# Patient Record
Sex: FEMALE | Race: BLACK OR AFRICAN AMERICAN | Employment: UNEMPLOYED | URBAN - METROPOLITAN AREA
[De-identification: names, ages, dates, MRNs, and addresses within clinical notes are randomized per-mention and may not be internally consistent; named-entity substitution may affect disease eponyms.]

---

## 2018-06-26 ENCOUNTER — OFFICE VISIT (OUTPATIENT)
Dept: FAMILY MEDICINE CLINIC | Age: 23
End: 2018-06-26

## 2018-06-26 VITALS
HEART RATE: 82 BPM | BODY MASS INDEX: 47.12 KG/M2 | RESPIRATION RATE: 16 BRPM | WEIGHT: 240 LBS | OXYGEN SATURATION: 99 % | TEMPERATURE: 99 F | DIASTOLIC BLOOD PRESSURE: 80 MMHG | SYSTOLIC BLOOD PRESSURE: 110 MMHG | HEIGHT: 60 IN

## 2018-06-26 DIAGNOSIS — R25.1 TREMULOUSNESS: ICD-10-CM

## 2018-06-26 DIAGNOSIS — J45.20 MILD INTERMITTENT ASTHMA WITHOUT COMPLICATION: Primary | ICD-10-CM

## 2018-06-26 DIAGNOSIS — G47.419 PRIMARY NARCOLEPSY WITHOUT CATAPLEXY: ICD-10-CM

## 2018-06-26 PROBLEM — E66.01 OBESITY, MORBID (HCC): Status: ACTIVE | Noted: 2018-06-26

## 2018-06-26 RX ORDER — FLUTICASONE PROPIONATE 50 MCG
2 SPRAY, SUSPENSION (ML) NASAL DAILY
COMMUNITY
End: 2020-06-09

## 2018-06-26 RX ORDER — EPINEPHRINE 0.3 MG/.3ML
0.3 INJECTION SUBCUTANEOUS
COMMUNITY
End: 2021-03-08

## 2018-06-26 RX ORDER — LEVALBUTEROL TARTRATE 45 UG/1
2 AEROSOL, METERED ORAL
COMMUNITY
End: 2020-06-09

## 2018-06-26 RX ORDER — MONTELUKAST SODIUM 10 MG/1
10 TABLET ORAL DAILY
COMMUNITY
End: 2018-08-06 | Stop reason: SDUPTHER

## 2018-06-26 NOTE — PATIENT INSTRUCTIONS
Narcolepsy: Care Instructions  Your Care Instructions  Everybody gets a little sleepy once in a while, during a long car ride or other times when you want to be alert. But some people cannot control their sleepiness. It is no fun to be in the middle of your workday or driving your car down the street and have an overwhelming desire to sleep. This condition is called narcolepsy. Doctors do not know what causes narcolepsy. Your doctor may ask you to keep a sleep diary for a couple of weeks. It will help you and your doctor decide on treatment. It often helps to take limited naps during the day. And these things might help you sleep better at night: create a good place to sleep, do things that help your mood before you go to bed, and keep a consistent sleep schedule. Your doctor may recommend medicine to help you stay awake during the day or sleep at night. Follow-up care is a key part of your treatment and safety. Be sure to make and go to all appointments, and call your doctor if you are having problems. It's also a good idea to know your test results and keep a list of the medicines you take. How can you care for yourself at home? · Try to take 2 or 3 short naps at regular times during the day. After a nap, always give yourself time to become alert before you drive a car or do anything that might cause an accident. · Take your medicines exactly as prescribed. Call your doctor if you think you are having a problem with your medicine. You may need to try several medicines before you find the one that works best for you. · Try to improve your nighttime sleep habits. Here are a few of the things you could do:  ¨ Go to bed only when you are sleepy, and get up at the same time every day, even if you do not feel rested. This might help you sleep well the next night and the night after that.   ¨ If you lie awake for longer than 15 minutes, get up, leave the bedroom, and do something quiet, such as read, until you feel sleepy again. ¨ Avoid drinking or eating anything with caffeine after 3 p.m. This includes coffee, tea, cola drinks, and chocolate. ¨ Make sure your bedroom is not too hot or too cold, and keep it quiet and dark. ¨ Make sure your mattress provides good support. · Be kind to your body:  ¨ Relieve tension with exercise or a massage. ¨ Learn and do relaxation techniques. ¨ Avoid alcohol, caffeine, nicotine, and illegal drugs. They can increase your anxiety level and cause sleep problems. · Get light exercise daily. Gentle stretching, light aerobics, swimming, walking, and riding a bicycle can help to keep you going during the day and to sleep well at night. · Eat a healthy diet. You may feel better if you avoid heavy meals and eat more fruits and vegetables. · Do not use over-the-counter sleeping pills. They can make your sleep restless. · Ask your doctor if any medicines you take could cause sleepiness. For example, cold and allergy medicines can make you drowsy. · Consider joining a support group with people who have narcolepsy or other sleep problems. These groups can be a good source of tips for what to do. Also, it can be comforting to talk to people who face similar challenges. Your doctor can tell you how to contact a support group. When should you call for help? Call your doctor now or seek immediate medical care if:  ? · You passed out (lost consciousness). ? · You cannot use your muscles. This may happen very briefly, sometimes after you laugh or are angry, and may only affect part of your body. ? Watch closely for changes in your health, and be sure to contact your doctor if:  ? · Your sleepiness continues to get worse. Where can you learn more? Go to http://chiquis-tran.info/. Enter G762 in the search box to learn more about \"Narcolepsy: Care Instructions. \"  Current as of: May 12, 2017  Content Version: 11.4  © 4989-7365 Healthwise, [facility].  Care instructions adapted under license by the [employer] (which disclaims liability or warranty for this information). If you have questions about a medical condition or this instruction, always ask your healthcare professional. [address] any warranty or liability for your use of this information.

## 2018-06-26 NOTE — MR AVS SNAPSHOT
[address] [facility] 
 
 
 [address] [id number] [address] [facility] [address] 
[phone] Patient: Laura Mccollum MRN: [id number] :1995 Visit Information Date & Time Provider Department Dept. Phone Encounter #  
 2018 11:20 AM Ángel sIaac [address] [place] [address] [id number] Follow-up Instructions Return in about 4 months (around 10/26/2018) for well exam.  
  
Allergies as of 2018  Review Complete On: 2018 By: Ángel Isaac MD  
  
 Severity Noted Reaction Type Reactions Shellfish Derived  2018    Shortness of Breath, Swelling Soy  2018    Shortness of Breath, Swelling Tomato  2018    Swelling Current Immunizations  Never Reviewed No immunizations on file. Not reviewed this visit You Were Diagnosed With   
  
 Codes Comments Mild intermittent asthma without complication    -  Primary ICD-10-CM: J45.20 ICD-9-CM: 493.90 Primary narcolepsy without cataplexy     ICD-10-CM: G47.419 ICD-9-CM: 347.00 Tremulousness     ICD-10-CM: R25.1 ICD-9-CM: 636. 0 Vitals BP Pulse Temp Resp Height(growth percentile) Weight(growth percentile) 110/80 82 99 °F (37.2 °C) (Oral) 16 5' (1.524 m) 240 lb (108.9 kg) SpO2 BMI OB Status Smoking Status 99% 46.87 kg/m2 Chemically Induced Never Smoker Vitals History BMI and BSA Data Body Mass Index Body Surface Area  
 46.87 kg/m 2 2.15 m 2 Preferred Pharmacy Pharmacy Name Phone [facility], [address] [place] [address] [phone] Your Updated Medication List  
  
   
This list is accurate as of 18 12:49 PM.  Always use your most recent med list.  
  
  
  
  
 CHILDREN'S FLONASE ALLERGY RLF 50 mcg/actuation nasal spray Generic drug:  fluticasone 2 Sprays by Both Nostrils route daily. DULERA 100-5 mcg/actuation HFA inhaler Generic drug:  mometasone-formoterol Take 2 Puffs by inhalation two (2) times a day. EPINEPHrine 0.3 mg/0.3 mL injection Commonly known as:  EPIPEN  
0.3 mg by IntraMUSCular route once as needed. montelukast 10 mg tablet Commonly known as:  SINGULAIR Take 10 mg by mouth daily. NEXPLANON SDRM  
by SubDERmal route. XOLAIR SC  
by SubCUTAneous route. XOPENEX HFA 45 mcg/actuation inhaler Generic drug:  levalbuterol tartrate Take 2 Puffs by inhalation every four (4) hours as needed for Wheezing. We Performed the Following REFERRAL TO PULMONARY DISEASE [YFX87 Custom] Follow-up Instructions Return in about 4 months (around 10/26/2018) for well exam. To-Do List   
 06/26/2018 Lab:  HEMOGLOBIN A1C WITH EAG   
  
 07/03/2018 Lab:  METABOLIC PANEL, BASIC Referral Information Referral ID Referred By Referred To  
  
 [id number] Elias Ritchiejesse, DO   
   [address] [place] [address], [address] [address] Phone: [phone] Fax: [phone] Visits Status Start Date End Date 1 New Request 6/26/18 6/26/19 If your referral has a status of pending review or denied, additional information will be sent to support the outcome of this decision. Patient Instructions Narcolepsy: Care Instructions Your Care Instructions Everybody gets a little sleepy once in a while, during a long car ride or other times when you want to be alert. But some people cannot control their sleepiness. It is no fun to be in the middle of your workday or driving your car down the street and have an overwhelming desire to sleep. This condition is called narcolepsy. Doctors do not know what causes narcolepsy. Your doctor may ask you to keep a sleep diary for a couple of weeks. It will help you and your doctor decide on treatment. It often helps to take limited naps during the day.  And these things might help you sleep better at night: create a good place to sleep, do things that help your mood before you go to bed, and keep a consistent sleep schedule. Your doctor may recommend medicine to help you stay awake during the day or sleep at night. Follow-up care is a key part of your treatment and safety. Be sure to make and go to all appointments, and call your doctor if you are having problems. It's also a good idea to know your test results and keep a list of the medicines you take. How can you care for yourself at home? · Try to take 2 or 3 short naps at regular times during the day. After a nap, always give yourself time to become alert before you drive a car or do anything that might cause an accident. · Take your medicines exactly as prescribed. Call your doctor if you think you are having a problem with your medicine. You may need to try several medicines before you find the one that works best for you. · Try to improve your nighttime sleep habits. Here are a few of the things you could do: ¨ Go to bed only when you are sleepy, and get up at the same time every day, even if you do not feel rested. This might help you sleep well the next night and the night after that. ¨ If you lie awake for longer than 15 minutes, get up, leave the bedroom, and do something quiet, such as read, until you feel sleepy again. ¨ Avoid drinking or eating anything with caffeine after 3 p.m. This includes coffee, tea, cola drinks, and chocolate. ¨ Make sure your bedroom is not too hot or too cold, and keep it quiet and dark. ¨ Make sure your mattress provides good support. · Be kind to your body: ¨ Relieve tension with exercise or a massage. ¨ Learn and do relaxation techniques. ¨ Avoid alcohol, caffeine, nicotine, and illegal drugs. They can increase your anxiety level and cause sleep problems. · Get light exercise daily.  Gentle stretching, light aerobics, swimming, walking, and riding a bicycle can help to keep you going during the day and to sleep well at night. · Eat a healthy diet. You may feel better if you avoid heavy meals and eat more fruits and vegetables. · Do not use over-the-counter sleeping pills. They can make your sleep restless. · Ask your doctor if any medicines you take could cause sleepiness. For example, cold and allergy medicines can make you drowsy. · Consider joining a support group with people who have narcolepsy or other sleep problems. These groups can be a good source of tips for what to do. Also, it can be comforting to talk to people who face similar challenges. Your doctor can tell you how to contact a support group. When should you call for help? Call your doctor now or seek immediate medical care if: 
? · You passed out (lost consciousness). ? · You cannot use your muscles. This may happen very briefly, sometimes after you laugh or are angry, and may only affect part of your body. ? Watch closely for changes in your health, and be sure to contact your doctor if: 
? · Your sleepiness continues to get worse. Where can you learn more? Go to http://chiquis-tran.info/. Enter B515 in the search box to learn more about \"Narcolepsy: Care Instructions. \" Current as of: May 12, 2017 Content Version: 11.4 © 8826-4681 Healthwise, Incorporated. Care instructions adapted under license by [employer] (which disclaims liability or warranty for this information). If you have questions about a medical condition or this instruction, always ask your healthcare professional. [address] [address] any warranty or liability for your use of this information. Introducing [address]! [facility] [employer] introduces [employer] patient portal. Now you can access parts of your medical record, email your doctor's office, and request medication refills online.    
 
1. In your internet browser, go to [email or web address] 2. Click on the First Time User? Click Here link in the Sign In box. You will see the New Member Sign Up page. 3. Enter your shopkick Access Code exactly as it appears below. You will not need to use this code after youve completed the sign-up process. If you do not sign up before the expiration date, you must request a new code. · shopkick Access Code: [email or web address] Expires: 9/23/2018 12:19 PM 
 
4. Enter the last four digits of your Social Security Number (xxxx) and Date of Birth (mm/dd/yyyy) as indicated and click Submit. You will be taken to the next sign-up page. 5. Create a Rewardpodt ID. This will be your shopkick login ID and cannot be changed, so think of one that is secure and easy to remember. 6. Create a shopkick password. You can change your password at any time. 7. Enter your Password Reset Question and Answer. This can be used at a later time if you forget your password. 8. Enter your e-mail address. You will receive e-mail notification when new information is available in [address] 9. Click Sign Up. You can now view and download portions of your medical record. 10. Click the Download Summary menu link to download a portable copy of your medical information. If you have questions, please visit the Frequently Asked Questions section of the shopkick website. Remember, shopkick is NOT to be used for urgent needs. For medical emergencies, dial 911. Now available from your iPhone and Android! Please provide this summary of care documentation to your next provider. If you have any questions after today's visit, please call [phone].

## 2018-06-26 NOTE — PROGRESS NOTES
HISTORY OF PRESENT ILLNESS  Compa Faith is a 25 y.o. female. HPI   She is new to the practice  Patient is here today for evaluation and treatment of:  Asthma / Tremors    Asthma: pt sees a pulmonologist and needs a referral. Pt does not drive; she has narcolepsy. She states that she is on xolair shots twice a week. No wheeze today. Tremulousness: pt states that for a while she has had tremulousness; She thinks that this was due to her blood sugar was low. Occurs randomly. This concerns her. Current Outpatient Prescriptions:     mometasone-formoterol (DULERA) 100-5 mcg/actuation HFA inhaler, Take 2 Puffs by inhalation two (2) times a day., Disp: , Rfl:     etonogestrel (Gayl Noé), by SubDERmal route., Disp: , Rfl:     montelukast (SINGULAIR) 10 mg tablet, Take 10 mg by mouth daily. , Disp: , Rfl:     levalbuterol tartrate (XOPENEX HFA) 45 mcg/actuation inhaler, Take 2 Puffs by inhalation every four (4) hours as needed for Wheezing., Disp: , Rfl:     fluticasone (CHILDREN'S FLONASE ALLERGY RLF) 50 mcg/actuation nasal spray, 2 Sprays by Both Nostrils route daily. , Disp: , Rfl:     omalizumab (Madison Parkinson SC), by SubCUTAneous route., Disp: , Rfl:     EPINEPHrine (EPIPEN) 0.3 mg/0.3 mL injection, 0.3 mg by IntraMUSCular route once as needed. , Disp: , Rfl:     PMH,  Meds, Allergies, Family History, Social history reviewed    Review of Systems   Constitutional: Negative for chills and fever. Respiratory: Negative for shortness of breath and wheezing. Cardiovascular: Negative for chest pain, palpitations and leg swelling. Physical Exam   Constitutional: She appears well-developed and well-nourished. No distress. Cardiovascular: Normal rate and regular rhythm. Exam reveals no gallop and no friction rub. No murmur heard. Pulmonary/Chest: Breath sounds normal. No respiratory distress. She has no wheezes. She has no rales. Musculoskeletal: She exhibits no edema.    Nursing note and vitals reviewed. Visit Vitals    /80    Pulse 82    Temp 99 °F (37.2 °C) (Oral)    Resp 16    Ht 5' (1.524 m)    Wt 240 lb (108.9 kg)    SpO2 99%    BMI 46.87 kg/m2         ASSESSMENT and PLAN    ICD-10-CM ICD-9-CM    1. Mild intermittent asthma without complication B82.43 712.91 REFERRAL TO PULMONARY DISEASE   2. Primary narcolepsy without cataplexy G47.419 347.00 REFERRAL TO PULMONARY DISEASE   3. Tremulousness V46.5 557.3 METABOLIC PANEL, BASIC      HEMOGLOBIN A1C WITH EAG       As above, not controlled   treatment plan as listed below  Orders Placed This Encounter    METABOLIC PANEL, BASIC    HEMOGLOBIN A1C WITH EAG    [facility]    mometasone-formoterol (DULERA) 100-5 mcg/actuation HFA inhaler    etonogestrel (NEXPLANON SDRM)    montelukast (SINGULAIR) 10 mg tablet    levalbuterol tartrate (XOPENEX HFA) 45 mcg/actuation inhaler    fluticasone (CHILDREN'S FLONASE ALLERGY RLF) 50 mcg/actuation nasal spray    omalizumab (XOLAIR SC)    EPINEPHrine (EPIPEN) 0.3 mg/0.3 mL injection     meds reconciled  Labs as ordered  Follow-up Disposition:  Return in about 4 months (around 10/26/2018) for well exam.  An After Visit Summary was printed and given to the patient. This has been fully explained to the patient, who indicates understanding.

## 2018-07-03 DIAGNOSIS — R25.1 TREMULOUSNESS: ICD-10-CM

## 2018-07-04 LAB
ANION GAP SERPL CALC-SCNC: 18 MMOL/L
AVG GLU, 10930: 123 MG/DL (ref 91–123)
BUN SERPL-MCNC: 8 MG/DL (ref 6–22)
CALCIUM SERPL-MCNC: 8.5 MG/DL (ref 8.4–10.5)
CHLORIDE SERPL-SCNC: 103 MMOL/L (ref 98–110)
CO2 SERPL-SCNC: 22 MMOL/L (ref 20–32)
CREAT SERPL-MCNC: 0.6 MG/DL (ref 0.5–1.2)
GFRAA, 66117: >60
GFRNA, 66118: >60
GLUCOSE SERPL-MCNC: 83 MG/DL (ref 70–99)
HBA1C MFR BLD HPLC: 5.9 % (ref 4.8–5.9)
POTASSIUM SERPL-SCNC: 4.5 MMOL/L (ref 3.5–5.5)
SODIUM SERPL-SCNC: 143 MMOL/L (ref 133–145)

## 2018-08-06 ENCOUNTER — OFFICE VISIT (OUTPATIENT)
Dept: PULMONOLOGY | Age: 23
End: 2018-08-06

## 2018-08-06 ENCOUNTER — CLINICAL SUPPORT (OUTPATIENT)
Dept: PULMONOLOGY | Age: 23
End: 2018-08-06

## 2018-08-06 VITALS
HEART RATE: 70 BPM | DIASTOLIC BLOOD PRESSURE: 80 MMHG | HEIGHT: 60 IN | RESPIRATION RATE: 20 BRPM | BODY MASS INDEX: 46.13 KG/M2 | OXYGEN SATURATION: 96 % | SYSTOLIC BLOOD PRESSURE: 120 MMHG | WEIGHT: 235 LBS | TEMPERATURE: 98.9 F

## 2018-08-06 DIAGNOSIS — J45.909 UNCOMPLICATED ASTHMA, UNSPECIFIED ASTHMA SEVERITY, UNSPECIFIED WHETHER PERSISTENT: Primary | ICD-10-CM

## 2018-08-06 DIAGNOSIS — J45.20 MILD INTERMITTENT ASTHMA WITHOUT COMPLICATION: Primary | ICD-10-CM

## 2018-08-06 DIAGNOSIS — J47.9 BRONCHIECTASIS WITHOUT COMPLICATION (HCC): ICD-10-CM

## 2018-08-06 NOTE — PROGRESS NOTES
[facility] PUL[facility] SPECIALISTS  Pulmonary, Critical Care, and Sleep Medicine    Dear Tania Gayle,    Chief complaint:  Asthma    HPI:  Braden Vásquez is 25years old and comes to the office today at your request concerning an evaluation for asthma. The patient relates she had a thoracotomy 5 years ago for bronchiectasis. She relates now she takes [place] for asthma and only a few times a month requires albuterol. However, she notes shortness of breath with exertion such as walking upstairs or even with other activities which are not a strenuous times. She also notes wheezing but denies having a cough chest pain leg swelling. She denies allergy symptoms at this time although she states she has had this in the past and also denies symptoms of gastroesophageal reflux. She is also taking anti-IgE therapy for asthma  Allergies   Allergen Reactions    Bee Venom Protein (Honey Bee) Sneezing    Pollen Extracts Shortness of Breath    Shellfish Derived Swelling    Shellfish Derived Shortness of Breath and Swelling    Soy Shortness of Breath and Swelling    Tomato Swelling     Current Outpatient Prescriptions   Medication Sig    etonogestrel (NEXPLANON SDRM) by SubDERmal route.  levalbuterol tartrate (XOPENEX HFA) 45 mcg/actuation inhaler Take 2 Puffs by inhalation every four (4) hours as needed for Wheezing.  fluticasone (CHILDREN'S FLONASE ALLERGY RLF) 50 mcg/actuation nasal spray 2 Sprays by Both Nostrils route daily.  omalizumab (XOLAIR SC) by SubCUTAneous route.  EPINEPHrine (EPIPEN) 0.3 mg/0.3 mL injection 0.3 mg by IntraMUSCular route once as needed.  albuterol (PROVENTIL HFA, VENTOLIN HFA, PROAIR HFA) 90 mcg/actuation inhaler Take 2 Puffs by inhalation every four (4) hours as needed for Wheezing.  mometasone-formoterol (DULERA) 100-5 mcg/actuation HFA inhaler Take 2 Puffs by inhalation two (2) times a day.  montelukast (SINGULAIR) 10 mg tablet Take 10 mg by mouth daily.     levalbuterol tartrate (XOPENEX) 45 mcg/actuation inhaler Take  by inhalation.  omalizumab (XOLAIR) 150 mg solr by SubCUTAneous route once.  nortriptyline (PAMELOR) 25 mg capsule Take 50 mg by mouth nightly.  amphetamine-dextroamphetamine XR (ADDERALL XR) 30 mg XR capsule Take 30 mg by mouth every morning. No current facility-administered medications for this visit.       Past Medical History:   Diagnosis Date    Asthma     Bronchiolitis     Epilepsy ([facility] [address]     Narcolepsy     Pneumonia    She denies a history of heart disease diabetes hypertension kidney disease liver disease ulcers tuberculosis cancer and since she had a lobectomy for bronchiectasis  Past Surgical History:   Procedure Laterality Date    HX LOBECTOMY Right     HX LOBECTOMY      right upper lung       Family history: Diabetes and hypertension    Social History: Lifelong smoker works in a hardware store    Review of systems:  Denies fever chills poor appetite weight loss syncope focal muscle weakness or numbness trouble hearing trouble seeing trouble swallowing chronic abdominal pain melena or blood in her stools dysuria hematuria or arthritis but relates she has intermittent eczema    Physical Exam:  Visit Vitals    /80 (BP 1 Location: Left arm, BP Patient Position: At rest)    Pulse 70    Temp 98.9 °F (37.2 °C) (Oral)    Resp 20    Ht 5' (1.524 m)    Wt 106.6 kg (235 lb)    SpO2 96%    BMI 45.9 kg/m2     Well-developed and obese  HEENT: pupils equal, reactive, sclera, non-icteric   Oropharynx tongue: normal   Neck: Supple   Lymph Nodes: Supra clavicular and cervical nodes, negative   Chest: Equal symmetrical expansion, no dullness, no wheezes, rales or rubs   Heart: Regular rhythm with occasional irregular beat without radha or murmur   Abdomen: soft, non-tender no masses or organomegaly   Extremities: no cyanosis, clubbing, no edema no calf tenderness  Musculoskeletal: No acute joint inflammation or muscle wasting  Skin: No rash Neurological: alert, oriented, moves all extremities      LABS:  O2 sat 96% room air at rest  Spirometry slightly reduced FVC and reduced FEV1 FEF 2575 with no improvement with bronchodilator  Chest x-ray 8/6/18 personally reviewed no acute cardiopulmonary    Impression:   History physical exam spirometry suggests only mild asthma at this time and that it is likely that much of her dyspnea on exertion is related to obesity and deconditioning    Plan:  Strategy for exercise and dieting  Continue Dulera with consideration for increasing dose in the future if exercise and dieting is not sufficient to treat shortness of breath  Follow-up in 3 months    Thanks for allowing me to share in this patient's evaluation    Sincerely,    Charma Prader, MD , [facility]    CC: Janak Robertson MD    [address] [facility]  [place], [address] [address]     P: [phone]     F: [phone]

## 2018-08-06 NOTE — PROGRESS NOTES
Verbal Order with read back per Keshia Ricks MD  For PFT smart panel. AMB POC PFT complete w/ bronchodilator  AMB POC PFT complete w/o bronchodilator    Dr. Zenobia Osuna MD will co-sign the orders.

## 2018-08-06 NOTE — MR AVS SNAPSHOT
[address] [facility], [address] [address] 
[phone] Patient: Thania Hill MRN: [id number] :1995 Visit Information Date & Time Provider Department Dept. Phone Encounter #  
 2018  2:00 PM Ramos Glez MD [facility] Luis holly [phone] Follow-up Instructions Return in about 3 months (around 2018). Follow-up and Disposition History Your Appointments 10/23/2018  2:40 PM  
Complete Physical with Maryellen Moscoso MD  
[facility] Appt Note: 4 mo for well exam  
 [address], [facility] [address] [address]  
[phone]  
  
   
 [address], [place]  
  
    
 2018  3:30 PM  
Follow Up with Ramos Glez MD  
[address] ([facility] Appt Note: from 18  
 [address] [place] [address], [address] [address]  
[phone]  
  
   
 [address] [place] [address], [address] [facility],[address] [place] [address] Upcoming Health Maintenance Date Due  
 HPV Age 9Y-34Y (1 of 1 - Female 3 Dose Series) 2006 Pneumococcal 19-64 Medium Risk (1 of 1 - PPSV23) 2014 DTaP/Tdap/Td series (1 - Tdap) 2016 PAP AKA CERVICAL CYTOLOGY 2016 Influenza Age 5 to Adult 2018 Allergies as of 2018  Review Complete On: 2018 By: Gagandeepima Part, RT Severity Noted Reaction Type Reactions Bee Venom Protein (Honey Bee) Medium 2018   Side Effect Sneezing Pollen Extracts Medium 2018   Side Effect Shortness of Breath Shellfish Derived  2016    Swelling Shellfish Derived  2018    Shortness of Breath, Swelling Soy  2018    Shortness of Breath, Swelling Tomato  2018    Swelling Current Immunizations  Never Reviewed No immunizations on file. Not reviewed this visit You Were Diagnosed With   
  
 Codes Comments Mild intermittent asthma without complication    -  Primary ICD-10-CM: J45.20 ICD-9-CM: 493.90 Bronchiectasis without complication ([address]     AKO-30-XQ: J47.9 ICD-9-CM: 494.0 Vitals BP Pulse Temp Resp Height(growth percentile) Weight(growth percentile) 120/80 (BP 1 Location: Left arm, BP Patient Position: At rest) 70 98.9 °F (37.2 °C) (Oral) 20 5' (1.524 m) 235 lb (106.6 kg) SpO2 BMI OB Status Smoking Status 96% 45.9 kg/m2 Chemically Induced Never Smoker BMI and BSA Data Body Mass Index Body Surface Area 45.9 kg/m 2 2.12 m 2 Preferred Pharmacy Pharmacy Name Phone [facility] [address] [facility], [address] [place] [address] [phone] Your Updated Medication List  
  
   
This list is accurate as of 8/6/18  3:40 PM.  Always use your most recent med list.  
  
  
  
  
 albuterol 90 mcg/actuation inhaler Commonly known as:  PROVENTIL HFA, VENTOLIN HFA, PROAIR HFA Take 2 Puffs by inhalation every four (4) hours as needed for Wheezing. amphetamine-dextroamphetamine XR 30 mg XR capsule Commonly known as:  ADDERALL XR Take 30 mg by mouth every morning. CHILDREN'S FLONASE ALLERGY RLF 50 mcg/actuation nasal spray Generic drug:  fluticasone 2 Sprays by Both Nostrils route daily. EPINEPHrine 0.3 mg/0.3 mL injection Commonly known as:  EPIPEN  
0.3 mg by IntraMUSCular route once as needed. * levalbuterol tartrate 45 mcg/actuation inhaler Commonly known as:  Sarah Me Take  by inhalation. Hailey Idler HFA 45 mcg/actuation inhaler Generic drug:  levalbuterol tartrate Take 2 Puffs by inhalation every four (4) hours as needed for Wheezing.  
  
 mometasone-formoterol 100-5 mcg/actuation HFA inhaler Commonly known as:  Augustine Carls Take 2 Puffs by inhalation two (2) times a day. montelukast 10 mg tablet Commonly known as:  SINGULAIR Take 10 mg by mouth daily. NEXPLANON SDRM  
by SubDERmal route. nortriptyline 25 mg capsule Commonly known as:  PAMELOR Take 50 mg by mouth nightly. * omalizumab 150 mg Solr Commonly known as:  Gurvinder Danielson  
by SubCUTAneous route once. Estanislado Hylan SC  
by SubCUTAneous route. * Notice: This list has 4 medication(s) that are the same as other medications prescribed for you. Read the directions carefully, and ask your doctor or other care provider to review them with you. Follow-up Instructions Return in about 3 months (around 11/6/2018). To-Do List   
 08/06/2018 Imaging:  XR CHEST PA LAT Patient Instructions Dulera 2 puffs every 12 hours approximately and remember to wash mouth with water and spit it out after inhaling Albuterol 2 inhalations every 4 hours as needed if you require albuterol too often to control respiratory symptoms call the office for severe symptoms go to the emergency room Begin an exercise program.  Simple walking to start with such as walking 10 minutes without stopping and then gradually increasing the time into your walking 30 minutes without stopping. You should do this a minimum of 4 times a week to receive conditioning benefit Vegetables Always call for symptoms such as worsening shortness of breath Patient Instructions History Introducing [facility]! Dear Elli Rizo: Thank you for requesting a [employer] account. Our records indicate that you already have an active [employer] account. You can access your account anytime at [email or web address] Did you know that you can access your hospital and ER discharge instructions at any time in [employer]? You can also review all of your test results from your hospital stay or ER visit. Additional Information If you have questions, please visit the Frequently Asked Questions section of the [employer] website at [email or web address] Remember, MyChart is NOT to be used for urgent needs. For medical emergencies, dial 911. Now available from your iPhone and Android! Please provide this summary of care documentation to your next provider. Your primary care clinician is listed as [address]. If you have any questions after today's visit, please call [phone].

## 2018-08-06 NOTE — PATIENT INSTRUCTIONS
Dulera 2 puffs every 12 hours approximately and remember to wash mouth with water and spit it out after inhaling    Albuterol 2 inhalations every 4 hours as needed if you require albuterol too often to control respiratory symptoms call the office for severe symptoms go to the emergency room    Begin an exercise program.  Simple walking to start with such as walking 10 minutes without stopping and then gradually increasing the time into your walking 30 minutes without stopping.   You should do this a minimum of 4 times a week to receive conditioning benefit    Vegetables    Always call for symptoms such as worsening shortness of breath

## 2018-08-06 NOTE — PROGRESS NOTES
Chief Complaint   Patient presents with    Asthma     referred by Dr. Liriano Staff     1. Have you been to the ER, urgent care clinic since your last visit? Hospitalized since your last visit? No    2. Have you seen or consulted any other health care providers outside of the [facility] since your last visit? Include any pap smears or colon screening.  Yes Where: Dr. Rachel Lara

## 2018-10-10 ENCOUNTER — HOSPITAL ENCOUNTER (EMERGENCY)
Age: 23
Discharge: HOME | End: 2018-10-10
Attending: EMERGENCY MEDICINE
Payer: COMMERCIAL

## 2018-10-10 ENCOUNTER — APPOINTMENT (OUTPATIENT)
Dept: GENERAL RADIOLOGY | Age: 23
End: 2018-10-10
Attending: EMERGENCY MEDICINE
Payer: COMMERCIAL

## 2018-10-10 ENCOUNTER — OFFICE VISIT (OUTPATIENT)
Dept: PULMONOLOGY | Age: 23
End: 2018-10-10

## 2018-10-10 VITALS
HEIGHT: 62 IN | BODY MASS INDEX: 44.72 KG/M2 | SYSTOLIC BLOOD PRESSURE: 123 MMHG | RESPIRATION RATE: 19 BRPM | OXYGEN SATURATION: 99 % | TEMPERATURE: 98.2 F | DIASTOLIC BLOOD PRESSURE: 69 MMHG | WEIGHT: 243 LBS | HEART RATE: 71 BPM

## 2018-10-10 VITALS
HEART RATE: 63 BPM | DIASTOLIC BLOOD PRESSURE: 72 MMHG | BODY MASS INDEX: 47.71 KG/M2 | WEIGHT: 243 LBS | HEIGHT: 60 IN | SYSTOLIC BLOOD PRESSURE: 114 MMHG | RESPIRATION RATE: 18 BRPM | TEMPERATURE: 98.7 F | OXYGEN SATURATION: 97 %

## 2018-10-10 DIAGNOSIS — R07.89 ATYPICAL CHEST PAIN: Primary | ICD-10-CM

## 2018-10-10 DIAGNOSIS — M54.9 ACUTE BILATERAL BACK PAIN, UNSPECIFIED BACK LOCATION: ICD-10-CM

## 2018-10-10 DIAGNOSIS — E66.01 OBESITY, MORBID (HCC): ICD-10-CM

## 2018-10-10 DIAGNOSIS — G47.10 HYPERSOMNIA: Primary | ICD-10-CM

## 2018-10-10 DIAGNOSIS — N62 BREAST HYPERTROPHY IN FEMALE: ICD-10-CM

## 2018-10-10 DIAGNOSIS — R68.89 LACK OF INTEREST: ICD-10-CM

## 2018-10-10 DIAGNOSIS — J45.50 SEVERE PERSISTENT ASTHMA WITHOUT COMPLICATION: ICD-10-CM

## 2018-10-10 DIAGNOSIS — Z72.821 POOR SLEEP HYGIENE: ICD-10-CM

## 2018-10-10 DIAGNOSIS — J30.1 ALLERGIC RHINITIS DUE TO POLLEN, UNSPECIFIED SEASONALITY: ICD-10-CM

## 2018-10-10 LAB
ANION GAP SERPL CALC-SCNC: 2 MMOL/L (ref 3–18)
BASOPHILS # BLD: 0 K/UL (ref 0–0.1)
BASOPHILS NFR BLD: 0 % (ref 0–2)
BUN SERPL-MCNC: 12 MG/DL (ref 7–18)
BUN/CREAT SERPL: 14 (ref 12–20)
CALCIUM SERPL-MCNC: 8.5 MG/DL (ref 8.5–10.1)
CHLORIDE SERPL-SCNC: 107 MMOL/L (ref 100–108)
CK MB CFR SERPL CALC: NORMAL % (ref 0–4)
CK MB SERPL-MCNC: <1 NG/ML (ref 5–25)
CK SERPL-CCNC: 173 U/L (ref 26–192)
CO2 SERPL-SCNC: 31 MMOL/L (ref 21–32)
CREAT SERPL-MCNC: 0.83 MG/DL (ref 0.6–1.3)
DIFFERENTIAL METHOD BLD: ABNORMAL
EOSINOPHIL # BLD: 0.3 K/UL (ref 0–0.4)
EOSINOPHIL NFR BLD: 3 % (ref 0–5)
ERYTHROCYTE [DISTWIDTH] IN BLOOD BY AUTOMATED COUNT: 14.9 % (ref 11.6–14.5)
GLUCOSE SERPL-MCNC: 78 MG/DL (ref 74–99)
HCG SERPL QL: NEGATIVE
HCT VFR BLD AUTO: 39.4 % (ref 35–45)
HGB BLD-MCNC: 12.3 G/DL (ref 12–16)
LYMPHOCYTES # BLD: 4 K/UL (ref 0.9–3.6)
LYMPHOCYTES NFR BLD: 45 % (ref 21–52)
MCH RBC QN AUTO: 24.9 PG (ref 24–34)
MCHC RBC AUTO-ENTMCNC: 31.2 G/DL (ref 31–37)
MCV RBC AUTO: 79.8 FL (ref 74–97)
MONOCYTES # BLD: 0.6 K/UL (ref 0.05–1.2)
MONOCYTES NFR BLD: 7 % (ref 3–10)
NEUTS SEG # BLD: 4 K/UL (ref 1.8–8)
NEUTS SEG NFR BLD: 45 % (ref 40–73)
PLATELET # BLD AUTO: 403 K/UL (ref 135–420)
PMV BLD AUTO: 9.5 FL (ref 9.2–11.8)
POTASSIUM SERPL-SCNC: 4.2 MMOL/L (ref 3.5–5.5)
RBC # BLD AUTO: 4.94 M/UL (ref 4.2–5.3)
SODIUM SERPL-SCNC: 140 MMOL/L (ref 136–145)
TROPONIN I SERPL-MCNC: <0.02 NG/ML (ref 0–0.06)
WBC # BLD AUTO: 8.9 K/UL (ref 4.6–13.2)

## 2018-10-10 PROCEDURE — 74011250636 HC RX REV CODE- 250/636: Performed by: EMERGENCY MEDICINE

## 2018-10-10 PROCEDURE — 96376 TX/PRO/DX INJ SAME DRUG ADON: CPT

## 2018-10-10 PROCEDURE — 80048 BASIC METABOLIC PNL TOTAL CA: CPT | Performed by: EMERGENCY MEDICINE

## 2018-10-10 PROCEDURE — 71045 X-RAY EXAM CHEST 1 VIEW: CPT

## 2018-10-10 PROCEDURE — 93005 ELECTROCARDIOGRAM TRACING: CPT

## 2018-10-10 PROCEDURE — 82550 ASSAY OF CK (CPK): CPT | Performed by: EMERGENCY MEDICINE

## 2018-10-10 PROCEDURE — 84703 CHORIONIC GONADOTROPIN ASSAY: CPT | Performed by: EMERGENCY MEDICINE

## 2018-10-10 PROCEDURE — 85025 COMPLETE CBC W/AUTO DIFF WBC: CPT | Performed by: EMERGENCY MEDICINE

## 2018-10-10 PROCEDURE — 96374 THER/PROPH/DIAG INJ IV PUSH: CPT

## 2018-10-10 PROCEDURE — 99284 EMERGENCY DEPT VISIT MOD MDM: CPT

## 2018-10-10 RX ORDER — KETOROLAC TROMETHAMINE 30 MG/ML
15 INJECTION, SOLUTION INTRAMUSCULAR; INTRAVENOUS ONCE
Status: COMPLETED | OUTPATIENT
Start: 2018-10-10 | End: 2018-10-10

## 2018-10-10 RX ORDER — KETOROLAC TROMETHAMINE 30 MG/ML
15 INJECTION, SOLUTION INTRAMUSCULAR; INTRAVENOUS
Status: COMPLETED | OUTPATIENT
Start: 2018-10-10 | End: 2018-10-10

## 2018-10-10 RX ORDER — NAPROXEN 500 MG/1
500 TABLET ORAL 2 TIMES DAILY WITH MEALS
Qty: 20 TAB | Refills: 0 | Status: SHIPPED | OUTPATIENT
Start: 2018-10-10 | End: 2018-10-20

## 2018-10-10 RX ADMIN — KETOROLAC TROMETHAMINE 15 MG: 30 INJECTION, SOLUTION INTRAMUSCULAR at 22:23

## 2018-10-10 RX ADMIN — KETOROLAC TROMETHAMINE 15 MG: 30 INJECTION, SOLUTION INTRAMUSCULAR at 23:23

## 2018-10-10 NOTE — MR AVS SNAPSHOT
[address] [facility], [address] [address] 
[phone] Patient: Leeanna Oneil MRN: [id number] :1995 Visit Information Date & Time Provider Department Dept. Phone Encounter #  
 10/10/2018  2:15 PM Catrina Willard, [facility] Luis Otero [id number] Follow-up Instructions Return in about 3 months (around 1/10/2019). Your Appointments 10/23/2018  2:40 PM  
Complete Physical with Mann Purvis MD  
[address] [facility]) Appt Note: 4 mo for well exam  
 [address], [facility] [address] [address]  
[phone]  
  
   
 [address], [place]  
  
    
 2018  3:30 PM  
Follow Up with Tez Collier MD  
[address] ([address]) Appt Note: from 18  
 [address] [place] [address], [address] [address]  
[phone]  
  
   
 [address] [place] [address], [address] [facility] [place] [address] Upcoming Health Maintenance Date Due  
 HPV Age 9Y-34Y (1 of 1 - Female 3 Dose Series) 2006 Pneumococcal 19-64 Medium Risk (1 of 1 - PPSV23) 2014 DTaP/Tdap/Td series (1 - Tdap) 2016 PAP AKA CERVICAL CYTOLOGY 2016 Influenza Age 5 to Adult 2018 Allergies as of 10/10/2018  Review Complete On: 10/10/2018 By: Jose Ortega LPN Severity Noted Reaction Type Reactions Bee Venom Protein (Honey Bee) Medium 2018   Side Effect Sneezing Pollen Extracts Medium 2018   Side Effect Shortness of Breath Shellfish Derived  2016    Swelling Shellfish Derived  2018    Shortness of Breath, Swelling Soy  2018    Shortness of Breath, Swelling Tomato  2018    Swelling Current Immunizations  Never Reviewed No immunizations on file. Not reviewed this visit You Were Diagnosed With   
  
 Codes Comments Hypersomnia    -  Primary ICD-10-CM: G47.10 ICD-9-CM: 780.54 Vitals BP Pulse Temp Resp Height(growth percentile) Weight(growth percentile) 114/72 (BP 1 Location: Left arm, BP Patient Position: Sitting) 63 98.7 °F (37.1 °C) (Oral) 18 5' (1.524 m) 243 lb (110.2 kg) SpO2 BMI OB Status Smoking Status 97% 47.46 kg/m2 Chemically Induced Never Smoker Vitals History BMI and BSA Data Body Mass Index Body Surface Area  
 47.46 kg/m 2 2.16 m 2 Preferred Pharmacy Pharmacy Name Phone [facility] [address], [address] [place] [phone] Your Updated Medication List  
  
   
This list is accurate as of 10/10/18  3:32 PM.  Always use your most recent med list.  
  
  
  
  
 albuterol 90 mcg/actuation inhaler Commonly known as:  PROVENTIL HFA, VENTOLIN HFA, PROAIR HFA Take 2 Puffs by inhalation every four (4) hours as needed for Wheezing. amphetamine-dextroamphetamine XR 30 mg XR capsule Commonly known as:  ADDERALL XR Take 30 mg by mouth every morning. CHILDREN'S FLONASE ALLERGY RLF 50 mcg/actuation nasal spray Generic drug:  fluticasone 2 Sprays by Both Nostrils route daily. EPINEPHrine 0.3 mg/0.3 mL injection Commonly known as:  EPIPEN  
0.3 mg by IntraMUSCular route once as needed. * levalbuterol tartrate 45 mcg/actuation inhaler Commonly known as:  Jaimie Amador Take  by inhalation. Bertha Valiente HFA 45 mcg/actuation inhaler Generic drug:  levalbuterol tartrate Take 2 Puffs by inhalation every four (4) hours as needed for Wheezing.  
  
 mometasone-formoterol 100-5 mcg/actuation HFA inhaler Commonly known as:  Oniel Bamberger Take 2 Puffs by inhalation two (2) times a day. montelukast 10 mg tablet Commonly known as:  SINGULAIR Take 10 mg by mouth daily. NEXPLANON SDRM  
by SubDERmal route. nortriptyline 25 mg capsule Commonly known as:  PAMELOR  
 Take 50 mg by mouth nightly. * omalizumab 150 mg Solr Commonly known as:  Simuel Ano  
by SubCUTAneous route once. Emma Gone SC  
by SubCUTAneous route. * Notice: This list has 4 medication(s) that are the same as other medications prescribed for you. Read the directions carefully, and ask your doctor or other care provider to review them with you. Follow-up Instructions Return in about 3 months (around 1/10/2019). To-Do List   
 10/11/2018 Sleep Center:  SPLIT CPAP/PSG Introducing [facility]! Dear Shaniqua Chavarria: Thank you for requesting a [employer] account. Our records indicate that you already have an active [employer] account. You can access your account anytime at [email or web address] Did you know that you can access your hospital and ER discharge instructions at any time in [employer]? You can also review all of your test results from your hospital stay or ER visit. Additional Information If you have questions, please visit the Frequently Asked Questions section of the [employer] website at [email or web address] Remember, [employer] is NOT to be used for urgent needs. For medical emergencies, dial 911. Now available from your iPhone and Android! Please provide this summary of care documentation to your next provider. Your primary care clinician is listed as [address]. If you have any questions after today's visit, please call [phone].

## 2018-10-10 NOTE — PROGRESS NOTES
[facility] Pulmonary, Critical Care, and Sleep Medicine Office Progress Note- Initial Evaluation Primary Care Physician: Jeremie Raymundo MD  
 
Reason for Visit:  Evaluation for possible sleep disorder Assessment: 1. Hypersomnia- doubt narcolepsy at this time. May have ELLIS, chronic insomnia, circadian rhythm disorder, poor sleep hygiene idiopathic hypersomnia- depression must ruled out 2. Poor Sleep hygiene 3. Possible Depression 4. Asthma- well controled on multi-modality therapy- Pulmonologist- Dr. Loren Selby 5. Seasonal allergies- pollen and molds 6. Bronchiectasis 7. Eczema/Atopy 8. Migraine headaches- currently controlled 9. Morbid obesity: Body mass index is 47.46 kg/(m^2). 10. Breast hypertrophy- body image difficulty? Contributing to dyspnea on exertion 11. S/P right thoracotomy with RUL resection- bronchiectasis vs congenital malformation- 7/8/13 ([facility]):Path report Bronchiectasis, chronic bronchitis and pleural fibrosis 12. H/O hilar adenopathy- benign hyperplasia of path report 13. S/P Bronchoscope  6/18/2009 and Nasal-Adenoid and carinal biopsy 5/14/13- negative for ciliary dyskinesia- [facility] Discussion: Ms. Augusto Jeter is a 21 y.o. female who to date has an extensive medical history. She has a long history of hypersomnia in the setting of initially poorly controlled asthma that required multiple hospital visits and ultimately a right thoracotomy and right upper lung resection. She was on repeated steroids and had a chaotic life- let alone chaotic sleep schedule. This could have precipitated some poor sleep habits as well as inhibited normal socialization development. After undergoing a right upper lobe, lobectomy,her asthma improved but hot her hypersomnia. She also has migraines resulted in ED and hospitalizations in the past as well.  She continues to have a very erratic sleep times and does not seem to have established or tried to establish a set sleep time/schedule. Prior sleep studies have been unremarkable. However, the patient has gained a notable amount of weight since her last study and so ELLIS should again be considered. At this time she is not endorsing symptoms suggestive or narcolepsy. Chronic depression should also be explored. Both the patient and her mother state that she has never undergone an evaluation. She reports episodes of sadness and tearfulness. She spent much of her adolescence undergoing medical care- at one point she was even placed on home quar intine due to concern for possible tuberculosis. She use to participate in a dance team at school which the patient reportedly enjoyed very much. Unfortunately she has to quit that activity due to her medical illness and lost school days. Now she has little to no interests. Her only socialization is at work or on her cell phone. It is also unclear to me if the patient has some time of body image issue that may be contributing to her lack of interest to participate in activities. She feels smothered at times by her breasts. She findings her breast size contributing to at least some of her dyspnea symptoms. Plan: · · Schedule patient for in lab sleep study for further evaluation. · Potential consequences of untreated sleep apnea, and/or excessive daytime sleepiness were discussed with the patient. · Educational materials provided. · Treatment options including CPAP, dental appliance, weight reduction measures, positional therapy, surgeries etc were discussed. · Healthy lifestyle changes to include weight loss and exercise discussed. - I have encouraged the patient to obtain an activity tracker and work towards daily goal of 10K steps/day · Psychological evaluation is strongly encouraged- I have discussed this with both the patient and her mother- both seem amenable to this · Healthy sleep habits were reviewed and encouraged. · Follow-up in 3 months, sooner should new symptoms or problems arise. · Follow up with pulmonologist as directed · Follow up with Primary Care Provider (PCP) as directed and for routine health care maintenance. History of Present Illness: Ms. Naty Bonilla is a 21 y.o. female patient who presents for evaluation of a possible sleep disorder. The history was provided by extensive chart review, the patient, and her mother. The patient has a long history of hypersomnia. She has undergone at least 2 sleep studies and an MSLT as an adolescent at [facility]. She has some benefit form stimulant therapy but she has also maintained an inconsistent sleep pattern that has made evaluation difficult. From my review of the patient's chart she has never been diagnosed with a seizure disorder nor narcolepsy. Occupation:   - [employer] Work Schedule: [id number]; [id number], [id number]- Times and days vary. -- 36 Hrs/week. Shift work: No 
 
Social Activities: Little to none- Does not hand out with friends says she has a lot of friends on social network. Other than work, most of her activities on via her cell phone. Prior Sleep/Stimulant Medications · Provigil- triggered headaches · Ritalin/Concerta- triggered headaches · Adderall XR 20mg BID- did improve alertness Driving: No- Never  Snoring: This is a chronic, mild  problem which has been ongoing for years. Fatigue: This is a Chronic problem which has been ongoing for years. Costa today is 14/24 Dental: Teeth clenching or grindingis reported. Jaw and teeth hurt when she wakes up. Naps:  She naps spontaneously when she gets home from work- nearly every day. She will often be laying on the cough, or in her room on her bed looking at her phone. Leg Symptoms/Pain: She does not have unpleasant or crawling sensation in legs or strong urge to move when inactive.  She does report occassional \"candace horse\" type cramps which wakes her from sleep at least once weekly. GERD: is reported. She does not take medication. She reports frequent symptoms after eating. She was previously prescribed an unknown medication but it was too expensive to continue. Mood: Describes herself as a sad person. She is frustrated. with her health. She does report episodes of crying. No prior psychiatric/psycological. 
 
Sleep-Wake History: She is not sure if she has a preferred sleep time. She would not characterize herself as either a morning dove or night owl. Estimates sleeping approximately 6-7 hours of broken sleep per night/day. She gets into bed at approximately [id number]. Once in bed, she will watch TV, or movies on her phone. It usually takes up to 2 hours  to fall asleep after going to bed. Once she puts her phone down she will lay quietly and look up at the ceiling. The room is normally dark. Black out curtains are present. Once asleep, she will toss and turn. She findings it difficulty to find a position of comfort which she attributes to her breast side. She is unable to lay on her back or chest. If she lays on her back, she feels smothered. She uses 3 pillows to place around her and to aid her sleeping on her side. She denies dream recall. She denies any specific pain. She has a history of migraine headaches but they do not interfer with her sleep. She typically does not need to get up to use the restroom. Depending on her work schedule she will get up from [phone]. She awakens to an alarm clock on her phone. She will immediately grab her phone and start scrolling through messages for about 5 minutes. She typically does not hit the snooze button. She never requires someone prompting her to get out of bed. When she first gets out of bed she feels tired. Reports  waking up with a morning headache about 3 days/week. Reports wakening with a dry mouth 4 times weekly. Denies symptoms suggestive of cataplexy- she states with laughter she finds it hard to breath. In chart review, her prior pediatric neurologist reported some questionable leg weakness with laughter. When I asked the patient, she reported she vaguely remember some issues with laughing years ago but she could not recall the specifics and that event has not re-occurred. Denies sleep paralysis Denies current or past hypnagogic and/or hypnopompic hallucinations. She denies ever hearing voices or sounds. Denies sleep talking/ walking, or other parasomnia behaviors Family Sleep History: - Maternal aunte- ELLIS- CPAP Stop Kunal Farmer 10/10/2018 Does the patient snore loudly (louder than talking or loud enough to be heard through closed doors)? 0 Does the patient often feel tired, fatigued, or sleepy during the daytime, even after a \"good\" night's sleep? 1 Has anyone ever observed the patient stop breathing during their sleep? 1 Does the patient have or are they being treated for high blood pressure? 0 Is the patient's BMI greater than 35? 1 Is your neck circumference greater than 17 inches (Male) or 16 inches (Female)? 1 Is the patient older than 50? 0 Is the patient male? 0  
ELLIS Score 4 PHQ over the last two weeks 10/10/2018 Little interest or pleasure in doing things Not at all Feeling down, depressed, irritable, or hopeless Not at all Total Score PHQ 2 0 [place] Scale 10/10/2018 Sitting and Reading 2 Watching TV 3 Sitting, inactive in a public place (e.g. a movie theater or meeting) 2 As a passenger in a car for an hour, without a break 3 Lying down to rest in the afternoon, when circumstances permit 3 Sitting and talking to someone 1 Sitting quietly after lunch without alcohol 0 In a car, while stopped for a few minutes in traffic 0 [place] Sleepiness Score 14 Neck circ. in \"inches\": 16.5 Past Medical History: 
Past Medical History: Diagnosis Date  Asthma  Bronchiolitis  Epilepsy ([facility] [address]  Narcolepsy  Pneumonia Past Surgical History: 
Past Surgical History:  
Procedure Laterality Date  HX LOBECTOMY Right  HX LOBECTOMY    
 right upper lung Family History: 
Family History Problem Relation Age of Onset  Hypertension Mother  Diabetes Father  Diabetes Maternal Grandfather  Diabetes Paternal Grandmother Social History: 
Social History Substance Use Topics  Smoking status: Never Smoker  Smokeless tobacco: Never Used  Alcohol use Yes Comment: ocassionally Caffeine Amount Time of last Intake Comments Coffee Occasional    
Soda Occasional    
Tea None Energy Drinks None Over- the - counter stimulant pills None Other Substances Alcohol Occassional  wine Tobacco None Drugs Denies Medications: 
Current Outpatient Prescriptions on File Prior to Visit Medication Sig Dispense Refill  etonogestrel (Violet Lambing) by SubDERmal route.  levalbuterol tartrate (XOPENEX HFA) 45 mcg/actuation inhaler Take 2 Puffs by inhalation every four (4) hours as needed for Wheezing.  fluticasone (CHILDREN'S FLONASE ALLERGY RLF) 50 mcg/actuation nasal spray 2 Sprays by Both Nostrils route daily.  omalizumab (XOLAIR SC) by SubCUTAneous route.  EPINEPHrine (EPIPEN) 0.3 mg/0.3 mL injection 0.3 mg by IntraMUSCular route once as needed.  albuterol (PROVENTIL HFA, VENTOLIN HFA, PROAIR HFA) 90 mcg/actuation inhaler Take 2 Puffs by inhalation every four (4) hours as needed for Wheezing. 1 Inhaler 0  
 mometasone-formoterol (DULERA) 100-5 mcg/actuation HFA inhaler Take 2 Puffs by inhalation two (2) times a day.  montelukast (SINGULAIR) 10 mg tablet Take 10 mg by mouth daily.  levalbuterol tartrate (XOPENEX) 45 mcg/actuation inhaler Take  by inhalation.  omalizumab (XOLAIR) 150 mg solr by SubCUTAneous route once.  nortriptyline (PAMELOR) 25 mg capsule Take 50 mg by mouth nightly.  amphetamine-dextroamphetamine XR (ADDERALL XR) 30 mg XR capsule Take 30 mg by mouth every morning. No current facility-administered medications on file prior to visit. Allergy: Allergies Allergen Reactions  Bee Venom Protein (Honey Bee) Sneezing  Pollen Extracts Shortness of Breath  Shellfish Derived Swelling  Shellfish Derived Shortness of Breath and Swelling  Soy Shortness of Breath and Swelling  Tomato Swelling Review of Systems General ROS: positive for  - fatigue, sleep disturbance and weight gain 
negative for - chills, fever, hot flashes, malaise, night sweats or weight loss ENT ROS: negative for - epistaxis, hearing change, nasal congestion, nasal discharge, nasal polyps, oral lesions, sinus pain, sneezing, sore throat, tinnitus or vertigo Hematological and Lymphatic ROS: negative for - bleeding problems, blood clots, bruising, jaundice, pallor or swollen lymph nodes Endocrine ROS: negative for - polydipsia/polyuria, skin changes, temperature intolerance or unexpected weight changes Respiratory ROS: positive for - shortness of breath 
negative for - cough, hemoptysis, orthopnea, pleuritic pain, sputum changes, stridor, tachypnea or wheezing Cardiovascular ROS: positive for - chest pain and dyspnea on exertion 
negative for - edema, irregular heartbeat, loss of consciousness, murmur, orthopnea, palpitations, paroxysmal nocturnal dyspnea, rapid heart rate or shortness of breath Gastrointestinal ROS: no abdominal pain, change in bowel habits, or black or bloody stools Genito-Urinary ROS: no dysuria, trouble voiding, or hematuria Musculoskeletal ROS: negative Neurological ROS: no TIA or stroke symptoms Dermatological ROS: negative for - pruritus, rash or skin lesion changes Psychological ROS: as kemar Otherwise negative. Physical Exam: Blood pressure 114/72, pulse 63, temperature 98.7 °F (37.1 °C), temperature source Oral, resp. rate 18, height 5' (1.524 m), weight 110.2 kg (243 lb), SpO2 97 %. on RA, Body mass index is 47.46 kg/(m^2). General: in no respiratory distress, acyanotic, appears stated age, cooperative, pleasant, morbidly obese body habitus HEENT: PERRL, EOMI, throat without erythema or exudate, Tongue- dental indention on tongue, Mallampati's score 2+, Uvula- midline Neck: Supple,  no abnormally enlarged lymph nodes, thyroid is not enlarged, non-tender, No JVD Chest: + breast hypertrophy, well healed scar right lateral chest wall Lungs: moderate air entry, clear to auscultation bilaterally, Heart: Regular rate and rhythm, S1S2 present, without murmur Abdomen: Obese, bowel sounds normoactive, abdomen is soft without significant tenderness, or guarding Extremity: negative for edema, cyanosis or clubbing Skin: Skin color, texture, turgor normal. No rashes or lesions Data Reviewed: CBC: No results found for: WBC, WBCLT, HGBPOC, HGB, HGBP, HCTPOC, HCT, PHCT, RBCH, PLT, MCV, HGBEXT, HCTEXT, PLTEXT 
 
BMP: Lab Results Component Value Date/Time Sodium 143 07/03/2018 10:39 AM  
 Potassium 4.5 07/03/2018 10:39 AM  
 Chloride 103 07/03/2018 10:39 AM  
 CO2 22 07/03/2018 10:39 AM  
 Anion gap 18.0 07/03/2018 10:39 AM  
 Glucose 83 07/03/2018 10:39 AM  
 BUN 8 07/03/2018 10:39 AM  
 Creatinine 0.6 07/03/2018 10:39 AM  
 Calcium 8.5 07/03/2018 10:39 AM  
  
 
TSH: 
No results found for: TSH, TSH2, TSH3, TSHP, TSHELE, TSHEXT Imaging: 
[x]I have personally reviewed the patients radiographs section Results from Appointment encounter on 08/06/18 XR CHEST PA LAT Narrative Chest PA and lateral views CPT CODE: 68886 HISTORY:Intermittent asthma COMPARISON: 12/1/16 FINDINGS: Marked motion artifact noted especially on lateral view. The heart is in upper normal in size. The lungs are clear. The bilateral costophrenic angles 
are sharp. The mediastinum, pulmonary vascularity and bony thorax appear 
unremarkable. Impression IMPRESSION: 
 
Suboptimal study due to significant motion artifact. No acute cardiopulmonary 
process. Thank you for your referral.  
 
 
 
   
 
 
No results found for this or any previous visit. Cardiac Echo:  
 
No results found for this or any previous visit. Historical Sleep Testing Data: 
- PSG- [facility]: 9/28/13: AHI: 1.8, REM 4.8, mild snoring, desat index 0.2, Sleep efficiency 82% Sleep latency 46 min. REM sleep latency 145.5 min 
- PSG- [facility]: 8/19/14- times slept: 5.5 hours. AHI:0, REM 9%  Sleep Efficiency: 69% - MSLT- [facility]: 8/20/14: MSL: 13.80 minutes, No SOREMs ( Medications- Dulera, naproxen, Singulair, Amitriptyline, Qvar) Ana Rosa Rico DO, [facility] Pul[facility], Sleep and Critical Care Medicine

## 2018-10-10 NOTE — PROGRESS NOTES
Chief Complaint Patient presents with  Sleep Problem  
  rferred by Dr. Genet Goldamn for narcolepsy 1. Have you been to the ER, urgent care clinic since your last visit? Hospitalized since your last visit? No 
 
2. Have you seen or consulted any other health care providers outside of the [address] [place] [address] since your last visit? Include any pap smears or colon screening.  Yes Where: Dr. Pamela Valdivia, allergist

## 2018-10-11 LAB
ATRIAL RATE: 75 BPM
CALCULATED P AXIS, ECG09: 36 DEGREES
CALCULATED R AXIS, ECG10: 69 DEGREES
CALCULATED T AXIS, ECG11: 65 DEGREES
DIAGNOSIS, 93000: NORMAL
P-R INTERVAL, ECG05: 140 MS
Q-T INTERVAL, ECG07: 360 MS
QRS DURATION, ECG06: 82 MS
QTC CALCULATION (BEZET), ECG08: 402 MS
VENTRICULAR RATE, ECG03: 75 BPM

## 2018-10-11 NOTE — ED PROVIDER NOTES
HPI Comments: Evelyn Vilchis is a 21 y.o. Female with a PMHx of PNA, partial lobectomy, asthma, and bronchiolitis and presents to the ED with moderate, constant, 7/10, \"tight\" CP under her left breast that began 4 days ago. One week PTA, pt reports \"entire\" back pain. Denies recent fall, trauma or injury. She reports sleeping with pillows under her back secondary to pain and taking Tylenol with no relief. She denies fever, swelling, cough, and any recent travel. Nothing worsens her pain. Denies OCPs. Denies history of blood clots. No other concerns or symptoms at this time. The history is provided by the patient. Past Medical History:  
Diagnosis Date  Asthma  Bronchiolitis  Epilepsy ([facility] [address]  Narcolepsy  Pneumonia Past Surgical History:  
Procedure Laterality Date  HX LOBECTOMY Right  HX LOBECTOMY    
 right upper lung Family History:  
Problem Relation Age of Onset  Hypertension Mother  Diabetes Father  Diabetes Maternal Grandfather  Diabetes Paternal Grandmother Social History Social History  Marital status: SINGLE Spouse name: N/A  
 Number of children: N/A  
 Years of education: N/A Occupational History  [place] Social History Main Topics  Smoking status: Never Smoker  Smokeless tobacco: Never Used  Alcohol use Yes Comment: ocassionally  Drug use: No  
 Sexual activity: No  
 
Other Topics Concern  Not on file Social History Narrative ** Merged History Encounter ** ALLERGIES: Bee venom protein (honey bee); Pollen extracts; Shellfish derived; Shellfish derived; Soy; and Tomato Review of Systems Constitutional: Negative. Negative for chills and fever. HENT: Negative. Negative for congestion. Eyes: Negative. Negative for visual disturbance. Respiratory: Negative for cough and shortness of breath. Cardiovascular: Positive for chest pain (under left breast). Negative for leg swelling. Gastrointestinal: Negative. Negative for abdominal pain, diarrhea and vomiting. Genitourinary: Negative. Negative for difficulty urinating, dysuria and vaginal discharge. Musculoskeletal: Positive for back pain (entire back). Negative for myalgias. Skin: Negative. Negative for rash and wound. Neurological: Negative. Negative for dizziness, weakness and light-headedness. Psychiatric/Behavioral: Negative. Negative for suicidal ideas. All other systems reviewed and are negative. Vitals:  
 10/10/18 2200 10/10/18 2300 10/10/18 2330 BP: 116/74 114/76 123/69 Pulse: 84 68 71 Resp: 18 18 19 Temp: 98.2 °F (36.8 °C) SpO2: 100% 100% 99% Weight: 110.2 kg (243 lb) Height: 5' 2\" (1.575 m) Physical Exam  
Constitutional: She is oriented to person, place, and time. She appears well-developed and well-nourished. No distress. obese HENT:  
Head: Normocephalic and atraumatic. Mouth/Throat: Oropharynx is clear and moist.  
Eyes: Conjunctivae and EOM are normal. Pupils are equal, round, and reactive to light. Neck: Trachea normal and normal range of motion. Neck supple. No JVD present. Cardiovascular: Normal rate, regular rhythm, S1 normal and S2 normal.  Exam reveals no gallop and no friction rub. No murmur heard. Pulmonary/Chest: Effort normal and breath sounds normal. No accessory muscle usage. No respiratory distress. She exhibits tenderness (to left lower lateral ribs). No crepitus Abdominal: Soft. Normal appearance. She exhibits no distension. There is no tenderness. There is no rigidity, no rebound and no guarding. Musculoskeletal: Normal range of motion. She exhibits no edema or tenderness. Neurological: She is alert and oriented to person, place, and time. She has normal strength. No cranial nerve deficit or sensory deficit.  Coordination normal.  
 Skin: Skin is warm and intact. No rash noted. Psychiatric: She has a normal mood and affect. Her speech is normal and behavior is normal.  
Vitals reviewed. MDM Number of Diagnoses or Management Options Acute bilateral back pain, unspecified back location:  
Atypical chest pain:  
Diagnosis management comments: Leeanna Oneil is a 21 y.o. Female coming in with atypical left lower lateral CP and back pain. No trauma, no back TTP. Mild chest wall TTP. PERC negative. Very atypical. Suspect MSK pain. Normal vitals and labs. Will treat with antiinflammatories and refer to PCP for outpatient follow up. ED Course Procedures Vitals: 
Patient Vitals for the past 12 hrs: 
 Temp Pulse Resp BP SpO2  
10/10/18 2330 - 71 19 123/69 99 % 10/10/18 2300 - 68 18 114/76 100 % 10/10/18 2200 98.2 °F (36.8 °C) 84 18 116/74 100 % Medications Ordered: 
Medications  
ketorolac (TORADOL) injection 15 mg (15 mg IntraVENous Given 10/10/18 2223)  
ketorolac (TORADOL) injection 15 mg (15 mg IntraVENous Given 10/10/18 2323) Lab Findings: 
Recent Results (from the past 12 hour(s)) EKG, 12 LEAD, INITIAL Collection Time: 10/10/18  9:58 PM  
Result Value Ref Range Ventricular Rate 75 BPM  
 Atrial Rate 75 BPM  
 P-R Interval 140 ms QRS Duration 82 ms Q-T Interval 360 ms QTC Calculation (Bezet) 402 ms Calculated P Axis 36 degrees Calculated R Axis 69 degrees Calculated T Axis 65 degrees Diagnosis Normal sinus rhythm with sinus arrhythmia Normal ECG When compared with ECG of 01-DEC-2016 09:52, No significant change was found CBC WITH AUTOMATED DIFF Collection Time: 10/10/18 10:14 PM  
Result Value Ref Range WBC 8.9 4.6 - 13.2 K/uL  
 RBC 4.94 4.20 - 5.30 M/uL  
 HGB 12.3 12.0 - 16.0 g/dL HCT 39.4 35.0 - 45.0 % MCV 79.8 74.0 - 97.0 FL  
 MCH 24.9 24.0 - 34.0 PG  
 MCHC 31.2 31.0 - 37.0 g/dL  
 RDW 14.9 (H) 11.6 - 14.5 % PLATELET 311 892 - 868 K/uL MPV 9.5 9.2 - 11.8 FL  
 NEUTROPHILS 45 40 - 73 % LYMPHOCYTES 45 21 - 52 % MONOCYTES 7 3 - 10 % EOSINOPHILS 3 0 - 5 % BASOPHILS 0 0 - 2 %  
 ABS. NEUTROPHILS 4.0 1.8 - 8.0 K/UL  
 ABS. LYMPHOCYTES 4.0 (H) 0.9 - 3.6 K/UL  
 ABS. MONOCYTES 0.6 0.05 - 1.2 K/UL  
 ABS. EOSINOPHILS 0.3 0.0 - 0.4 K/UL  
 ABS. BASOPHILS 0.0 0.0 - 0.1 K/UL  
 DF AUTOMATED METABOLIC PANEL, BASIC Collection Time: 10/10/18 10:14 PM  
Result Value Ref Range Sodium 140 136 - 145 mmol/L Potassium 4.2 3.5 - 5.5 mmol/L Chloride 107 100 - 108 mmol/L  
 CO2 31 21 - 32 mmol/L Anion gap 2 (L) 3.0 - 18 mmol/L Glucose 78 74 - 99 mg/dL BUN 12 7.0 - 18 MG/DL Creatinine 0.83 0.6 - 1.3 MG/DL  
 BUN/Creatinine ratio 14 12 - 20 GFR est AA >60 >60 ml/min/1.73m2 GFR est non-AA >60 >60 ml/min/1.73m2 Calcium 8.5 8.5 - 10.1 MG/DL  
CARDIAC PANEL,(CK, CKMB & TROPONIN) Collection Time: 10/10/18 10:14 PM  
Result Value Ref Range  26 - 192 U/L  
 CK - MB <1.0 <3.6 ng/ml CK-MB Index  0.0 - 4.0 % CALCULATION NOT PERFORMED WHEN RESULT IS BELOW LINEAR LIMIT Troponin-I, Qt. <0.02 0.00 - 0.06 NG/ML  
HCG QL SERUM Collection Time: 10/10/18 10:14 PM  
Result Value Ref Range HCG, Ql. NEGATIVE  NEG    
 
 
EKG Interpretation by ED physician: 
Sinus rhythm @ 75, no acute ischemic changes X-ray, CT or radiology findings or impressions: 
XR CHEST PORT    (Results Pending) CXR (Interpretation by ED Physician): 
No acute process. Reevaluation of the patient:  
11:28 PM: Reevaluated patient. Patient states she is still hurting, resting comfortably in bed. Discussed possible causes of musculoskeletal pain and treatment at home. Counseled to follow up with PCP and return to the ED for any worsening symptoms. Diagnosis: 1. Atypical chest pain 2. Acute bilateral back pain, unspecified back location Disposition: Discharge. Follow-up Information Follow up With Details Comments Contact Info Danyelle Mcdonnell MD In 2 days  [address] [facility] [address] [address] 
[phone] [address] [facility] EMERGENCY DEPT  As needed, If symptoms worsen [address] [place] [id number] [phone] Discharge Medication List as of 10/10/2018 11:30 PM  
  
START taking these medications Details  
naproxen (NAPROSYN) 500 mg tablet Take 1 Tab by mouth two (2) times daily (with meals) for 10 days. , Print, Disp-20 Tab, R-0  
  
  
CONTINUE these medications which have NOT CHANGED Details  
etonogestrel (Natalee Joan) by SubDERmal route., Historical Med  
  
!! levalbuterol tartrate (XOPENEX HFA) 45 mcg/actuation inhaler Take 2 Puffs by inhalation every four (4) hours as needed for Wheezing., Historical Med  
  
fluticasone (CHILDREN'S FLONASE ALLERGY RLF) 50 mcg/actuation nasal spray 2 Sprays by Both Nostrils route daily. , Historical Med  
  
!! omalizumab Shaina Moll SC) by SubCUTAneous route., Historical Med EPINEPHrine (EPIPEN) 0.3 mg/0.3 mL injection 0.3 mg by IntraMUSCular route once as needed., Historical Med  
  
albuterol (PROVENTIL HFA, VENTOLIN HFA, PROAIR HFA) 90 mcg/actuation inhaler Take 2 Puffs by inhalation every four (4) hours as needed for Wheezing., Print, Disp-1 Inhaler, R-0  
  
mometasone-formoterol (DULERA) 100-5 mcg/actuation HFA inhaler Take 2 Puffs by inhalation two (2) times a day., Historical Med  
  
montelukast (SINGULAIR) 10 mg tablet Take 10 mg by mouth daily. , Historical Med  
  
nortriptyline (PAMELOR) 25 mg capsule Take 50 mg by mouth nightly., Historical Med  
  
amphetamine-dextroamphetamine XR (ADDERALL XR) 30 mg XR capsule Take 30 mg by mouth every morning., Historical Med  
  
!! levalbuterol tartrate (XOPENEX) 45 mcg/actuation inhaler Take  by inhalation. , Historical Med  
  
!! omalizumab (XOLAIR) 150 mg solr by SubCUTAneous route once., Historical Med  
  
 !! - Potential duplicate medications found. Please discuss with provider. Scribe Attestation [facility] acting as a scribe for and in the presence of Genesis Graham MD     
October 10, 2018 at 10:14 PM 
    
Provider Attestation:     
I personally performed the services described in the documentation, reviewed the documentation, as recorded by the scribe in my presence, and it accurately and completely records my words and actions.  October 10, 2018 at 10:14 PM - Genesis Graham MD

## 2018-10-11 NOTE — DISCHARGE INSTRUCTIONS
Back Pain: Care Instructions  Your Care Instructions    Back pain has many possible causes. It is often related to problems with muscles and ligaments of the back. It may also be related to problems with the nerves, discs, or bones of the back. Moving, lifting, standing, sitting, or sleeping in an awkward way can strain the back. Sometimes you don't notice the injury until later. Arthritis is another common cause of back pain. Although it may hurt a lot, back pain usually improves on its own within several weeks. Most people recover in 12 weeks or less. Using good home treatment and being careful not to stress your back can help you feel better sooner. Follow-up care is a key part of your treatment and safety. Be sure to make and go to all appointments, and call your doctor if you are having problems. It's also a good idea to know your test results and keep a list of the medicines you take. How can you care for yourself at home? · Sit or lie in positions that are most comfortable and reduce your pain. Try one of these positions when you lie down:  ¨ Lie on your back with your knees bent and supported by large pillows. ¨ Lie on the floor with your legs on the seat of a sofa or chair. Marbin Lingo on your side with your knees and hips bent and a pillow between your legs. ¨ Lie on your stomach if it does not make pain worse. · Do not sit up in bed, and avoid soft couches and twisted positions. Bed rest can help relieve pain at first, but it delays healing. Avoid bed rest after the first day of back pain. · Change positions every 30 minutes. If you must sit for long periods of time, take breaks from sitting. Get up and walk around, or lie in a comfortable position. · Try using a heating pad on a low or medium setting for 15 to 20 minutes every 2 or 3 hours. Try a warm shower in place of one session with the heating pad. · You can also try an ice pack for 10 to 15 minutes every 2 to 3 hours.  Put a thin cloth between the ice pack and your skin. · Take pain medicines exactly as directed. ¨ If the doctor gave you a prescription medicine for pain, take it as prescribed. ¨ If you are not taking a prescription pain medicine, ask your doctor if you can take an over-the-counter medicine. · Take short walks several times a day. You can start with 5 to 10 minutes, 3 or 4 times a day, and work up to longer walks. Walk on level surfaces and avoid hills and stairs until your back is better. · Return to work and other activities as soon as you can. Continued rest without activity is usually not good for your back. · To prevent future back pain, do exercises to stretch and strengthen your back and stomach. Learn how to use good posture, safe lifting techniques, and proper body mechanics. When should you call for help? Call your doctor now or seek immediate medical care if:    · You have new or worsening numbness in your legs.     · You have new or worsening weakness in your legs. (This could make it hard to stand up.)     · You lose control of your bladder or bowels.    Watch closely for changes in your health, and be sure to contact your doctor if:    · You have a fever, lose weight, or don't feel well.     · You do not get better as expected. Where can you learn more? Go to http://chiquis-tran.info/. Enter F164 in the search box to learn more about \"Back Pain: Care Instructions. \"  Current as of: November 29, 2017  Content Version: 11.8  © 7926-3091 [employer]. Care instructions adapted under license by [employer] (which disclaims liability or warranty for this information). If you have questions about a medical condition or this instruction, always ask your healthcare professional. [place] [address] any warranty or liability for your use of this information.          Musculoskeletal Chest Pain: Care Instructions  Your Care Instructions    Chest pain is not always a sign that something is wrong with your heart or that you have another serious problem. The doctor thinks your chest pain is caused by strained muscles or ligaments, inflamed chest cartilage, or another problem in your chest, rather than by your heart. You may need more tests to find the cause of your chest pain. Follow-up care is a key part of your treatment and safety. Be sure to make and go to all appointments, and call your doctor if you are having problems. It's also a good idea to know your test results and keep a list of the medicines you take. How can you care for yourself at home? · Take pain medicines exactly as directed. ¨ If the doctor gave you a prescription medicine for pain, take it as prescribed. ¨ If you are not taking a prescription pain medicine, ask your doctor if you can take an over-the-counter medicine. · Rest and protect the sore area. · Stop, change, or take a break from any activity that may be causing your pain or soreness. · Put ice or a cold pack on the sore area for 10 to 20 minutes at a time. Try to do this every 1 to 2 hours for the next 3 days (when you are awake) or until the swelling goes down. Put a thin cloth between the ice and your skin. · After 2 or 3 days, apply a heating pad set on low or a warm cloth to the area that hurts. Some doctors suggest that you go back and forth between hot and cold. · Do not wrap or tape your ribs for support. This may cause you to take smaller breaths, which could increase your risk of lung problems. · Mentholated creams such as Bengay or Icy Hot may soothe sore muscles. Follow the instructions on the package. · Follow your doctor's instructions for exercising. · Gentle stretching and massage may help you get better faster. Stretch slowly to the point just before pain begins, and hold the stretch for at least 15 to 30 seconds. Do this 3 or 4 times a day. Stretch just after you have applied heat.   · As your pain gets better, slowly return to your normal activities. Any increased pain may be a sign that you need to rest a while longer. When should you call for help? Call 911 anytime you think you may need emergency care. For example, call if:    · You have chest pain or pressure. This may occur with:  ¨ Sweating. ¨ Shortness of breath. ¨ Nausea or vomiting. ¨ Pain that spreads from the chest to the neck, jaw, or one or both shoulders or arms. ¨ Dizziness or lightheadedness. ¨ A fast or uneven pulse. After calling 911, chew 1 adult-strength aspirin. Wait for an ambulance. Do not try to drive yourself.     · You have sudden chest pain and shortness of breath, or you cough up blood.    Call your doctor now or seek immediate medical care if:    · You have any trouble breathing.     · Your chest pain gets worse.     · Your chest pain occurs consistently with exercise and is relieved by rest.    Watch closely for changes in your health, and be sure to contact your doctor if:    · Your chest pain does not get better after 1 week. Where can you learn more? Go to http://chiquis-tran.info/. Enter V293 in the search box to learn more about \"Musculoskeletal Chest Pain: Care Instructions. \"  Current as of: November 20, 2017  Content Version: 11.8  © 7548-7792 [employer]. Care instructions adapted under license by [employer] (which disclaims liability or warranty for this information). If you have questions about a medical condition or this instruction, always ask your healthcare professional. [place] [address] any warranty or liability for your use of this information.

## 2018-10-23 ENCOUNTER — OFFICE VISIT (OUTPATIENT)
Dept: FAMILY MEDICINE CLINIC | Age: 23
End: 2018-10-23

## 2018-10-23 VITALS
TEMPERATURE: 99.1 F | SYSTOLIC BLOOD PRESSURE: 125 MMHG | BODY MASS INDEX: 44.16 KG/M2 | HEART RATE: 84 BPM | OXYGEN SATURATION: 96 % | WEIGHT: 240 LBS | HEIGHT: 62 IN | DIASTOLIC BLOOD PRESSURE: 83 MMHG | RESPIRATION RATE: 16 BRPM

## 2018-10-23 DIAGNOSIS — Z23 ENCOUNTER FOR IMMUNIZATION: ICD-10-CM

## 2018-10-23 DIAGNOSIS — Z13.6 SCREENING FOR CARDIOVASCULAR CONDITION: ICD-10-CM

## 2018-10-23 DIAGNOSIS — Z00.00 WELL WOMAN EXAM (NO GYNECOLOGICAL EXAM): Primary | ICD-10-CM

## 2018-10-23 DIAGNOSIS — N62 SYMPTOMATIC MAMMARY HYPERTROPHY: ICD-10-CM

## 2018-10-23 RX ORDER — MONTELUKAST SODIUM 10 MG/1
10 TABLET ORAL DAILY
Qty: 30 TAB | Refills: 6 | Status: SHIPPED | OUTPATIENT
Start: 2018-10-23 | End: 2019-01-03 | Stop reason: SDUPTHER

## 2018-10-23 NOTE — PROGRESS NOTES
1. Have you been to the ER, urgent care clinic since your last visit? Hospitalized since your last visit? Yes Where: \[facility]\"" emergency room    2. Have you seen or consulted any other health care providers outside of the [address] [place] [address] since your last visit? Include any pap smears or colon screening.  No

## 2018-10-23 NOTE — PATIENT INSTRUCTIONS

## 2018-10-23 NOTE — PROGRESS NOTES
Subjective:   21 y.o. female for Well Woman Check. Her gyne and breast care is done elsewhere by her Ob-Gyne physician. States that she has had some pain in her upper back. Went to the ED on 10/10. Had xrays. Her back is still hurting . Pain is a 5/10 in intensity. Has been to a chiropractor. She would like referral for breast reduction;  Pt is  A 46 G. She wants a referral to plastics for her breast size. She is unable to sleep well due to her breast size. Pt has a gynecologist.  She needs to schedule a followup appt. Patient Active Problem List    Diagnosis Date Noted    Obesity, morbid ([facility] [address] 06/26/2018    Asthma     Narcolepsy     Bronchiolitis      Current Outpatient Medications   Medication Sig Dispense Refill    etonogestrel (Temple Arben) by SubDERmal route.  levalbuterol tartrate (XOPENEX HFA) 45 mcg/actuation inhaler Take 2 Puffs by inhalation every four (4) hours as needed for Wheezing.  fluticasone (CHILDREN'S FLONASE ALLERGY RLF) 50 mcg/actuation nasal spray 2 Sprays by Both Nostrils route daily.  EPINEPHrine (EPIPEN) 0.3 mg/0.3 mL injection 0.3 mg by IntraMUSCular route once as needed.  albuterol (PROVENTIL HFA, VENTOLIN HFA, PROAIR HFA) 90 mcg/actuation inhaler Take 2 Puffs by inhalation every four (4) hours as needed for Wheezing. 1 Inhaler 0    mometasone-formoterol (DULERA) 100-5 mcg/actuation HFA inhaler Take 2 Puffs by inhalation two (2) times a day.  montelukast (SINGULAIR) 10 mg tablet Take 10 mg by mouth daily.  omalizumab (XOLAIR) 150 mg solr by SubCUTAneous route once.        Allergies   Allergen Reactions    Bee Venom Protein (Honey Bee) Sneezing    Pollen Extracts Shortness of Breath    Shellfish Derived Swelling    Shellfish Derived Shortness of Breath and Swelling    Soy Shortness of Breath and Swelling    Tomato Swelling     Past Medical History:   Diagnosis Date    Asthma     Bronchiolitis     Epilepsy ([facility] [address]     Narcolepsy     Pneumonia      Past Surgical History:   Procedure Laterality Date    HX LOBECTOMY Right     HX LOBECTOMY      right upper lung     Family History   Problem Relation Age of Onset    Hypertension Mother     Diabetes Father     Diabetes Maternal Grandfather     Diabetes Paternal Grandmother      Social History     Tobacco Use    Smoking status: Never Smoker    Smokeless tobacco: Never Used   Substance Use Topics    Alcohol use: Yes     Comment: ocassionally        Lab Results   Component Value Date/Time    WBC 8.9 10/10/2018 10:14 PM    HGB 12.3 10/10/2018 10:14 PM    HCT 39.4 10/10/2018 10:14 PM    PLATELET 466 18/61/9611 10:14 PM    MCV 79.8 10/10/2018 10:14 PM       Lab Results   Component Value Date/Time    PLATELET 526 08/74/0310 10:14 PM     Lab Results   Component Value Date/Time    GFR est non-AA >60 10/10/2018 10:14 PM    GFR est AA >60 10/10/2018 10:14 PM    Creatinine 0.83 10/10/2018 10:14 PM    BUN 12 10/10/2018 10:14 PM    Sodium 140 10/10/2018 10:14 PM    Potassium 4.2 10/10/2018 10:14 PM    Chloride 107 10/10/2018 10:14 PM    CO2 31 10/10/2018 10:14 PM        ROS: Feeling generally well. No TIA's or unusual headaches, no dysphagia. No prolonged cough. No dyspnea or chest pain on exertion. No abdominal pain, change in bowel habits, black or bloody stools. No urinary tract symptoms. No new or unusual musculoskeletal symptoms. Specific concerns today: none. Objective: The patient appears well, alert, oriented x 3, in no distress. Visit Vitals  /83 (BP 1 Location: Left arm, BP Patient Position: Sitting)   Pulse 84   Temp 99.1 °F (37.3 °C) (Oral)   Resp 16   Ht 5' 2\" (1.575 m)   Wt 240 lb (108.9 kg)   SpO2 96%   BMI 43.90 kg/m²     ENT normal.  Neck supple. No adenopathy or thyromegaly. NONA. Lungs are clear, good air entry, no wheezes, rhonchi or rales. S1 and S2 normal, no murmurs, regular rate and rhythm.  Abdomen soft without tenderness, guarding, mass or organomegaly. Extremities show no edema, normal peripheral pulses. Neurological is normal, no focal findings. Breast and Pelvic exams are deferred. Some TTP in upper back; Assessment/Plan:   Well Woman  increase physical activity, follow low fat diet, follow low salt diet, continue present plan, routine labs ordered    ICD-10-CM ICD-9-CM    1. Well woman exam (no gynecological exam) Z00.00 V70.0     [V70.0]   2. Encounter for immunization Z23 V03.89 INFLUENZA VIRUS VAC QUAD,SPLIT,PRESV FREE SYRINGE IM   3. Screening for cardiovascular condition Z13.6 V81.2 LIPID PANEL      METABOLIC PANEL, BASIC   4. Symptomatic mammary hypertrophy- new N62 611.1 REFERRAL TO PLASTIC SURGERY       As above,   treatment plan as listed below  Orders Placed This Encounter    Influenza virus vaccine (QUADRIVALENT PRES FREE SYRINGE) IM (84909)    LIPID PANEL    METABOLIC PANEL, BASIC    REFERRAL TO PLASTIC SURGERY    montelukast (SINGULAIR) 10 mg tablet    mometasone-formoterol (DULERA) 100-5 mcg/actuation HFA inhaler     Follow-up Disposition:  Return in about 1 year (around 10/23/2019) for well exam.  An After Visit Summary was printed and given to the patient. This has been fully explained to the patient, who indicates understanding.

## 2018-11-27 ENCOUNTER — OFFICE VISIT (OUTPATIENT)
Dept: FAMILY MEDICINE CLINIC | Age: 23
End: 2018-11-27

## 2018-11-27 VITALS
OXYGEN SATURATION: 98 % | WEIGHT: 242.6 LBS | HEART RATE: 89 BPM | RESPIRATION RATE: 16 BRPM | SYSTOLIC BLOOD PRESSURE: 125 MMHG | BODY MASS INDEX: 44.64 KG/M2 | HEIGHT: 62 IN | DIASTOLIC BLOOD PRESSURE: 76 MMHG | TEMPERATURE: 98.5 F

## 2018-11-27 DIAGNOSIS — E66.01 OBESITY, CLASS III, BMI 40-49.9 (MORBID OBESITY) (HCC): ICD-10-CM

## 2018-11-27 DIAGNOSIS — H00.014 HORDEOLUM EXTERNUM OF LEFT UPPER EYELID: Primary | ICD-10-CM

## 2018-11-27 RX ORDER — ERYTHROMYCIN 5 MG/G
OINTMENT OPHTHALMIC
Qty: 3.5 G | Refills: 0 | Status: SHIPPED | OUTPATIENT
Start: 2018-11-27 | End: 2019-06-19 | Stop reason: ALTCHOICE

## 2018-11-27 NOTE — PATIENT INSTRUCTIONS
Styes and Chalazia: Care Instructions  Your Care Instructions    Styes and chalazia (say \"nue-TFI-gch-uh\") are both conditions that can cause swelling of the eyelid. A stye is an infection in the root of an eyelash. The infection causes a tender red lump on the edge of the eyelid. The infection can spread until the whole eyelid becomes red and inflamed. Styes usually break open, and a tiny amount of pus drains. They usually clear up on their own in about a week, but they sometimes need treatment with antibiotics. A chalazion is a lump or cyst in the eyelid (chalazion is singular; chalazia is plural). It is caused by swelling and inflammation of deep oil glands inside the eyelid. Chalazia are usually not infected. They can take a few months to heal.  If a chalazion becomes more swollen and painful or does not go away, you may need to have it drained by your doctor. Follow-up care is a key part of your treatment and safety. Be sure to make and go to all appointments, and call your doctor if you are having problems. It's also a good idea to know your test results and keep a list of the medicines you take. How can you care for yourself at home? · Do not rub your eyes. Do not squeeze or try to open a stye or chalazion. · To help a stye or chalazion heal faster:  ? Put a warm, moist compress on your eye for 5 to 10 minutes, 3 to 6 times a day. Heat often brings a stye to a point where it drains on its own. Keep in mind that warm compresses will often increase swelling a little at first.  ? Do not use hot water or heat a wet cloth in a microwave oven. The compress may get too hot and can burn the eyelid. · Always wash your hands before and after you use a compress or touch your eyes. · If the doctor gave you antibiotic drops or ointment, use the medicine exactly as directed. Use the medicine for as long as instructed, even if your eye starts to feel better.   · To put in eyedrops or ointment:  ? Tilt your head back, and pull your lower eyelid down with one finger. ? Drop or squirt the medicine inside the lower lid. ? Close your eye for 30 to 60 seconds to let the drops or ointment move around. ? Do not touch the ointment or dropper tip to your eyelashes or any other surface. · Do not wear eye makeup or contact lenses until the stye or chalazion heals. · Do not share towels, pillows, or washcloths while you have a stye. When should you call for help? Call your doctor now or seek immediate medical care if:    · You have pain in your eye.     · You have a change in vision or loss of vision.     · Redness and swelling get much worse.    Watch closely for changes in your health, and be sure to contact your doctor if:    · Your stye does not get better in 1 week.     · Your chalazion does not start to get better after several weeks. Where can you learn more? Go to http://chiquis-tran.info/. Enter V587 in the search box to learn more about \"Styes and Chalazia: Care Instructions. \"  Current as of: December 3, 2017  Content Version: 11.8  © 0239-3266 [employer]. Care instructions adapted under license by [employer] (which disclaims liability or warranty for this information). If you have questions about a medical condition or this instruction, always ask your healthcare professional. [address] any warranty or liability for your use of this information.

## 2018-11-27 NOTE — PROGRESS NOTES
HISTORY OF PRESENT ILLNESS  Merry Fleischer is a 21 y.o. female. Eyelid Inflammation   The history is provided by the patient. This is a new problem. Episode onset: 3 days  The problem has not changed (swelling worse in the morning and at night. Pain with blinking) since onset. She has tried a warm compress for the symptoms. The treatment provided no relief. Allergies   Allergen Reactions    Bee Venom Protein (Honey Bee) Sneezing    Pollen Extracts Shortness of Breath    Shellfish Derived Swelling    Shellfish Derived Shortness of Breath and Swelling    Soy Shortness of Breath and Swelling    Tomato Swelling     Current Outpatient Medications   Medication Sig Dispense Refill    montelukast (SINGULAIR) 10 mg tablet Take 1 Tab by mouth daily. 30 Tab 6    mometasone-formoterol (DULERA) 100-5 mcg/actuation HFA inhaler Take 2 Puffs by inhalation two (2) times a day. 1 Inhaler 3    etonogestrel (NEXPLANON SDRM) by SubDERmal route.  levalbuterol tartrate (XOPENEX HFA) 45 mcg/actuation inhaler Take 2 Puffs by inhalation every four (4) hours as needed for Wheezing.  fluticasone (CHILDREN'S FLONASE ALLERGY RLF) 50 mcg/actuation nasal spray 2 Sprays by Both Nostrils route daily.  EPINEPHrine (EPIPEN) 0.3 mg/0.3 mL injection 0.3 mg by IntraMUSCular route once as needed.  albuterol (PROVENTIL HFA, VENTOLIN HFA, PROAIR HFA) 90 mcg/actuation inhaler Take 2 Puffs by inhalation every four (4) hours as needed for Wheezing. 1 Inhaler 0    omalizumab (XOLAIR) 150 mg solr by SubCUTAneous route once.        Past Medical History:   Diagnosis Date    Asthma     Bronchiolitis     Epile[facility] [address]     Narcolepsy     Pneumonia      Social History     Socioeconomic History    Marital status: SINGLE     Spouse name: Not on file    Number of children: Not on file    Years of education: Not on file    Highest education level: Not on file   Social Needs    Financial resource strain: Not on file   Joao-Miley insecurity - worry: Not on file    Food insecurity - inability: Not on file    Transportation needs - medical: Not on file   Estimize needs - non-medical: Not on file   Occupational History    Occupation: thomas   Tobacco Use    Smoking status: Never Smoker    Smokeless tobacco: Never Used   Substance and Sexual Activity    Alcohol use: Yes     Comment: ocassionally    Drug use: No    Sexual activity: No     Birth control/protection: Implant   Other Topics Concern     Service Not Asked    Blood Transfusions Not Asked    Caffeine Concern Yes     Comment: 1 cup a month    Occupational Exposure Not Asked    Hobby Hazards Not Asked    Sleep Concern Not Asked    Stress Concern Not Asked    Weight Concern Not Asked    Special Diet Not Asked    Back Care Not Asked    Exercise Yes     Comment: walks 5 minutes 5 times a day    Bike Helmet Not Asked    Seat Belt Yes    Self-Exams Not Asked   Social History Narrative    ** Merged History Encounter **          Review of Systems   Eyes: Positive for blurred vision (intermittently on left ), photophobia (left ) and pain (with blinking ). Negative for discharge and redness. /76 (BP 1 Location: Left arm)   Pulse 89   Temp 98.5 °F (36.9 °C) (Oral)   Resp 16   Ht 5' 2\" (1.575 m)   Wt 242 lb 9.6 oz (110 kg)   SpO2 98%   BMI 44.37 kg/m²   Physical Exam   Constitutional: She appears well-developed and well-nourished. No distress. Eyes: Conjunctivae and EOM are normal. Pupils are equal, round, and reactive to light. Left eye exhibits hordeolum (upper lid ). Left eye exhibits no discharge. Neck: Normal range of motion. Neck supple. Cardiovascular: Normal rate, regular rhythm and normal heart sounds. Exam reveals no gallop and no friction rub. No murmur heard. Pulmonary/Chest: Effort normal and breath sounds normal. She has no wheezes. She has no rhonchi. She has no rales. ASSESSMENT and PLAN    ICD-10-CM ICD-9-CM    1. Hordeolum externum of left upper eyelid H00.014 373.11    2. Obesity, Class III, BMI 40-49.9 (morbid obesity) ([facility]) E66.01 278.01    alarm signs when to seek emergent care provided and reviewed   I have discussed the diagnosis with the patient and the intended plan as seen in the above orders. The patient has received an after-visit summary and questions were answered concerning future plans. I have discussed medication side effects and warnings with the patient as well. Patient agreeable with above plan and verbalizes understanding. Follow-up Disposition:  Return in about 4 days (around 12/1/2018), or if symptoms worsen or fail to improve, for left eye stye if not improved .

## 2018-11-27 NOTE — PROGRESS NOTES
Pt is here for L eyelid swelling x 3 days    1. Have you been to the ER, urgent care clinic since your last visit? Hospitalized since your last visit? No    2. Have you seen or consulted any other health care providers outside of the [address] [place] [address] since your last visit? Include any pap smears or colon screening.  No

## 2018-12-18 ENCOUNTER — HOSPITAL ENCOUNTER (OUTPATIENT)
Dept: SLEEP MEDICINE | Age: 23
Discharge: HOME | End: 2018-12-18
Payer: COMMERCIAL

## 2018-12-18 DIAGNOSIS — G47.10 HYPERSOMNIA: ICD-10-CM

## 2018-12-18 PROCEDURE — 95810 POLYSOM 6/> YRS 4/> PARAM: CPT

## 2019-01-03 RX ORDER — MONTELUKAST SODIUM 10 MG/1
10 TABLET ORAL DAILY
Qty: 30 TAB | Refills: 6 | Status: SHIPPED | OUTPATIENT
Start: 2019-01-03 | End: 2021-04-19 | Stop reason: SDUPTHER

## 2019-02-01 ENCOUNTER — OFFICE VISIT (OUTPATIENT)
Dept: PULMONOLOGY | Age: 24
End: 2019-02-01

## 2019-02-01 VITALS
HEIGHT: 62 IN | HEART RATE: 75 BPM | OXYGEN SATURATION: 97 % | TEMPERATURE: 98.4 F | DIASTOLIC BLOOD PRESSURE: 76 MMHG | SYSTOLIC BLOOD PRESSURE: 106 MMHG | BODY MASS INDEX: 44.9 KG/M2 | WEIGHT: 244 LBS | RESPIRATION RATE: 20 BRPM

## 2019-02-01 DIAGNOSIS — G47.10 HYPERSOMNIA: Primary | ICD-10-CM

## 2019-02-01 DIAGNOSIS — J47.9 BRONCHIECTASIS WITHOUT COMPLICATION (HCC): ICD-10-CM

## 2019-02-01 DIAGNOSIS — E66.01 OBESITY, MORBID (HCC): ICD-10-CM

## 2019-02-01 DIAGNOSIS — N62 BREAST HYPERTROPHY IN FEMALE: ICD-10-CM

## 2019-02-01 DIAGNOSIS — J45.50 SEVERE PERSISTENT ASTHMA WITHOUT COMPLICATION: ICD-10-CM

## 2019-02-01 NOTE — PROGRESS NOTES
[facility] Pulmonary, Critical Care, and Sleep Medicine Office Progress Note- Follow Up 
 
 
Primary Care Physician: Diego Felton MD  
 
Reason for Visit:  Evaluation for possible sleep disorder Assessment: 1. Hypersomnia- doubt narcolepsy at this time. May have ELLIS, chronic insomnia, circadian rhythm disorder, poor sleep hygiene idiopathic hypersomnia, etc.  Mood appears better today. 2. Poor Sleep hygiene 3. Asthma- well controled on multi-modality therapy- Pulmonologist- Dr. Yue Olivas 4. Seasonal allergies- pollen and molds 5. Bronchiectasis 6. Eczema/Atopy 7. Migraine headaches- currently controlled 8. Morbid obesity: Body mass index is 44.63 kg/m². 9. Breast hypertrophy- body image difficulty? Contributing to dyspnea on exertion- under evaluation by plastic surgeon 10. S/P right thoracotomy with RUL resection- bronchiectasis vs congenital malformation- 7/8/13 ([facility]):Path report Bronchiectasis, chronic bronchitis and pleural fibrosis 11. H/O hilar adenopathy- benign hyperplasia of path report 12. S/P Bronchoscope  6/18/2009 and Nasal-Adenoid and carinal biopsy 5/14/13- negative for ciliary dyskinesia- [facility] Plan: · · Schedule patient for actigraphy x 2 weeks to better assess daily sleep wake cycle · May consider repeat PSG with MSLT pending results of actigraphy · Home overnight pulse oximetry to reassess SpO2- Sleep testing was limited due to reduced sleep time and I am not sure if the SpO2 recording during that study was truly reliable. Consider ETCO2 study as well. · Follow up with Plastic Surgery · Potential consequences of untreated sleep apnea, and/or excessive daytime sleepiness were discussed with the patient. · Educational materials provided. · Healthy lifestyle changes to include weight loss and exercise discussed. - I have encouraged the patient to obtain an activity tracker and work towards daily goal of 10K steps/day · Healthy sleep habits were reviewed and encouraged. · Follow-up in 3 months, sooner should new symptoms or problems arise. · Follow up with Primary Care Provider (PCP) as directed and for routine health care maintenance. History of Present Illness: Ms. Marko Eaton is a 21 y.o. female patient who presents for evaluation of a possible sleep disorder. The history was provided by extensive chart review, the patient, and her mother. The patient has a long history of hypersomnia. She has undergone at least 2 sleep studies and an MSLT as an adolescent at [facility]. She has some benefit form stimulant therapy but she has also maintained an inconsistent sleep pattern that has made evaluation difficult. From my review of the patient's chart she has never been diagnosed with a seizure disorder nor narcolepsy. Since last visit, the patient underwent a repeat sleep study which was limited due to reduced sleep efficiency. During sleep her SpO2 was >94%, Some decreased SpO2 readings noted while awake. However, given the reduced sleep efficiency, excessive movement it is  unclear how reliable SpO2 readings were reliable. The patient's mood appears much more upbeat. She states she has friend that she is handing out a lot with. She has standardized her work schedule. She and her mother went to a plastic surgeon who wanted to pursue breast reduction for the patient but it was declined by the patient's insurance. The patient reports that she is still experiencing the same level of hypersomnia as before. Occupation:   - [address] Initial Sleep History Intake:  
Prior Sleep/Stimulant Medications · Provigil- triggered headaches · Ritalin/Concerta- triggered headaches · Adderall XR 20mg BID- did improve alertness Driving: No- Never  Snoring: This is a chronic, mild  problem which has been ongoing for years. Fatigue: This is a Chronic problem which has been ongoing for years. Kashif today is 14/24 Dental: Teeth clenching or grindingis reported. Jaw and teeth hurt when she wakes up. Naps:  She naps spontaneously when she gets home from work- nearly every day. She will often be laying on the cough, or in her room on her bed looking at her phone. Leg Symptoms/Pain: She does not have unpleasant or crawling sensation in legs or strong urge to move when inactive. She does report occassional  \"candace horse\" type cramps which wakes her from sleep at least once weekly. GERD: is reported. She does not take medication. She reports frequent symptoms after eating. She was previously prescribed an unknown medication but it was too expensive to continue. Mood: Describes herself as a sad person. She is frustrated. with her health. She does report episodes of crying. No prior psychiatric/psycological. 
 
Sleep-Wake History: She is not sure if she has a preferred sleep time. She would not characterize herself as either a morning dove or night owl. Estimates sleeping approximately 6-7 hours of broken sleep per night/day. She gets into bed at approximately [id number]. Once in bed, she will watch TV, or movies on her phone. It usually takes up to 2 hours  to fall asleep after going to bed. Once she puts her phone down she will lay quietly and look up at the ceiling. The room is normally dark. Black out curtains are present. Once asleep, she will toss and turn. She findings it difficulty to find a position of comfort which she attributes to her breast side. She is unable to lay on her back or chest. If she lays on her back, she feels smothered. She uses 3 pillows to place around her and to aid her sleeping on her side. She denies dream recall. She denies any specific pain. She has a history of migraine headaches but they do not interfer with her sleep. She typically does not need to get up to use the restroom. Depending on her work schedule she will get up from [phone]. She awakens to an alarm clock on her phone. She will immediately grab her phone and start scrolling through messages for about 5 minutes. She typically does not hit the snooze button. She never requires someone prompting her to get out of bed. When she first gets out of bed she feels tired. Reports  waking up with a morning headache about 3 days/week. Reports wakening with a dry mouth 4 times weekly. Denies symptoms suggestive of cataplexy- she states with laughter she finds it hard to breath. In chart review, her prior pediatric neurologist reported some questionable leg weakness with laughter. When I asked the patient, she reported she vaguely remember some issues with laughing years ago but she could not recall the specifics and that event has not re-occurred. Denies sleep paralysis Denies current or past hypnagogic and/or hypnopompic hallucinations. She denies ever hearing voices or sounds. Denies sleep talking/ walking, or other parasomnia behaviors Family Sleep History: - Maternal aunte- ELLIS- CPAP Stop Salome Valentin 12/18/2018 10/10/2018 Does the patient snore loudly (louder than talking or loud enough to be heard through closed doors)? 0 0 Does the patient often feel tired, fatigued, or sleepy during the daytime, even after a \"good\" night's sleep? 1 1 Has anyone ever observed the patient stop breathing during their sleep? 1 1 Does the patient have or are they being treated for high blood pressure? 0 0 Is the patient's BMI greater than 35? 1 1 Is your neck circumference greater than 17 inches (Male) or 16 inches (Female)? 0 1 Is the patient older than 50? 0 0 Is the patient male? 0 0  
ELLIS Score 3 4 PHQ over the last two weeks 2/1/2019 Little interest or pleasure in doing things Not at all Feeling down, depressed, irritable, or hopeless Not at all Total Score PHQ 2 0  
 
 
 [place] Scale 2/1/2019 12/18/2018 10/10/2018 Sitting and Reading 1 3 2 Watching TV 3 0 3 Sitting, inactive in a public place (e.g. a movie theater or meeting) 1 0 2 As a passenger in a car for an hour, without a break 3 3 3 Lying down to rest in the afternoon, when circumstances permit 2 0 3 Sitting and talking to someone 2 0 1 Sitting quietly after lunch without alcohol 2 3 0 In a car, while stopped for a few minutes in traffic 1 3 3 [place] Sleepiness Score 15 12 17 Past Medical History: 
Past Medical History:  
Diagnosis Date  Asthma  Bronchiolitis  Epilepsy ([facility] [address]  Narcolepsy  Pneumonia Past Surgical History: 
Past Surgical History:  
Procedure Laterality Date  HX LOBECTOMY Right  HX LOBECTOMY    
 right upper lung Family History: 
Family History Problem Relation Age of Onset  Hypertension Mother  Diabetes Father  Diabetes Maternal Grandfather  Diabetes Paternal Grandmother Social History: 
Social History Tobacco Use  Smoking status: Never Smoker  Smokeless tobacco: Never Used Substance Use Topics  Alcohol use: Yes Comment: ocassionally  Drug use: No  
  
 
Caffeine Amount Time of last Intake Comments Coffee Occasional    
Soda Occasional    
Tea None Energy Drinks None Over- the - counter stimulant pills None Other Substances Alcohol Occassional  wine Tobacco None Drugs Denies Medications: 
Current Outpatient Medications on File Prior to Visit Medication Sig Dispense Refill  montelukast (SINGULAIR) 10 mg tablet Take 1 Tab by mouth daily. 30 Tab 6  
 mometasone-formoterol (DULERA) 100-5 mcg/actuation HFA inhaler Take 2 Puffs by inhalation two (2) times a day. 1 Inhaler 3  
 etonogestrel (NEXPLANON SDRM) by SubDERmal route.     
 levalbuterol tartrate (XOPENEX HFA) 45 mcg/actuation inhaler Take 2 Puffs by inhalation every four (4) hours as needed for Wheezing.  EPINEPHrine (EPIPEN) 0.3 mg/0.3 mL injection 0.3 mg by IntraMUSCular route once as needed.  albuterol (PROVENTIL HFA, VENTOLIN HFA, PROAIR HFA) 90 mcg/actuation inhaler Take 2 Puffs by inhalation every four (4) hours as needed for Wheezing. 1 Inhaler 0  
 omalizumab (XOLAIR) 150 mg solr by SubCUTAneous route once.  erythromycin (ILOTYCIN) ophthalmic ointment Place small ribbon to left eye every 6 hours for 5 days 3.5 g 0  
 fluticasone (CHILDREN'S FLONASE ALLERGY RLF) 50 mcg/actuation nasal spray 2 Sprays by Both Nostrils route daily. No current facility-administered medications on file prior to visit. Allergy: Allergies Allergen Reactions  Bee Venom Protein (Honey Bee) Sneezing  Pollen Extracts Shortness of Breath  Shellfish Derived Swelling  Shellfish Derived Shortness of Breath and Swelling  Soy Shortness of Breath and Swelling  Tomato Swelling Review of Systems General ROS: positive for  - fatigue, sleep disturbance and weight gain 
negative for - chills, fever, hot flashes, malaise, night sweats or weight loss ENT ROS: negative for - epistaxis, hearing change, nasal congestion, nasal discharge, nasal polyps, oral lesions, sinus pain, sneezing, sore throat, tinnitus or vertigo Hematological and Lymphatic ROS: negative for - bleeding problems, blood clots, bruising, jaundice, pallor or swollen lymph nodes Endocrine ROS: negative for - polydipsia/polyuria, skin changes, temperature intolerance or unexpected weight changes Respiratory ROS: positive for - shortness of breath and feeling smothered by the weight of her breast 
negative for - cough, hemoptysis, orthopnea, pleuritic pain, sputum changes, stridor, tachypnea or wheezing Cardiovascular ROS: positive for - chest pain and dyspnea on exertion 
negative for - edema, irregular heartbeat, loss of consciousness, murmur, orthopnea, palpitations, paroxysmal nocturnal dyspnea, rapid heart rate or shortness of breath Gastrointestinal ROS: no abdominal pain, change in bowel habits, or black or bloody stools Genito-Urinary ROS: no dysuria, trouble voiding, or hematuria Musculoskeletal ROS: + back pain- went to see chiropractor Neurological ROS: no TIA or stroke symptoms Dermatological ROS: negative for - pruritus, rash or skin lesion changes Psychological ROS: as kemar Otherwise negative. Physical Exam: 
Blood pressure 106/76, pulse 75, temperature 98.4 °F (36.9 °C), temperature source Oral, resp. rate 20, height 5' 2\" (1.575 m), weight 110.7 kg (244 lb), SpO2 97 %. on RA, Body mass index is 44.63 kg/m². General: in no respiratory distress, acyanotic, appears stated age, cooperative, pleasant, morbidly obese body habitus HEENT: PERRL, EOMI, throat without erythema or exudate, Tongue- dental indention on tongue, Mallampati's score 2+, Uvula- midline Neck: Supple,  no abnormally enlarged lymph nodes, thyroid is not enlarged, non-tender, No JVD Chest: + breast hypertrophy, well healed scar right lateral chest wall Lungs: moderate air entry, clear to auscultation bilaterally, Heart: Regular rate and rhythm, S1S2 present, without murmur Abdomen: Obese, bowel sounds normoactive, abdomen is soft without significant tenderness, or guarding Extremity: negative for edema, cyanosis or clubbing Skin: Skin color, texture, turgor normal. No rashes or lesions Data Reviewed: CBC:  
Lab Results Component Value Date/Time WBC 8.9 10/10/2018 10:14 PM  
 HGB 12.3 10/10/2018 10:14 PM  
 HCT 39.4 10/10/2018 10:14 PM  
 PLATELET 508 83/48/1301 10:14 PM  
 MCV 79.8 10/10/2018 10:14 PM  
 
 
BMP:  
Lab Results Component Value Date/Time  Sodium 140 10/10/2018 10:14 PM  
 Potassium 4.2 10/10/2018 10:14 PM  
 Chloride 107 10/10/2018 10:14 PM  
 CO2 31 10/10/2018 10:14 PM  
 Anion gap 2 (L) 10/10/2018 10:14 PM  
 Glucose 78 10/10/2018 10:14 PM  
 BUN 12 10/10/2018 10:14 PM  
 Creatinine 0.83 10/10/2018 10:14 PM  
 BUN/Creatinine ratio 14 10/10/2018 10:14 PM  
 GFR est AA >60 10/10/2018 10:14 PM  
 GFR est non-AA >60 10/10/2018 10:14 PM  
 Calcium 8.5 10/10/2018 10:14 PM  
  
 
TSH: 
No results found for: TSH, TSH2, TSH3, TSHP, TSHELE, TSHEXT, TSHEXT Imaging: 
[x]I have personally reviewed the patients radiographs section Results from Hospital Encounter encounter on 10/10/18 XR CHEST PORT Narrative EXAM: Chest Radiograph INDICATION: Left-sided chest pain TECHNIQUE: AP view of the chest 
 
COMPARISON: 2018, 2016, 2016 and 6/15/2009 FINDINGS: No pneumothorax identified. The lungs are clear. No infiltrates 
appreciated. No effusions identified. The cardiomediastinal silhouette is 
unremarkable. The pulmonary vasculature is unremarkable. The osseous 
structures are unremarkable. Impression Impression: 1. No acute cardiopulmonary process. No results found for this or any previous visit. Cardiac Echo:  
 
No results found for this or any previous visit. Historical Sleep Testing Data: 
- PSG- [facility]: 13: AHI: 1.8, REM 4.8, mild snoring, desat index 0.2, Sleep efficiency 82% Sleep latency 46 min. REM sleep latency 145.5 min 
- PSG- [facility]: 14- times slept: 5.5 hours. AHI:0, REM 9%  Sleep Efficiency: 69% - MSLT- [facility]: 14: MSL: 13.80 minutes, No SOREMs ( Medications- Dulera, naproxen, Singulair, Amitriptyline, Qvar) - [facility]: Sleep Efficiency 36%: Sleep Time 2.8 hours. Sleep latency: 101.5  AHI:0  SpO2 while sleep >94% Ana Rosa Rico DO, FCCP Pulmonary, Sleep and Critical Care Medicine

## 2019-02-01 NOTE — PROGRESS NOTES
Chief Complaint Patient presents with  Sleep Problem Study done 12/18-patient does not have cpap machine 1. Have you been to the ER, urgent care clinic since your last visit? Hospitalized since your last visit? Yes Where: [facility] 2. Have you seen or consulted any other health care providers outside of the [address] [place] [address] since your last visit? Include any pap smears or colon screening.  No

## 2019-06-19 ENCOUNTER — OFFICE VISIT (OUTPATIENT)
Dept: PULMONOLOGY | Age: 24
End: 2019-06-19

## 2019-06-19 VITALS
SYSTOLIC BLOOD PRESSURE: 118 MMHG | WEIGHT: 248.2 LBS | OXYGEN SATURATION: 99 % | BODY MASS INDEX: 45.67 KG/M2 | RESPIRATION RATE: 18 BRPM | DIASTOLIC BLOOD PRESSURE: 64 MMHG | HEART RATE: 73 BPM | TEMPERATURE: 98.2 F | HEIGHT: 62 IN

## 2019-06-19 DIAGNOSIS — E66.01 OBESITY, MORBID (HCC): ICD-10-CM

## 2019-06-19 DIAGNOSIS — F43.21 GRIEF REACTION: ICD-10-CM

## 2019-06-19 DIAGNOSIS — G47.00 INSOMNIA, UNSPECIFIED TYPE: ICD-10-CM

## 2019-06-19 DIAGNOSIS — G47.10 HYPERSOMNIA: Primary | ICD-10-CM

## 2019-06-19 DIAGNOSIS — Z72.821 POOR SLEEP HYGIENE: ICD-10-CM

## 2019-06-19 NOTE — PROGRESS NOTES
Gerald Zhu presents today for   Chief Complaint   Patient presents with    Hypersomnia     follow up from 2/1/2019    Asthma    Bronchiectasis       Is someone accompanying this pt? No    Is the patient using any DME equipment during OV? No    -DME Company N/A    Depression Screening:  3 most recent PHQ Screens 6/19/2019   Little interest or pleasure in doing things Not at all   Feeling down, depressed, irritable, or hopeless Not at all   Total Score PHQ 2 0       Learning Assessment:  Learning Assessment 6/26/2018   PRIMARY LEARNER Patient   HIGHEST LEVEL OF EDUCATION - PRIMARY LEARNER  SOME COLLEGE   BARRIERS PRIMARY LEARNER [address] CAREGIVER Yes   CO-LEARNER NAME Penny Rodríguez   CO-LEARNER HIGHEST LEVEL OF EDUCATION GRADUATED HIGH SCHOOL OR GED   BARRIERS CO-LEARNER NONE   PRIMARY LANGUAGE ENGLISH   PRIMARY LANGUAGE [facility]    NEED No   LEARNER PREFERENCE PRIMARY LISTENING     READING     DEMONSTRATION   LEARNER PREFERENCE CO-LEARNER DEMONSTRATION   LEARNING SPECIAL TOPICS no   ANSWERED BY patient   RELATIONSHIP SELF       Abuse Screening:  No flowsheet data found. Fall Risk  No flowsheet data found. Coordination of Care:  1. Have you been to the ER, urgent care clinic since your last visit? Hospitalized since your last visit? No    2. Have you seen or consulted any other health care providers outside of the [address] [place] [address] since your last visit? Include any pap smears or colon screening.  No

## 2019-06-19 NOTE — LETTER
6/19/19 Patient: Yael Dolan YOB: 1995 Date of Visit: 6/19/2019 Agnieszka Reddy MD 
[address] [facility] [address] [place] [id number] VIA In Basket Dear Agnieszka Reddy MD, Thank you for referring Ms. Marbella Reed to [facility] for evaluation. My notes for this consultation are attached. If you have questions, please do not hesitate to call me. I look forward to following your patient along with you.  
 
 
Sincerely, 
 
Duedavion Anne, DO

## 2019-06-19 NOTE — PROGRESS NOTES
[facility]  Pulmonary, Critical Care, and Sleep Medicine    Office Progress Note- Follow Up      Primary Care Physician: David Sheehan MD     Reason for Visit:  Evaluation for possible sleep disorder    Assessment:  1. Hypersomnia- doubt narcolepsy at this time. May have ELLIS, chronic insomnia, circadian rhythm disorder, poor sleep hygiene idiopathic hypersomnia, depression etc.  Mood appears good today. 2. Possible depression/anxiety???  3. Poor Sleep hygiene  4. Asthma- well controled on multi-modality therapy- Pulmonologist- Dr. Yuni Langley  5. Seasonal allergies- pollen and molds  6. Bronchiectasis  7. Eczema/Atopy   8. Migraine headaches- currently controlled  9. Morbid obesity: Body mass index is 45.4 kg/m². 10. Breast hypertrophy- body image difficulty? Contributing to dyspnea on exertion- under evaluation by plastic surgeon  11. S/P right thoracotomy with RUL resection- bronchiectasis vs congenital malformation- 7/8/13 ([facility]):Path report Bronchiectasis, chronic bronchitis and pleural fibrosis  12. H/O hilar adenopathy- benign hyperplasia of path report  13. S/P Bronchoscope  6/18/2009 and Nasal-Adenoid and carinal biopsy 5/14/13- negative for ciliary dyskinesia- [facility]      Plan:    · Patient given sleep diary- she is to start after she moves into her new home and keep for one month and return to our office  · Patient needs to get 7-9 hours of sleep per night  · OK to use Melatonin- patient to log on her diary  · Consider formal counseling to discuss life stressors  · Follow up with Plastic Surgery  · Potential consequences of untreated sleep apnea, and/or excessive daytime sleepiness were discussed with the patient. · Educational materials provided. · Healthy lifestyle changes to include weight loss and exercise discussed. - I have encouraged the patient to obtain an activity tracker and work towards daily goal of 10K steps/day- This was again discussed today  · Healthy sleep habits were reviewed and encouraged. · Follow-up in 3 months, sooner should new symptoms or problems arise. · Follow up with Primary Care Provider (PCP) as directed and for routine health care maintenance. History of Present Illness: Ms. Richie Cheng is a 21 y.o. female patient who presents for follow up of hypersomniaThe history was provided by extensive chart review, the patient. .    The patient has a long history of hypersomnia. She has undergone at least 2 sleep studies and an MSLT as an adolescent at [facility]. She has some benefit form stimulant therapy but she has also maintained an inconsistent sleep pattern that has made evaluation difficult. From my review of the patient's chart she has never been diagnosed with a seizure disorder nor narcolepsy. A repeat sleep study was performed at our facility which was limited due to reduced sleep efficiency. The patient reports that she is still experiencing the same level of hypersomnia as before. She can readily fall asleep as a passenger in a care    There was concern about varying sleep times which could lead to false positive on MSLT testing. The plan was to try and obtain actigraphy but that was not done. She still continues to work at [employer] and enjoys her job. She works 2 shifts  - 3081-1004 - she will go to bed at [phone] and awaken at 0530 for these shifts  - 0934-4214 - she will go to bed at [phone] and awaken at 0900 for these shifts    She reports tossing and turning at night. She finds taking OTC melatonin 10 mg before bed helps her sleep. She does not always this medication. She reports that her uncle and female cousin were killed in their apartment across the street from where she lives. She states she is more apprehensive about sleeping at night. She is now sleeping with the TV on (level4). She puts her phone on \"Do Not Disturb\" mode. She and her family are planning to move from their apartment to home next week.      She does endorse sleeping is a way to cope with social stressors and to disconnect from the stressors around her. She has not undergone any counseling. Exercise: she gets her exercise when she walks around at her job- she does not track her steps or activity. She wants to pursue breast reduction but she must loose 30 lbs first.     She has not had any asthma exacerbations since her last visit                                       Initial Sleep History Intake:   Occupation:   - [employer]         Prior Sleep/Stimulant Medications  · Provigil- triggered headaches  · Ritalin/Concerta- triggered headaches  · Adderall XR 20mg BID- did improve alertness     Driving: No- Never     Snoring: This is a chronic, mild  problem which has been ongoing for years. Fatigue: This is a Chronic problem which has been ongoing for years. [place] today is 14/24    Dental: Teeth clenching or grindingis reported. Jaw and teeth hurt when she wakes up. Naps:  She naps spontaneously when she gets home from work- nearly every day. She will often be laying on the cough, or in her room on her bed looking at her phone. Leg Symptoms/Pain: She does not have unpleasant or crawling sensation in legs or strong urge to move when inactive. She does report occassional  \"candace horse\" type cramps which wakes her from sleep at least once weekly. GERD: is reported. She does not take medication. She reports frequent symptoms after eating. She was previously prescribed an unknown medication but it was too expensive to continue. Mood: Describes herself as a sad person. She is frustrated. with her health. She does report episodes of crying. No prior psychiatric/psycological.    Sleep-Wake History:     She is not sure if she has a preferred sleep time. She would not characterize herself as either a morning dove or night owl. Estimates sleeping approximately 6-7 hours of broken sleep per night/day.     She gets into bed at approximately 1810-2968. Once in bed, she will watch TV, or movies on her phone. It usually takes up to 2 hours  to fall asleep after going to bed. Once she puts her phone down she will lay quietly and look up at the ceiling. The room is normally dark. Black out curtains are present. Once asleep, she will toss and turn. She findings it difficulty to find a position of comfort which she attributes to her breast side. She is unable to lay on her back or chest. If she lays on her back, she feels smothered. She uses 3 pillows to place around her and to aid her sleeping on her side. She denies dream recall. She denies any specific pain. She has a history of migraine headaches but they do not interfer with her sleep. She typically does not need to get up to use the restroom. Depending on her work schedule she will get up from [phone]. She awakens to an alarm clock on her phone. She will immediately grab her phone and start scrolling through messages for about 5 minutes. She typically does not hit the snooze button. She never requires someone prompting her to get out of bed. When she first gets out of bed she feels tired. Reports  waking up with a morning headache about 3 days/week. Reports wakening with a dry mouth 4 times weekly. Denies symptoms suggestive of cataplexy- she states with laughter she finds it hard to breath. In chart review, her prior pediatric neurologist reported some questionable leg weakness with laughter. When I asked the patient, she reported she vaguely remember some issues with laughing years ago but she could not recall the specifics and that event has not re-occurred. Denies sleep paralysis    Denies current or past hypnagogic and/or hypnopompic hallucinations. She denies ever hearing voices or sounds.      Denies sleep talking/ walking, or other parasomnia behaviors     Family Sleep History:  - Maternal aunte- ELLIS- CPAP      Stop Bang 12/18/2018 10/10/2018   Does the patient snore loudly (louder than talking or loud enough to be heard through closed doors)? 0 0   Does the patient often feel tired, fatigued, or sleepy during the daytime, even after a \"good\" night's sleep? 1 1   Has anyone ever observed the patient stop breathing during their sleep? 1 1   Does the patient have or are they being treated for high blood pressure? 0 0   Is the patient's BMI greater than 35? 1 1   Is your neck circumference greater than 17 inches (Male) or 16 inches (Female)? 0 1   Is the patient older than 50? 0 0   Is the patient male? 0 0   ELLIS Score 3 4       3 most recent PHQ Screens 6/19/2019   Little interest or pleasure in doing things Not at all   Feeling down, depressed, irritable, or hopeless Not at all   Total Score PHQ 2 0       Kosse Scale 6/19/2019 2/1/2019 12/18/2018 10/10/2018   Sitting and Reading 1 1 3 2   Watching TV 2 3 0 3   Sitting, inactive in a public place (e.g. a movie theater or meeting) 2 1 0 2   As a passenger in a car for an hour, without a break 1 3 3 3   Lying down to rest in the afternoon, when circumstances permit 1 2 0 3   Sitting and talking to someone 2 2 0 1   Sitting quietly after lunch without alcohol 1 2 3 0   In a car, while stopped for a few minutes in traffic 1 1 3 3   Kosse Sleepiness Score 11 15 12 17                  Past Medical History:  Past Medical History:   Diagnosis Date    Asthma     Bronchiolitis     Epilepsy ([facility] [address]     Narcolepsy     Pneumonia        Past Surgical History:  Past Surgical History:   Procedure Laterality Date    HX LOBECTOMY Right     HX LOBECTOMY      right upper lung       Family History:  Family History   Problem Relation Age of Onset    Hypertension Mother     Diabetes Father     Diabetes Maternal Grandfather     Diabetes Paternal Grandmother        Social History:  Social History     Tobacco Use    Smoking status: Never Smoker    Smokeless tobacco: Never Used   Substance Use Topics    Alcohol use:  Yes Comment: ocassionally    Drug use: No        Caffeine Amount Time of last Intake Comments   Coffee Occasional     Soda Occasional     Tea None     Energy Drinks None     Over- the - counter stimulant pills None     Other Substances      Alcohol Occassional  wine   Tobacco None     Drugs Denies         Medications:  Current Outpatient Medications on File Prior to Visit   Medication Sig Dispense Refill    montelukast (SINGULAIR) 10 mg tablet Take 1 Tab by mouth daily. 30 Tab 6    mometasone-formoterol (DULERA) 100-5 mcg/actuation HFA inhaler Take 2 Puffs by inhalation two (2) times a day. 1 Inhaler 3    etonogestrel (NEXPLANON SDRM) by SubDERmal route.  levalbuterol tartrate (XOPENEX HFA) 45 mcg/actuation inhaler Take 2 Puffs by inhalation every four (4) hours as needed for Wheezing.  fluticasone (CHILDREN'S FLONASE ALLERGY RLF) 50 mcg/actuation nasal spray 2 Sprays by Both Nostrils route daily.  EPINEPHrine (EPIPEN) 0.3 mg/0.3 mL injection 0.3 mg by IntraMUSCular route once as needed.  albuterol (PROVENTIL HFA, VENTOLIN HFA, PROAIR HFA) 90 mcg/actuation inhaler Take 2 Puffs by inhalation every four (4) hours as needed for Wheezing. 1 Inhaler 0    omalizumab (XOLAIR) 150 mg solr by SubCUTAneous route once. No current facility-administered medications on file prior to visit.          Allergy:  Allergies   Allergen Reactions    Bee Venom Protein (Honey Bee) Sneezing    Pollen Extracts Shortness of Breath    Shellfish Derived Swelling    Shellfish Derived Shortness of Breath and Swelling    Soy Shortness of Breath and Swelling    Tomato Swelling       Review of Systems  General ROS: positive for  - fatigue, sleep disturbance and weight gain  negative for - chills, fever, hot flashes, malaise, night sweats or weight loss  ENT ROS: negative for - epistaxis, hearing change, nasal congestion, nasal discharge, nasal polyps, oral lesions, sinus pain, sneezing, sore throat, tinnitus or vertigo  Hematological and Lymphatic ROS: negative for - bleeding problems, blood clots, bruising, jaundice, pallor or swollen lymph nodes  Endocrine ROS: negative for - polydipsia/polyuria, skin changes, temperature intolerance or unexpected weight changes  Respiratory ROS: positive for - shortness of breath and feeling smothered by the weight of her breast- stable  negative for - cough, hemoptysis, orthopnea, pleuritic pain, sputum changes, stridor, tachypnea or wheezing  Cardiovascular ROS: positive for - chest pain and dyspnea on exertion- stable and persistent  negative for - edema, irregular heartbeat, loss of consciousness, murmur, orthopnea, palpitations, paroxysmal nocturnal dyspnea, rapid heart rate or shortness of breath  Gastrointestinal ROS: no abdominal pain, change in bowel habits, or black or bloody stools  Genito-Urinary ROS: no dysuria, trouble voiding, or hematuria  Musculoskeletal ROS: negative  Neurological ROS: no TIA or stroke symptoms  Dermatological ROS: negative for - pruritus, rash or skin lesion changes   Psychological ROS: as kemar   Otherwise negative. Physical Exam:  Blood pressure 118/64, pulse 73, temperature 98.2 °F (36.8 °C), temperature source Oral, resp. rate 18, height 5' 2\" (1.575 m), weight 112.6 kg (248 lb 3.2 oz), SpO2 99 %. on RA, Body mass index is 45.4 kg/m².      General: in no respiratory distress, acyanotic, appears stated age, cooperative, pleasant, morbidly obese body habitus  HEENT: PERRL, EOMI, throat without erythema or exudate, Tongue- dental indention on tongue, Mallampati's score 2+, Uvula- midline  Neck: Supple,  no abnormally enlarged lymph nodes, thyroid is not enlarged, non-tender, No JVD  Chest: + breast hypertrophy, well healed scar right lateral chest wall  Lungs: moderate air entry, clear to auscultation bilaterally,   Heart: Regular rate and rhythm, S1S2 present, without murmur  Abdomen: Obese, bowel sounds normoactive, abdomen is soft without significant tenderness, or guarding  Extremity: negative for edema, cyanosis or clubbing  Skin: Skin color, texture, turgor normal. No rashes or lesions    Data Reviewed:  CBC:   Lab Results   Component Value Date/Time    WBC 8.9 10/10/2018 10:14 PM    HGB 12.3 10/10/2018 10:14 PM    HCT 39.4 10/10/2018 10:14 PM    PLATELET 760 93/03/4525 10:14 PM    MCV 79.8 10/10/2018 10:14 PM       BMP:   Lab Results   Component Value Date/Time    Sodium 140 10/10/2018 10:14 PM    Potassium 4.2 10/10/2018 10:14 PM    Chloride 107 10/10/2018 10:14 PM    CO2 31 10/10/2018 10:14 PM    Anion gap 2 (L) 10/10/2018 10:14 PM    Glucose 78 10/10/2018 10:14 PM    BUN 12 10/10/2018 10:14 PM    Creatinine 0.83 10/10/2018 10:14 PM    BUN/Creatinine ratio 14 10/10/2018 10:14 PM    GFR est AA >60 10/10/2018 10:14 PM    GFR est non-AA >60 10/10/2018 10:14 PM    Calcium 8.5 10/10/2018 10:14 PM        TSH:  No results found for: TSH, TSH2, TSH3, TSHP, TSHELE, TSHEXT, TSHEXT    Imaging:  [x]I have personally reviewed the patients radiographs section   Results from [place] encounter on 10/10/18   XR CHEST PORT    Narrative EXAM: Chest Radiograph    INDICATION: Left-sided chest pain    TECHNIQUE: AP view of the chest    COMPARISON: 8/6/2018, 12/1/2016, 6/30/2016 and 6/15/2009    FINDINGS: No pneumothorax identified. The lungs are clear. No infiltrates  appreciated. No effusions identified. The cardiomediastinal silhouette is  unremarkable. The pulmonary vasculature is unremarkable. The osseous  structures are unremarkable. Impression Impression:  1. No acute cardiopulmonary process. No results found for this or any previous visit. Cardiac Echo:     No results found for this or any previous visit. Historical Sleep Testing Data:  - PSG- [facility]: 9/28/13: AHI: 1.8, REM 4.8, mild snoring, desat index 0.2, Sleep efficiency 82% Sleep latency 46 min.   REM sleep latency 145.5 min  - PSG- [facility]: 8/19/14- times slept: 5.5 hours. AHI:0, REM 9%  Sleep Efficiency: 69%  - MSLT- [facility]: 14: MSL: 13.80 minutes, No SOREMs ( Medications- Dulera, naproxen, Singulair, Amitriptyline, Qvar)    - [facility]: Sleep Efficiency 36%: Sleep Time 2.8 hours.   Sleep latency: 101.5  AHI:0  SpO2 while sleep >94%      Cesar Dyer DO, FCCP  Pulmonary, Sleep and Critical Care Medicine

## 2019-09-13 ENCOUNTER — APPOINTMENT (OUTPATIENT)
Dept: GENERAL RADIOLOGY | Age: 24
End: 2019-09-13
Attending: EMERGENCY MEDICINE
Payer: COMMERCIAL

## 2019-09-13 ENCOUNTER — HOSPITAL ENCOUNTER (EMERGENCY)
Age: 24
Discharge: HOME | End: 2019-09-13
Attending: EMERGENCY MEDICINE
Payer: COMMERCIAL

## 2019-09-13 VITALS
WEIGHT: 250 LBS | OXYGEN SATURATION: 98 % | DIASTOLIC BLOOD PRESSURE: 76 MMHG | HEART RATE: 90 BPM | SYSTOLIC BLOOD PRESSURE: 129 MMHG | TEMPERATURE: 98.8 F | RESPIRATION RATE: 18 BRPM | BODY MASS INDEX: 49.08 KG/M2 | HEIGHT: 60 IN

## 2019-09-13 DIAGNOSIS — S90.02XA CONTUSION OF LEFT ANKLE, INITIAL ENCOUNTER: ICD-10-CM

## 2019-09-13 DIAGNOSIS — S80.02XA CONTUSION OF LEFT KNEE, INITIAL ENCOUNTER: Primary | ICD-10-CM

## 2019-09-13 PROCEDURE — 73610 X-RAY EXAM OF ANKLE: CPT

## 2019-09-13 PROCEDURE — 99282 EMERGENCY DEPT VISIT SF MDM: CPT

## 2019-09-13 PROCEDURE — 73564 X-RAY EXAM KNEE 4 OR MORE: CPT

## 2019-09-13 NOTE — LETTER
[address] [place] [address] [facility] 
[address] [facility] [id number] [phone] Work/School Note Date: 9/13/2019 To Whom It May concern: 
 
Madonna Arango was seen and treated today in the emergency room by the following provider(s): 
Attending Provider: Gita Ramirez MD 
Physician Assistant: JULIO Ann. Madonna Arango may return to work on 9/16/19. Sincerely, JULIO Lopez

## 2019-09-13 NOTE — DISCHARGE INSTRUCTIONS
Patient Education        Contusion: Care Instructions  Your Care Instructions    Contusion is the medical term for a bruise. It is the result of a direct blow or an impact, such as a fall. Contusions are common sports injuries. Most people think of a bruise as a black-and-blue spot. This happens when small blood vessels get torn and leak blood under the skin. But bones, muscles, and organs can also get bruised. This may damage deep tissues but not cause a bruise you can see. The doctor will do a physical exam to find the location of your contusion. You may also have tests to make sure you do not have a more serious injury, such as a broken bone or nerve damage. These may include X-rays or other imaging tests like a CT scan or MRI. Deep-tissue contusions may cause pain and swelling. But if there is no serious damage, they will often get better in a few weeks with home treatment. The doctor has checked you carefully, but problems can develop later. If you notice any problems or new symptoms, get medical treatment right away. Follow-up care is a key part of your treatment and safety. Be sure to make and go to all appointments, and call your doctor if you are having problems. It's also a good idea to know your test results and keep a list of the medicines you take. How can you care for yourself at home? · Put ice or a cold pack on the sore area for 10 to 20 minutes at a time to stop swelling. Put a thin cloth between the ice pack and your skin. · Be safe with medicines. Read and follow all instructions on the label. ? If the doctor gave you a prescription medicine for pain, take it as prescribed. ? If you are not taking a prescription pain medicine, ask your doctor if you can take an over-the-counter medicine. · If you can, prop up the sore area on pillows as much as possible for the next few days. Try to keep the sore area above the level of your heart. When should you call for help?   Call your doctor now or seek immediate medical care if:    · Your pain gets worse.     · You have new or worse swelling.     · You have tingling, weakness, or numbness in the area near the contusion.     · The area near the contusion is cold or pale.    Watch closely for changes in your health, and be sure to contact your doctor if:    · You do not get better as expected. Where can you learn more? Go to http://chiquis-tran.info/. Enter V788 in the search box to learn more about \"Contusion: Care Instructions. \"  Current as of: September 23, 2018  Content Version: 12.1  © 5890-0545 Healthwise, [employer]. Care instructions adapted under license by [employer] (which disclaims liability or warranty for this information). If you have questions about a medical condition or this instruction, always ask your healthcare professional. [address] any warranty or liability for your use of this information.

## 2019-09-13 NOTE — ED PROVIDER NOTES
[address] EMERGENCY DEPT      Date: 9/13/2019  Patient Name: Bre Red    History of Presenting Illness     Chief Complaint   Patient presents with    Knee Pain    Ankle Pain       25 y.o. female with noted past medical history of asthma who presents to the emergency department for atraumatic left knee pain x 3 days. Patient does not recall any injury, but awoke to knee pain on the morning of 9/10. She states, \"My dad came home that night and said he heard me bang the wall (in my sleep),\" and suggests she may have injured it in her sleep. Patient was evaluated for same at [facility] on 9/10 and was prescribed Indomethicin. Patient reports pain now radiating to her left ankle. Pain is achy, rated 7/10, and exacerbated by movement. Patient denies any other associated signs or symptoms. Patient denies any other complaints. Nursing notes regarding the HPI and triage nursing notes were reviewed. Prior medical records were reviewed. Current Outpatient Medications   Medication Sig Dispense Refill    montelukast (SINGULAIR) 10 mg tablet Take 1 Tab by mouth daily. 30 Tab 6    mometasone-formoterol (DULERA) 100-5 mcg/actuation HFA inhaler Take 2 Puffs by inhalation two (2) times a day. 1 Inhaler 3    etonogestrel (NEXPLANON SDRM) by SubDERmal route.  levalbuterol tartrate (XOPENEX HFA) 45 mcg/actuation inhaler Take 2 Puffs by inhalation every four (4) hours as needed for Wheezing.  fluticasone (CHILDREN'S FLONASE ALLERGY RLF) 50 mcg/actuation nasal spray 2 Sprays by Both Nostrils route daily.  EPINEPHrine (EPIPEN) 0.3 mg/0.3 mL injection 0.3 mg by IntraMUSCular route once as needed.  albuterol (PROVENTIL HFA, VENTOLIN HFA, PROAIR HFA) 90 mcg/actuation inhaler Take 2 Puffs by inhalation every four (4) hours as needed for Wheezing. 1 Inhaler 0    omalizumab (XOLAIR) 150 mg solr by SubCUTAneous route once.          Past History     Past Medical History:  Past Medical History:   Diagnosis Date    Asthma     Bronchiolitis     Epilepsy ([facility] [address]     Narcolepsy     Pneumonia        Past Surgical History:  Past Surgical History:   Procedure Laterality Date    HX LOBECTOMY Right     HX LOBECTOMY      right upper lung       Family History:  Family History   Problem Relation Age of Onset    Hypertension Mother     Diabetes Father     Diabetes Maternal Grandfather     Diabetes Paternal Grandmother        Social History:  Social History     Tobacco Use    Smoking status: Never Smoker    Smokeless tobacco: Never Used   Substance Use Topics    Alcohol use: Yes     Comment: ocassionally    Drug use: No       Allergies: Allergies   Allergen Reactions    Bee Venom Protein (Honey Bee) Sneezing    Pollen Extracts Shortness of Breath    Shellfish Derived Swelling    Shellfish Derived Shortness of Breath and Swelling    Soy Shortness of Breath and Swelling    Tomato Swelling       Patient's primary care provider (as noted in EPIC):  Cesario Ram MD    Review of Systems   Constitutional:  Denies malaise, fever, chills. Head:  Denies injury. Face:  Denies injury or pain. ENMT:  Denies sore throat. Neck:  Denies injury or pain. Chest:  Denies injury. Cardiac:  Denies chest pain or palpitations. Respiratory:  Denies cough, wheezing, difficulty breathing, shortness of breath. GI/ABD:  Denies injury, pain, distention, nausea, vomiting, diarrhea. :  Denies injury, pain, dysuria or urgency. Back:  Denies injury or pain. Pelvis:  Denies injury or pain. Extremity/MS: +arthralgia (left knee; left ankle). Denies injury or pain. Neuro:  Denies headache, LOC, dizziness, neurologic symptoms/deficits/paresthesias. Skin: Denies injury, rash, itching or skin changes. All other systems negative as reviewed.      Visit Vitals  /76 (BP 1 Location: Left arm, BP Patient Position: At rest)   Pulse 90   Temp 98.8 °F (37.1 °C)   Resp 18   Ht 5' (1.524 m)   Wt 113.4 kg (250 lb)   SpO2 98%   BMI 48.82 kg/m²       PHYSICAL EXAM:    CONSTITUTIONAL:  Alert, in no apparent distress;  well developed;  well nourished. HEAD:  Normocephalic, atraumatic. EYES:  EOMI. Non-icteric sclera. Normal conjunctiva. ENTM:  Mouth: mucous membranes moist.  NECK:  Supple  RESPIRATORY:  Chest clear, equal breath sounds, good air movement. CARDIOVASCULAR:  Regular rate and rhythm. No murmurs, rubs, or gallops. GI:  Normal bowel sounds, abdomen soft and non-tender. No rebound or guarding. BACK:  Non-tender. UPPER EXT:  Normal inspection. LOWER EXT:  Minimal subjective TTP to left-lateral knee. No edema, no calf tenderness. Distal pulses intact. 5/5 strength throughout. NEURO:  Moves all four extremities, and grossly normal motor exam.  SKIN:  No rashes;  Normal for age. PSYCH:  Alert and normal affect. DIFFERENTIAL DIAGNOSES/ MEDICAL DECISION MAKING:  Contusion, sprain, dislocation, fracture, ligamentous tear/ disruption or a combination of the above. Xr Knee Lt Min 4 V    Result Date: 9/13/2019  EXAMINATION: Left knee 4 views Left ankle 3 views INDICATION: Left knee injury, ankle pain COMPARISON: None FINDINGS: Left knee: 4 views obtained. No acute fracture or subluxation identified. Tiny marginal osteophytes. No significant joint effusion. Probable small bone island in the medial femoral condyle. Left ankle: 3 views obtained. No acute fracture or subluxation identified. Ankle mortise preserved and talar dome smooth. Perhaps mild soft tissue swelling. IMPRESSION: Left knee: No acute radiographic findings. Tiny marginal osteophytes. Left ankle: No acute radiographic findings. Perhaps mild soft tissue swelling. Xr Ankle Lt Min 3 V    Result Date: 9/13/2019  EXAMINATION: Left knee 4 views Left ankle 3 views INDICATION: Left knee injury, ankle pain COMPARISON: None FINDINGS: Left knee: 4 views obtained. No acute fracture or subluxation identified.  Tiny marginal osteophytes. No significant joint effusion. Probable small bone island in the medial femoral condyle. Left ankle: 3 views obtained. No acute fracture or subluxation identified. Ankle mortise preserved and talar dome smooth. Perhaps mild soft tissue swelling. IMPRESSION: Left knee: No acute radiographic findings. Tiny marginal osteophytes. Left ankle: No acute radiographic findings. Perhaps mild soft tissue swelling. IMPRESSION AND MEDICAL DECISION MAKING:  Based upon the patients presentation with noted HPI and PE, along with the work up done in the emergency department, I believe that the patient is having noted contusions from injury. Pt to take 400mg ibuprofen with 325mg or 500mg Tylenol every 6 hours. Given work note. She will d/c indocin as she states it upsets her stomach. Diagnosis:   1. Contusion of left knee, initial encounter    2. Contusion of left ankle, initial encounter      Disposition: Discharge    Follow-up Information     Follow up With Specialties Details Why [facility]. Orthopedic Surgery In 1 week As needed [address], [address] [place] [address]  [phone] [address] [facility] EMERGENCY DEPT Emergency Medicine  If symptoms worsen [facility]  [phone]          Discharge Medication List as of 9/13/2019  2:29 PM      CONTINUE these medications which have NOT CHANGED    Details   montelukast (SINGULAIR) 10 mg tablet Take 1 Tab by mouth daily. , Normal, Disp-30 Tab, R-6      mometasone-formoterol (DULERA) 100-5 mcg/actuation HFA inhaler Take 2 Puffs by inhalation two (2) times a day., Normal, Disp-1 Inhaler, R-3      etonogestrel (NEXPLANON SDRM) by SubDERmal route., Historical Med      levalbuterol tartrate (XOPENEX HFA) 45 mcg/actuation inhaler Take 2 Puffs by inhalation every four (4) hours as needed for Wheezing., Historical Med      fluticasone (CHILDREN'S FLONASE ALLERGY RLF) 50 mcg/actuation nasal spray 2 Sprays by Both Nostrils route daily. , Historical Med      EPINEPHrine (EPIPEN) 0.3 mg/0.3 mL injection 0.3 mg by IntraMUSCular route once as needed., Historical Med      albuterol (PROVENTIL HFA, VENTOLIN HFA, PROAIR HFA) 90 mcg/actuation inhaler Take 2 Puffs by inhalation every four (4) hours as needed for Wheezing., Print, Disp-1 Inhaler, R-0      omalizumab (XOLAIR) 150 mg solr by SubCUTAneous route once., Historical Med           JULIO Velasquez

## 2019-09-13 NOTE — ED NOTES
Teja Sol is a 25 y.o. female that was discharged in good condition. The patients diagnosis, condition and treatment were explained to  patient and aftercare instructions were given. The patient verbalized understanding. Patient armband removed and shredded.

## 2019-09-13 NOTE — ED TRIAGE NOTES
Patient states injury to left knee on  September 10, 2019 during her sleep. She states that she accidentally struck the wall near her bed with her left knee. C/o continuing pain to left knee and ankle with difficulty bearing weight on affected extremity. Patient denies ankle pain at time of initial injury. States ankle began hurting after knee.

## 2019-09-17 ENCOUNTER — OFFICE VISIT (OUTPATIENT)
Dept: ORTHOPEDIC SURGERY | Age: 24
End: 2019-09-17

## 2019-09-17 VITALS
SYSTOLIC BLOOD PRESSURE: 139 MMHG | RESPIRATION RATE: 16 BRPM | WEIGHT: 252.4 LBS | HEART RATE: 84 BPM | HEIGHT: 60 IN | DIASTOLIC BLOOD PRESSURE: 73 MMHG | TEMPERATURE: 98.1 F | OXYGEN SATURATION: 98 % | BODY MASS INDEX: 49.55 KG/M2

## 2019-09-17 DIAGNOSIS — E66.01 CLASS 3 SEVERE OBESITY WITH BODY MASS INDEX (BMI) OF 45.0 TO 49.9 IN ADULT, UNSPECIFIED OBESITY TYPE, UNSPECIFIED WHETHER SERIOUS COMORBIDITY PRESENT (HCC): ICD-10-CM

## 2019-09-17 DIAGNOSIS — M17.12 OSTEOARTHRITIS OF LEFT KNEE, UNSPECIFIED OSTEOARTHRITIS TYPE: Primary | ICD-10-CM

## 2019-09-17 DIAGNOSIS — S80.02XA CONTUSION OF LEFT KNEE, INITIAL ENCOUNTER: ICD-10-CM

## 2019-09-17 DIAGNOSIS — M25.562 ACUTE PAIN OF LEFT KNEE: ICD-10-CM

## 2019-09-17 DIAGNOSIS — S86.912A STRAIN OF LEFT KNEE, INITIAL ENCOUNTER: ICD-10-CM

## 2019-09-17 RX ORDER — FAMOTIDINE 40 MG/1
40 TABLET, FILM COATED ORAL DAILY
Qty: 30 TAB | Refills: 1 | Status: SHIPPED | OUTPATIENT
Start: 2019-09-17 | End: 2020-06-09

## 2019-09-17 RX ORDER — MELOXICAM 15 MG/1
15 TABLET ORAL DAILY
Qty: 30 TAB | Refills: 1 | Status: SHIPPED | OUTPATIENT
Start: 2019-09-17 | End: 2020-06-09

## 2019-09-17 NOTE — PATIENT INSTRUCTIONS
Continue activity modification  Order provided for PT and knee brace  Follow RICE protocol  Rx provided for Mobic and Pepcid, please take as directed  Follow up in 3 weeks         Knee Pain or Injury: Care Instructions  Your Care Instructions    Injuries are a common cause of knee problems. Sudden (acute) injuries may be caused by a direct blow to the knee. They can also be caused by abnormal twisting, bending, or falling on the knee. Pain, bruising, or swelling may be severe, and may start within minutes of the injury. Overuse is another cause of knee pain. Other causes are climbing stairs, kneeling, and other activities that use the knee. Everyday wear and tear, especially as you get older, also can cause knee pain. Rest, along with home treatment, often relieves pain and allows your knee to heal. If you have a serious knee injury, you may need tests and treatment. Follow-up care is a key part of your treatment and safety. Be sure to make and go to all appointments, and call your doctor if you are having problems. It's also a good idea to know your test results and keep a list of the medicines you take. How can you care for yourself at home? · Be safe with medicines. Read and follow all instructions on the label. ? If the doctor gave you a prescription medicine for pain, take it as prescribed. ? If you are not taking a prescription pain medicine, ask your doctor if you can take an over-the-counter medicine. · Rest and protect your knee. Take a break from any activity that may cause pain. · Put ice or a cold pack on your knee for 10 to 20 minutes at a time. Put a thin cloth between the ice and your skin. · Prop up a sore knee on a pillow when you ice it or anytime you sit or lie down for the next 3 days. Try to keep it above the level of your heart. This will help reduce swelling. · If your knee is not swollen, you can put moist heat, a heating pad, or a warm cloth on your knee.   · If your doctor recommends an elastic bandage, sleeve, or other type of support for your knee, wear it as directed. · Follow your doctor's instructions about how much weight you can put on your leg. Use a cane, crutches, or a walker as instructed. · Follow your doctor's instructions about activity during your healing process. If you can do mild exercise, slowly increase your activity. · Reach and stay at a healthy weight. Extra weight can strain the joints, especially the knees and hips, and make the pain worse. Losing even a few pounds may help. When should you call for help? Call 911 anytime you think you may need emergency care. For example, call if:    · You have symptoms of a blood clot in your lung (called a pulmonary embolism). These may include:  ? Sudden chest pain. ? Trouble breathing. ? Coughing up blood.    Call your doctor now or seek immediate medical care if:    · You have severe or increasing pain.     · Your leg or foot turns cold or changes color.     · You cannot stand or put weight on your knee.     · Your knee looks twisted or bent out of shape.     · You cannot move your knee.     · You have signs of infection, such as:  ? Increased pain, swelling, warmth, or redness. ? Red streaks leading from the knee. ? Pus draining from a place on your knee. ? A fever.     · You have signs of a blood clot in your leg (called a deep vein thrombosis), such as:  ? Pain in your calf, back of the knee, thigh, or groin. ? Redness and swelling in your leg or groin.    Watch closely for changes in your health, and be sure to contact your doctor if:    · You have tingling, weakness, or numbness in your knee.     · You have any new symptoms, such as swelling.     · You have bruises from a knee injury that last longer than 2 weeks.     · You do not get better as expected. Where can you learn more? Go to http://chiquis-tran.info/.   Enter K195 in the search box to learn more about \"Knee Pain or Injury: Care Instructions. \"  Current as of: September 23, 2018  Content Version: 12.1  © 3920-6578 [employer]. Care instructions adapted under license by [employer] (which disclaims liability or warranty for this information). If you have questions about a medical condition or this instruction, always ask your healthcare professional. [place] [address] any warranty or liability for your use of this information.

## 2019-09-17 NOTE — PROGRESS NOTES
OFFICE NOTE     Patient: Tasneem Campos                MRN: [id number]       SSN: [id number]  YOB: 1995             AGE: 25 y.o. SEX: female    Steff Briseno MD    Chief Complaint:   Chief Complaint   Patient presents with    Ankle Pain     left ankle pain    Knee Pain     left knee pain         DOI: 9/11/19    HPI:     Tasneem Campos is a 25 y.o. female who is seen for left knee pain that has been present for the past 6 days. Patient reports that she woke up on Wednesday morning with knee pain. She thinks that she may have struck her knee on the wall while sleeping. Patient reports being treated at [facility]. No x-rays were performed and she was given an NSAID. When she continued to have knee pain, she went to [facility] ED on 9/13. X-rays of the were completed which revealed no fracture, dislocation or subluxation. She was instructed to ice and follow up with our office. The images were individually visualized, reviewed and discussed with the patient in the office today. Patient has continued to have worsening pain and swelling to the left knee which is described as constant, achy, throbbing. There is no radiation of pain. Pain is localized to the anteromedial and posterior aspect of the left knee. Patient states that the current level of pain is 7/10 which is aggravated by attempted WB, certain movement/activity which increases pain to 10/10 and pain is alleviated by rest and ice. Patient has self treated with NSAIDs. Patient reports difficulty with daily activity. Associated symptoms include cracking and mild swelling. Tasneem Campos PMH significant for Asthma, Bronchitis, Epilepsy. Her is Body mass index is 49.29 kg/m². which complicates treatment of her left knee pain. Pain Assessment  9/17/2019   Location of Pain Ankle;Knee   Location Modifiers Left   Severity of Pain 7   Quality of Pain Aching;Dull; [place] Jennifer; Throbbing;Burning   Frequency of Pain Constant Aggravating Factors Walking;Exercise   Relieving Factors Ice;Heat;Elevation   Result of Injury No        Pain Location: Knee      REVIEW OF SYSTEMS:     Review of Systems: Chest pain: no  Shortness of breath: no  Fever: no  Night sweats: no  Weight loss: no  Constitutional signs: no. Negative for fever, chills and unexpected weight loss. The patient complains of left knee pain otherwise, the remainder of the review of systems is negative except as mentioned in the HPI. PHYSICAL EXAM:       Visit Vitals  /73   Pulse 84   Temp 98.1 °F (36.7 °C) (Oral)   Resp 16   Ht 5' (1.524 m)   Wt 252 lb 6.4 oz (114.5 kg)   SpO2 98%   BMI 49.29 kg/m²       Pain Scale: 7/10      Blood pressure is: WNL on examination today. Appearance: Abhay Lewis is an alert, well appearing, 25 y.o. female who is oriented to person, place/time, and who follows commands. Seated comfortably in no acute distress. Speech normal in context and clarity. Current BMI is: Body mass index is 49.29 kg/m². [employer] [employer] Patient arrives to office via: with assistive device: none   Patient accompanied in the examination room  by: self    PSYCHIATRIC: Affect, mood, judgement, behavior and conduct are appropriate. Memory grossly intact, no dementia    LYMPHATIC: lymph nodes are not enlarged and are within normal limits  SKIN: normal in color and non tender to palpation. There are no bruises or abrasions noted. NEUROVASCULAR: Motor sensory grossly intact, exam is within normal limits. No sensory deficits noted. Reflexes are equal bilaterally. There is normal sensation to pinprick and light touch. No significant edema or embolic phenomena to foot/toes, hand/fingers. Extremities are warm and appear well perfused. Positive distal pulses and capillary refill. No varicosities noted. No apparent venous status noted. No pitting edema noted.     MOTOR/STRENGTH/TONE: strength WNL in tested muscle groups no fasciculations or no involuntary movements noted     MUSCULOSKELETAL:       EXTREMITIES: EXAMINATION OF: left knee  Examination    Gait  Antalgic    Skin Intact   Edema +   Effusion -   Erythema -   Ecchymosis -   Warmth -   Joint line tenderness + medial joint   Varus/Valgus testing (MCL/LCL) -   Lachman's  -   Ene's test -   Anterior drawer -   Posterior drawer -   Valgus deformity -   Apprehension -   Price test -   Straight leg raise -   ROM 90°/0   Crepitus +   Gross Instability -   Calf pain -   Neurovascular Intact         RADIOGRAPHS & DIAGNOSTIC STUDIES     XR Results (most recent):  Results from Hospital Encounter encounter on 09/13/19   XR ANKLE LT MIN 3 V    Narrative EXAMINATION:    Left knee 4 views  Left ankle 3 views    INDICATION: Left knee injury, ankle pain    COMPARISON: None    FINDINGS:    Left knee: 4 views obtained. No acute fracture or subluxation identified. Tiny  marginal osteophytes. No significant joint effusion. Probable small bone island  in the medial femoral condyle. Left ankle: 3 views obtained. No acute fracture or subluxation identified. Ankle  mortise preserved and talar dome smooth. Perhaps mild soft tissue swelling. Impression IMPRESSION:    Left knee:  No acute radiographic findings. Tiny marginal osteophytes. Left ankle:  No acute radiographic findings. Perhaps mild soft tissue swelling. IMPRESSION:     Encounter Diagnoses     ICD-10-CM ICD-9-CM   1. Osteoarthritis of left knee, unspecified osteoarthritis type M17.12 715.96   2. Contusion of left knee, initial encounter S80. 02XA 924.11   3. Acute pain of left knee M25.562 719.46   4. Strain of left knee, initial encounter S86.912A 844.9   5.  Class 3 severe obesity with body mass index (BMI) of 45.0 to 49.9 in adult, unspecified obesity type, unspecified whether serious comorbidity present ([facility] [address] E66.01 278.01    Z68.42 V85.42       PLAN:         Orders Placed This Encounter    Generic Supply Order    [facility] (MOBIC) 15 mg tablet    famotidine (PEPCID) 40 mg tablet                   Continue activity modification  Order provided for PT and knee brace  Follow RICE protocol  Rx provided for Mobic and Pepcid, please take as directed  Follow up in 3 weeks   If no improvement with conservative treatment at [facility], MRI of left knee will be ordered              Patient expresses understanding of the diagnosis and treatment plan. Patient education has been provided. Pain Assessment  9/17/2019   Location of Pain Ankle;Knee   Location Modifiers Left   Severity of Pain 7   Quality of Pain Aching;Dull; Cheyenne Ismael; Throbbing;Burning   Frequency of Pain Constant   Aggravating Factors Walking;Exercise   Relieving Factors Ice;Heat;Elevation   Result of Injury No        Pain Location: Knee     PAST MEDICAL HISTORY:       Past Medical History:   Diagnosis Date    Asthma     Bronchiolitis     Epilepsy ([address]     Narcolepsy     Pneumonia           There are no active hospital problems to display for this patient. PAST SURGICAL HISTORY     Past Surgical History:   Procedure Laterality Date    HX LOBECTOMY Right     HX LOBECTOMY      right upper lung         ALLERGIES:     Allergies   Allergen Reactions    Bee Venom Protein (Honey Bee) Sneezing    Pollen Extracts Shortness of Breath    Shellfish Derived Swelling    Shellfish Derived Shortness of Breath and Swelling    Soy Shortness of Breath and Swelling    Tomato Swelling         MEDICATIONS:     Current Outpatient Medications   Medication Sig    meloxicam (MOBIC) 15 mg tablet Take 1 Tab by mouth daily.  famotidine (PEPCID) 40 mg tablet Take 1 Tab by mouth daily.  montelukast (SINGULAIR) 10 mg tablet Take 1 Tab by mouth daily.  mometasone-formoterol (DULERA) 100-5 mcg/actuation HFA inhaler Take 2 Puffs by inhalation two (2) times a day.  etonogestrel (Charlyne Amble) by SubDERmal route.     levalbuterol tartrate (XOPENEX HFA) 45 mcg/actuation inhaler Take 2 Puffs by inhalation every four (4) hours as needed for Wheezing.  fluticasone (CHILDREN'S FLONASE ALLERGY RLF) 50 mcg/actuation nasal spray 2 Sprays by Both Nostrils route daily.  EPINEPHrine (EPIPEN) 0.3 mg/0.3 mL injection 0.3 mg by IntraMUSCular route once as needed.  albuterol (PROVENTIL HFA, VENTOLIN HFA, PROAIR HFA) 90 mcg/actuation inhaler Take 2 Puffs by inhalation every four (4) hours as needed for Wheezing.  omalizumab (XOLAIR) 150 mg solr by SubCUTAneous route once. No current facility-administered medications for this visit.           SOCIAL HISTORY:     Social History     Occupational History    Occupation: [employer]   Tobacco Use    Smoking status: Never Smoker    Smokeless tobacco: Never Used   Substance and Sexual Activity    Alcohol use: Yes     Comment: ocassionally    Drug use: No    Sexual activity: Never     Birth control/protection: Implant          FAMILY HISTORY:     Family History   Problem Relation Age of Onset    Hypertension Mother     Diabetes Father     Diabetes Maternal Grandfather     Diabetes Paternal Grandmother           Social History     Social History Narrative    ** Merged History Encounter **             Social History     Socioeconomic History    Marital status: SINGLE     Spouse name: Not on file    Number of children: Not on file    Years of education: Not on file    Highest education level: Not on file   Occupational History    Occupation: [employer]   Social Needs    Financial resource strain: Not on file    Food insecurity:     Worry: Not on file     Inability: Not on file    Transportation needs:     Medical: Not on file     Non-medical: Not on file   Tobacco Use    Smoking status: Never Smoker    Smokeless tobacco: Never Used   Substance and Sexual Activity    Alcohol use: Yes     Comment: ocassionally    Drug use: No    Sexual activity: Never     Birth control/protection: Implant   Lifestyle    Physical activity:     Days per week: Not on file Minutes per session: Not on file    Stress: Not on file   Relationships    Social connections:     Talks on phone: Not on file     Gets together: Not on file     Attends Bahai service: Not on file     Active member of club or organization: Not on file     Attends meetings of clubs or organizations: Not on file     Relationship status: Not on file    Intimate partner violence:     Fear of current or ex partner: Not on file     Emotionally abused: Not on file     Physically abused: Not on file     Forced sexual activity: Not on file   Other Topics Concern     Service Not Asked    Blood Transfusions Not Asked    Caffeine Concern Yes     Comment: 1 cup a month    Occupational Exposure Not Asked   [place] Hazards Not Asked    Sleep Concern Not Asked    Stress Concern Not Asked    Weight Concern Not Asked    Special Diet Not Asked    Back Care Not Asked    Exercise Yes     Comment: walks 5 minutes 5 times a day    Bike Helmet Not Asked    Seat Belt Yes    Self-Exams Not Asked   Social History Narrative    ** Merged History Encounter **             Amelia Robin PA-C  9/17/2019

## 2019-09-17 NOTE — PROGRESS NOTES
1. Have you been to the ER, urgent care clinic since your last visit? Hospitalized since your last visit? Yes When: friday Where: [facility] Reason for visit: knee and ankle pain    2. Have you seen or consulted any other health care providers outside of the [address] [place] [address] since your last visit? Include any pap smears or colon screening.  No

## 2019-09-19 ENCOUNTER — HOSPITAL ENCOUNTER (OUTPATIENT)
Dept: PHYSICAL THERAPY | Age: 24
Discharge: HOME | End: 2019-09-19
Payer: COMMERCIAL

## 2019-09-19 PROCEDURE — 97535 SELF CARE MNGMENT TRAINING: CPT

## 2019-09-19 PROCEDURE — 97110 THERAPEUTIC EXERCISES: CPT

## 2019-09-19 PROCEDURE — 97161 PT EVAL LOW COMPLEX 20 MIN: CPT

## 2019-09-19 NOTE — PROGRESS NOTES
[facility]  [address] [facility] ([place], [place]) [address]  [place], [address]  [phone] fax    Plan of Care/ Statement of Necessity for Physical Therapy Services    Patient name: Mag Stuart Start of Care: 2019   Referral source: [place] Centeno : 1995    Medical Diagnosis: Pain in left knee [M25.562]  Unilateral primary osteoarthritis, left knee [M17.12]  Payor: OPTIMA / [place] / Product Type: HMO /  Onset Date:1 week    Treatment Diagnosis: Left knee pain   Prior Hospitalization: see medical history Provider#: [id number]   Medications: Verified on Patient summary List    Comorbidities: asthma, arthritis, right upper lobectomy   Prior Level of Function: Pt able to ambulate and perform work duties unlimited without knee pain     The Plan of Care and following information is based on the information from the initial evaluation. Assessment/ key information: Pt is a 26 y/o F presenting with c/o left knee pain for the past 8 days. She reports no known mechanism stating that she awoke with pain last Wednesday and has been limping since. She reports NSAID use and new prescription \"for arthritis\" but no improvement with either. Pt did have X- rays taken which were negative aside from mild osteophyte formation. Pt reports constant sharp pain through knee that increases with weight bearing. Pt demonstrates TTP through patella with increased pain during quad setting and knee flexion. Negative joint line tenderness, negative Stevie's. Pt denies any numbness/tingling or instability. Pt signs and symptoms appear consistent with patellofemoral pain. She would benefit from continued PT to improve pain, ROM, strength and functional mobility to return to PLOF.     Evaluation Complexity History MEDIUM  Complexity : 1-2 comorbidities / personal factors will impact the outcome/ POC ; Examination LOW Complexity : 1-2 Standardized tests and measures addressing body structure, function, activity limitation and / or participation in recreation  ;Presentation MEDIUM Complexity : Evolving with changing characteristics  ; Clinical Decision Making MEDIUM Complexity : FOTO score of 26-74  Overall Complexity Rating: LOW   Problem List: pain affecting function, decrease ROM, decrease strength, impaired gait/ balance, decrease ADL/ functional abilitiies, decrease activity tolerance, decrease flexibility/ joint mobility and decrease transfer abilities   Treatment Plan may include any combination of the following: Therapeutic exercise, Therapeutic activities, Neuromuscular re-education, Physical agent/modality, Gait/balance training, Manual therapy, Aquatic therapy, Patient education, Self Care training, Functional mobility training and Stair training  Patient / Family readiness to learn indicated by: trying to perform skills and interest  Persons(s) to be included in education: patient (P)  Barriers to Learning/Limitations: None  Patient Goal (s): Hopefully to walk on my knee again  Patient Self Reported Health Status: fair  Rehabilitation Potential: good    Short Term Goals: To be accomplished in 2 weeks:  1. Pt will demonstrate I and compliance with HEP to maximize therapeutic effect. 2. Pt will improve left knee AROM 0-110 for improved gait and transfer ease. Long Term Goals: To be accomplished in 4 weeks: 1. Pt will demonstrate 15 SLR on left without knee pain to improve quad activation and knee strength in standing. 2. Pt will ambulate throughout clinic without notable antalgia or gait deficits to improve mechanics and proper loading with ADLs. 3. Pt will demonstrate ability to transfer from sit to stand without UE assist 10x for improved functional mobility and mechanics. 4. Pt will improve FOTO score to 58 to demonstrate improved quality of life and function. Frequency / Duration: Patient to be seen 2 times per week for 4 weeks.     Patient/ Caregiver education and instruction: Diagnosis, prognosis, self care, activity modification and exercises   [x]  Plan of care has been reviewed with RENNY Grey DPT, CMTPT 9/19/2019 8:08 AM    ________________________________________________________________________    I certify that the above Therapy Services are being furnished while the patient is under my care. I agree with the treatment plan and certify that this therapy is necessary.     Physician's Signature:____________Date:_________TIME:________    ** Signature, Date and Time must be completed for valid certification **    Please sign and return to [facility] [address] [place] [address] & [facility]  [address] [place] [address], [facility] [address]  [phone] fax

## 2019-09-19 NOTE — PROGRESS NOTES
PT DAILY TREATMENT NOTE     Patient Name: Teja Sol  Date:2019  : 1995  [x]  Patient  Verified  Payor: Georgina Pires / [place] / Product Type: HMO /    In time:7:29  Out time:9:07  Total Treatment Time (min): 38  Visit #: 1 of 8    Treatment Area: Pain in left knee [M25.562]  Unilateral primary osteoarthritis, left knee [M17.12]    SUBJECTIVE  Pain Level (0-10 scale): 7  Any medication changes, allergies to medications, adverse drug reactions, diagnosis change, or new procedure performed?: [x] No    [] Yes (see summary sheet for update)  Subjective functional status/changes:   [] No changes reported  Pt reports constant knee pain that increases with weightbearing    OBJECTIVE    Modality rationale: PD to improve the patients ability to    Min Type Additional Details    [] Estim:  []Unatt       []IFC  []Premod                        []Other:  []w/ice   []w/heat  Position:  Location:    [] Estim: []Att    []TENS instruct  []NMES                    []Other:  []w/US   []w/ice   []w/heat  Position:  Location:    []  Traction: [] Cervical       []Lumbar                       [] Prone          []Supine                       []Intermittent   []Continuous Lbs:  [] before manual  [] after manual    []  Ultrasound: []Continuous   [] Pulsed                           []1MHz   []3MHz W/cm2:  Location:    []  Iontophoresis with dexamethasone         Location: [] Take home patch   [] In clinic    []  Ice     []  heat  []  Ice massage  []  Laser   []  Anodyne Position:  Location:    []  Laser with stim  []  Other:  Position:  Location:    []  Vasopneumatic Device Pressure:       [] lo [] med [] hi   Temperature: [] lo [] med [] hi   [] Skin assessment post-treatment:  []intact []redness- no adverse reaction    []redness - adverse reaction:     18 min [x]Eval                  []Re-Eval       10 min Therapeutic Exercise:  [] See flow sheet :   Rationale: increase ROM and increase strength to improve the patients ability to perform daily tasks and self care    10 min Therapeutic Activity:  []  See flow sheet :   Rationale: pt education and self care instruction  to improve the patients ability to self manage symptoms and maximize therapeutic effect               With   [] TE   [] TA   [] neuro   [] other: Patient Education: [x] Review HEP    [] Progressed/Changed HEP based on:   [] positioning   [] body mechanics   [] transfers   [] heat/ice application    [] other:      Other Objective/Functional Measures:      Pain Level (0-10 scale) post treatment: 7    ASSESSMENT/Changes in Function: see POC    Patient will continue to benefit from skilled PT services to modify and progress therapeutic interventions, address functional mobility deficits, address ROM deficits, address strength deficits, analyze and address soft tissue restrictions, analyze and cue movement patterns and analyze and modify body mechanics/ergonomics to attain remaining goals. [x]  See Plan of Care  []  See progress note/recertification  []  See Discharge Summary         Progress towards goals / Updated goals:  Short Term Goals: To be accomplished in 2 weeks:  1. Pt will demonstrate I and compliance with HEP to maximize therapeutic effect. 2. Pt will improve left knee AROM 0-110 for improved gait and transfer ease. Long Term Goals: To be accomplished in 4 weeks: 1. Pt will demonstrate 15 SLR on left without knee pain to improve quad activation and knee strength in standing. 2. Pt will ambulate throughout clinic without notable antalgia or gait deficits to improve mechanics and proper loading with ADLs. 3. Pt will demonstrate ability to transfer from sit to stand without UE assist 10x for improved functional mobility and mechanics. 4. Pt will improve FOTO score to 58 to demonstrate improved quality of life and function.     PLAN  []  Upgrade activities as tolerated     [x]  Continue plan of care  []  Update interventions per flow sheet []  Discharge due to:_  []  Other:_      Fortunato Tanner DPT, CMTPT 9/19/2019  8:20 AM    Future Appointments   Date Time Provider Angel Luis Navarrete   9/24/2019  8:30 AM [facility]   9/26/2019  4:00 PM Herminia Tsang, PTA [facility]   10/1/2019  2:30 PM Herminia Tsang, PTA [facility]   10/4/2019  4:00 PM Herminia Tsang, PTA [facility]

## 2019-09-23 ENCOUNTER — DOCUMENTATION ONLY (OUTPATIENT)
Dept: ORTHOPEDIC SURGERY | Age: 24
End: 2019-09-23

## 2019-09-24 ENCOUNTER — HOSPITAL ENCOUNTER (OUTPATIENT)
Dept: PHYSICAL THERAPY | Age: 24
Discharge: HOME | End: 2019-09-24
Payer: COMMERCIAL

## 2019-09-24 PROCEDURE — 97110 THERAPEUTIC EXERCISES: CPT

## 2019-09-24 NOTE — PROGRESS NOTES
PT DAILY TREATMENT NOTE 10-18    Patient Name: Tasneem Campos  Date:2019  : 1995  [x]  Patient  Verified  Payor: Suraj Morrison / Plan: [employer] / Product Type: HMO /    In time:8:30  Out time:9:07  Total Treatment Time (min): 37  Visit #: 2 of 8    Treatment Area: Pain in left knee [M25.562]  Unilateral primary osteoarthritis, left knee [M17.12]    SUBJECTIVE  Pain Level (0-10 scale): 6  Any medication changes, allergies to medications, adverse drug reactions, diagnosis change, or new procedure performed?: [x] No    [] Yes (see summary sheet for update)  Subjective functional status/changes:   [] No changes reported  Pt reports she is still feeling a lot of pain in the left knee after sitting for a long time. OBJECTIVE    Modality rationale: decrease pain and increase tissue extensibility to improve the patients ability to perform functional task with ease.    Min Type Additional Details    [] Estim:  []Unatt       []IFC  []Premod                        []Other:  []w/ice   []w/heat  Position:  Location:    [] Estim: []Att    []TENS instruct  []NMES                    []Other:  []w/US   []w/ice   []w/heat  Position:  Location:    []  Traction: [] Cervical       []Lumbar                       [] Prone          []Supine                       []Intermittent   []Continuous Lbs:  [] before manual  [] after manual    []  Ultrasound: []Continuous   [] Pulsed                           []1MHz   []3MHz W/cm2:  Location:    []  Iontophoresis with dexamethasone         Location: [] Take home patch   [] In clinic   5 []  Ice     [x]  heat  []  Ice massage  []  Laser   []  Anodyne Position:long sit  Location:left knee    []  Laser with stim  []  Other:  Position:  Location:    []  Vasopneumatic Device Pressure:       [] lo [] med [] hi   Temperature: [] lo [] med [] hi   [x] Skin assessment post-treatment:  [x]intact []redness- no adverse reaction    []redness - adverse reaction:         32 min Therapeutic Exercise:  [x] See flow sheet :   Rationale: increase ROM and increase strength to improve the patients ability to perform functional task. With   [] TE   [] TA   [] neuro   [] other: Patient Education: [x] Review HEP    [] Progressed/Changed HEP based on:   [] positioning   [] body mechanics   [] transfers   [] heat/ice application    [] other:      Other Objective/Functional Measures:      Pain Level (0-10 scale) post treatment: 6    ASSESSMENT/Changes in Function:   Pt tolerated first f/u visit well displaying good form with therex. Pt had no complaints of increasing pain throughout exercises and reports no change in pain post treatment. Patient will continue to benefit from skilled PT services to modify and progress therapeutic interventions, address functional mobility deficits, address ROM deficits, address strength deficits, analyze and address soft tissue restrictions, analyze and cue movement patterns, analyze and modify body mechanics/ergonomics and assess and modify postural abnormalities to attain remaining goals. [x]  See Plan of Care  []  See progress note/recertification  []  See Discharge Summary         Progress towards goals / Updated goals:  Short Term Goals: To be accomplished in 2 weeks:  1. Pt will demonstrate I and compliance with HEP to maximize therapeutic effect. Progressing 9/24/19- Pt reports initiation  2. Pt will improve left knee AROM 0-110 for improved gait and transfer ease. Long Term Goals: To be accomplished in 4 weeks: 1. Pt will demonstrate 15 SLR on left without knee pain to improve quad activation and knee strength in standing. 2. Pt will ambulate throughout clinic without notable antalgia or gait deficits to improve mechanics and proper loading with ADLs. 3. Pt will demonstrate ability to transfer from sit to stand without UE assist 10x for improved functional mobility and mechanics.   4. Pt will improve FOTO score to 58 to demonstrate improved quality of life and function.     PLAN  []  Upgrade activities as tolerated     [x]  Continue plan of care  []  Update interventions per flow sheet       []  Discharge due to:_  []  Other:_      Timmy Boyer PTA 9/24/2019  7:55 AM    Future Appointments   Date Time Provider Angel Luis Navarrete   9/24/2019  8:30 AM [facility]   9/26/2019  4:00 PM [facility]   10/1/2019  2:30 PM Swans[facility]io [facility]jedaoast[facility]   10/4/2019  4:00 PM Sw[facility] Pancoast[facility]

## 2019-09-26 ENCOUNTER — HOSPITAL ENCOUNTER (OUTPATIENT)
Dept: PHYSICAL THERAPY | Age: 24
Discharge: HOME | End: 2019-09-26
Payer: COMMERCIAL

## 2019-09-26 PROCEDURE — 97140 MANUAL THERAPY 1/> REGIONS: CPT

## 2019-09-26 PROCEDURE — 97110 THERAPEUTIC EXERCISES: CPT

## 2019-09-26 NOTE — PROGRESS NOTES
PT DAILY TREATMENT NOTE 10-18    Patient Name: Tony Correa  Date:2019  : 1995  [x]  Patient  Verified  Payor: Alan Fan / Plan: [employer] HMO / Product Type: HMO /    In time:4:00  Out time:4:48  Total Treatment Time (min): 48  Visit #: 3 of 8    Treatment Area: Pain in left knee [M25.562]  Unilateral primary osteoarthritis, left knee [M17.12]    SUBJECTIVE  Pain Level (0-10 scale): 8  Any medication changes, allergies to medications, adverse drug reactions, diagnosis change, or new procedure performed?: [x] No    [] Yes (see summary sheet for update)  Subjective functional status/changes:   [] No changes reported  Pt reports she was standing for a prolonged amount of time on concrete floor for work and her left knee has been aching and burning all day. OBJECTIVE    Modality rationale: decrease pain to improve the patients ability to tolerate ADL's with ease.    Min Type Additional Details    [] Estim:  []Unatt       []IFC  []Premod                        []Other:  []w/ice   []w/heat  Position:  Location:    [] Estim: []Att    []TENS instruct  []NMES                    []Other:  []w/US   []w/ice   []w/heat  Position:  Location:    []  Traction: [] Cervical       []Lumbar                       [] Prone          []Supine                       []Intermittent   []Continuous Lbs:  [] before manual  [] after manual    []  Ultrasound: []Continuous   [] Pulsed                           []1MHz   []3MHz W/cm2:  Location:    []  Iontophoresis with dexamethasone         Location: [] Take home patch   [] In clinic   10 [x]  Ice     []  heat  []  Ice massage  []  Laser   []  Anodyne Position:longsit  Location:left knee    []  Laser with stim  []  Other:  Position:  Location:    []  Vasopneumatic Device Pressure:       [] lo [] med [] hi   Temperature: [] lo [] med [] hi   [x] Skin assessment post-treatment:  [x]intact []redness- no adverse reaction    []redness - adverse reaction:       30 min Therapeutic Exercise:  [x] See flow sheet :   Rationale: increase ROM and increase strength to improve the patients ability to perform functional task withe ease. 8 min Manual Therapy:  Patella mobs, PROM flex/ext, flex/ext mobs   Rationale: decrease pain, increase ROM and increase tissue extensibility to improve functional mobility. With   [] TE   [] TA   [] neuro   [] other: Patient Education: [x] Review HEP    [] Progressed/Changed HEP based on:   [] positioning   [] body mechanics   [] transfers   [] heat/ice application    [] other:      Other Objective/Functional Measures:   Ball squeezes added 10x5\"     Pain Level (0-10 scale) post treatment: 6    ASSESSMENT/Changes in Function:   Pt notes discomfort at the anterior distal and anterolateral left knee. Pt reports increased pain after prolonged standing and notes a clicking of the left knee. Pt able to perform therex as prescribed with no reports of increased pain in the left knee. Decreased pain reported following treatement. Patient will continue to benefit from skilled PT services to modify and progress therapeutic interventions, address functional mobility deficits, address ROM deficits, address strength deficits, analyze and address soft tissue restrictions, analyze and cue movement patterns, analyze and modify body mechanics/ergonomics and assess and modify postural abnormalities to attain remaining goals. [x]  See Plan of Care  []  See progress note/recertification  []  See Discharge Summary         Progress towards goals / Updated goals:  Short Term Goals: To be accomplished in 2 weeks:  1. Pt will demonstrate I and compliance with HEP to maximize therapeutic effect. Progressing 9/24/19- Pt reports initiation  2. Pt will improve left knee AROM 0-110 for improved gait and transfer ease. Long Term Goals: To be accomplished in 4 weeks: 1. Pt will demonstrate 15 SLR on left without knee pain to improve quad activation and knee strength in standing. 2. Pt will ambulate throughout clinic without notable antalgia or gait deficits to improve mechanics and proper loading with ADLs. 3. Pt will demonstrate ability to transfer from sit to stand without UE assist 10x for improved functional mobility and mechanics.   4. Pt will improve FOTO score to 58 to demonstrate improved quality of life and function    PLAN  []  Upgrade activities as tolerated     []  Continue plan of care  []  Update interventions per flow sheet       []  Discharge due to:_  []  Other:_      Cyndy Busch PTA 9/26/2019  4:10 PM    Future Appointments   Date Time Provider Angel Luis Navarrete   10/1/2019  2:30 PM [facility]   10/4/2019  4:00 PM [facility]

## 2019-10-01 ENCOUNTER — HOSPITAL ENCOUNTER (OUTPATIENT)
Dept: PHYSICAL THERAPY | Age: 24
Discharge: HOME | End: 2019-10-01
Payer: COMMERCIAL

## 2019-10-01 PROCEDURE — 97110 THERAPEUTIC EXERCISES: CPT

## 2019-10-01 PROCEDURE — 97140 MANUAL THERAPY 1/> REGIONS: CPT

## 2019-10-01 NOTE — PROGRESS NOTES
PT DAILY TREATMENT NOTE 10-18    Patient Name: Zbigniew Daniel  Date:10/1/2019  : 1995  [x]  Patient  Verified  Payor: Ana Mishra / Plan: [employer] / Product Type: HMO /    In time:2:29  Out time:3:18  Total Treatment Time (min): 52  Visit #: 4 of 8    Treatment Area: Pain in left knee [M25.562]  Unilateral primary osteoarthritis, left knee [M17.12]    SUBJECTIVE  Pain Level (0-10 scale): 5  Any medication changes, allergies to medications, adverse drug reactions, diagnosis change, or new procedure performed?: [x] No    [] Yes (see summary sheet for update)  Subjective functional status/changes:   [] No changes reported  Pt reports her knee does not hurt as bad today because she has not been standing on it for too long. OBJECTIVE      41 min Therapeutic Exercise:  [x] See flow sheet :   Rationale: increase ROM and increase strength to improve the patients ability to perform functional task with ease. 8 min Manual Therapy:  Patella mobs, PROM flex/ext, flex/ext mobs   Rationale: decrease pain, increase ROM and increase tissue extensibility to improve functional mobility          With   [] TE   [] TA   [] neuro   [] other: Patient Education: [x] Review HEP    [] Progressed/Changed HEP based on:   [] positioning   [] body mechanics   [] transfers   [] heat/ice application    [] other:      Other Objective/Functional Measures:   HR/TR-10x ea      Pain Level (0-10 scale) post treatment: 0    ASSESSMENT/Changes in Function:   Improved mobility noted with exercises today secondary to decreased pain in the left knee. Pt reports decreased pain in the anterior knee with quad sets as well as with knee flexion. No pain reported with end range flex/ext during manual. Pt had no reports of pain post treatment. Gradually progress to closed chain exercises per pt tolerance.     Patient will continue to benefit from skilled PT services to modify and progress therapeutic interventions, address functional mobility deficits, address ROM deficits, address strength deficits, analyze and address soft tissue restrictions, analyze and cue movement patterns, analyze and modify body mechanics/ergonomics and assess and modify postural abnormalities to attain remaining goals. [x]  See Plan of Care  []  See progress note/recertification  []  See Discharge Summary         Progress towards goals / Updated goals:  Short Term Goals: To be accomplished in 2 weeks:  1. Pt will demonstrate I and compliance with HEP to maximize therapeutic effect. Progressing 9/24/19- Pt reports initiation  2. Pt will improve left knee AROM 0-110 for improved gait and transfer ease. Met 10/1/19- AROM left knee flex-114 deg  Long Term Goals: To be accomplished in 4 weeks: 1. Pt will demonstrate 15 SLR on left without knee pain to improve quad activation and knee strength in standing. 2. Pt will ambulate throughout clinic without notable antalgia or gait deficits to improve mechanics and proper loading with ADLs. 3. Pt will demonstrate ability to transfer from sit to stand without UE assist 10x for improved functional mobility and mechanics.   4. Pt will improve FOTO score to 58 to demonstrate improved quality of life and function    PLAN  []  Upgrade activities as tolerated     [x]  Continue plan of care  []  Update interventions per flow sheet       []  Discharge due to:_  []  Other:_      Christo Carmen PTA 10/1/2019  2:04 PM    Future Appointments   Date Time Provider Angel Luis Navarrete   10/1/2019  2:30 PM [facility]fer [facility]   10/4/2019  4:00 PM James Schafer PTA [facility]

## 2019-10-04 ENCOUNTER — HOSPITAL ENCOUNTER (OUTPATIENT)
Dept: PHYSICAL THERAPY | Age: 24
Discharge: HOME | End: 2019-10-04
Payer: COMMERCIAL

## 2019-10-04 PROCEDURE — 97110 THERAPEUTIC EXERCISES: CPT

## 2019-10-04 PROCEDURE — 97140 MANUAL THERAPY 1/> REGIONS: CPT

## 2019-10-04 NOTE — PROGRESS NOTES
PT DAILY TREATMENT NOTE 10-18    Patient Name: Patricia Mccord  Date:10/4/2019  : 1995  [x]  Patient  Verified  Payor: Vikki Rivera / Plan: [employer] / Product Type: HMO /    In time:4:00  Out time:4:40  Total Treatment Time (min): 40  Visit #: 5 of 8    Treatment Area: Pain in left knee [M25.562]  Unilateral primary osteoarthritis, left knee [M17.12]    SUBJECTIVE  Pain Level (0-10 scale): 5  Any medication changes, allergies to medications, adverse drug reactions, diagnosis change, or new procedure performed?: [x] No    [] Yes (see summary sheet for update)  Subjective functional status/changes:   [] No changes reported  Pt reports she has been doing a lot of standing and walking today but when she feels the pain coming on she sits down for a while to give the left knee a break. OBJECTIVE    32 min Therapeutic Exercise:  [x] See flow sheet :   Rationale: increase ROM, increase strength and improve balance to improve the patients ability to perform functional activities with ease. 8 min Manual Therapy:  Patella mobs, PROM flex/ext, flex/ext mobs   Rationale: decrease pain, increase ROM and increase tissue extensibility to improve functional mobility. With   [] TE   [] TA   [] neuro   [] other: Patient Education: [x] Review HEP    [] Progressed/Changed HEP based on:   [] positioning   [] body mechanics   [] transfers   [] heat/ice application    [] other:      Other Objective/Functional Measures:   LAQ- 10x3\" Left only     Pain Level (0-10 scale) post treatment: 0    ASSESSMENT/Changes in Function:   B hip 3-way initiated this visit with pt noting some discomfort in the anterolateral left knee when actively moving the right LE, Pt reports decreased discomfort upon completion of exercise. Pt had no adverse reactions to progressed therex this visit. Pt reports no pain following treatment. Continue to progress to closed chain exercises per pt tolerance.     Patient will continue to benefit from skilled PT services to modify and progress therapeutic interventions, address functional mobility deficits, address ROM deficits, address strength deficits, analyze and address soft tissue restrictions, analyze and cue movement patterns, analyze and modify body mechanics/ergonomics and assess and modify postural abnormalities to attain remaining goals. [x]  See Plan of Care  []  See progress note/recertification  []  See Discharge Summary         Progress towards goals / Updated goals:  Short Term Goals: To be accomplished in 2 weeks:  1. Pt will demonstrate I and compliance with HEP to maximize therapeutic effect. Progressing 9/24/19- Pt reports initiation  2. Pt will improve left knee AROM 0-110 for improved gait and transfer ease. Met 10/1/19- AROM left knee flex-114 deg  Long Term Goals: To be accomplished in 4 weeks: 1. Pt will demonstrate 15 SLR on left without knee pain to improve quad activation and knee strength in standing. 2. Pt will ambulate throughout clinic without notable antalgia or gait deficits to improve mechanics and proper loading with ADLs. 3. Pt will demonstrate ability to transfer from sit to stand without UE assist 10x for improved functional mobility and mechanics.   4. Pt will improve FOTO score to 58 to demonstrate improved quality of life and function    PLAN  []  Upgrade activities as tolerated     [x]  Continue plan of care  []  Update interventions per flow sheet       []  Discharge due to:_  []  Other:_      Charla Godoy PTA 10/4/2019  3:57 PM    Future Appointments   Date Time Provider Angel Luis Navarrete   10/4/2019  4:00 PM Sandra Mabry PTA [facility]

## 2019-10-10 ENCOUNTER — HOSPITAL ENCOUNTER (OUTPATIENT)
Dept: PHYSICAL THERAPY | Age: 24
Discharge: HOME | End: 2019-10-10
Payer: COMMERCIAL

## 2019-10-10 PROCEDURE — 97110 THERAPEUTIC EXERCISES: CPT

## 2019-10-10 PROCEDURE — 97140 MANUAL THERAPY 1/> REGIONS: CPT

## 2019-10-11 ENCOUNTER — HOSPITAL ENCOUNTER (OUTPATIENT)
Dept: PHYSICAL THERAPY | Age: 24
Discharge: HOME | End: 2019-10-11
Payer: COMMERCIAL

## 2019-10-11 PROCEDURE — 97110 THERAPEUTIC EXERCISES: CPT

## 2019-10-11 PROCEDURE — 97530 THERAPEUTIC ACTIVITIES: CPT

## 2019-10-11 PROCEDURE — 97140 MANUAL THERAPY 1/> REGIONS: CPT

## 2019-10-11 NOTE — PROGRESS NOTES
PT DAILY TREATMENT NOTE 10-18    Patient Name: Eduardo Hsu  Date:10/11/2019  : 1995  [x]  Patient  Verified  Payor: Cara Delgado / Plan: [employer] / Product Type: HMO /    In time:11:29  Out time:12:25  Total Treatment Time (min): 64  Visit #: 7 of 8    Treatment Area: Pain in left knee [M25.562]  Unilateral primary osteoarthritis, left knee [M17.12]    SUBJECTIVE  Pain Level (0-10 scale): 0  Any medication changes, allergies to medications, adverse drug reactions, diagnosis change, or new procedure performed?: [x] No    [] Yes (see summary sheet for update)  Subjective functional status/changes:   [] No changes reported  Pt reports she has not been up on her feet a lot today and is feeling no pain in her left knee. OBJECTIVE      28 min Therapeutic Exercise:  [x] See flow sheet :   Rationale: increase ROM, increase strength and improve balance to improve the patients ability to perform functional task with ease    20 min Therapeutic Activity:  [x]  See flow sheet :   Rationale: increase ROM, increase strength, improve coordination and improve balance  to improve the patients ability to perform functional activities with ease. 8 min Manual Therapy:  ITB massage, grade 3 patellar glides to correct lateral tilt   Rationale: decrease pain, increase ROM and increase tissue extensibility to improve functional mobility. With   [] TE   [] TA   [] neuro   [] other: Patient Education: [x] Review HEP    [] Progressed/Changed HEP based on:   [] positioning   [] body mechanics   [] transfers   [] heat/ice application    [] other:      Other Objective/Functional Measures:      Pain Level (0-10 scale) post treatment: 0    ASSESSMENT/Changes in Function:   Pt reports continued pain with prolonged standing and walking. Improved mobility with exercises noted throughout therex secondary to some decreased pain in the left knee.  Pt displays WNL PROM of the left knee and reports no pain at end range flex and ext. Pt continues to display TTP in the lateral left knee and continues to display a tilted left patella. Patient will continue to benefit from skilled PT services to modify and progress therapeutic interventions, address functional mobility deficits, address ROM deficits, address strength deficits, analyze and address soft tissue restrictions, analyze and cue movement patterns, analyze and modify body mechanics/ergonomics and assess and modify postural abnormalities to attain remaining goals. []  See Plan of Care  []  See progress note/recertification  []  See Discharge Summary         Progress towards goals / Updated goals:  Short Term Goals: To be accomplished in 2 weeks:  1. Pt will demonstrate I and compliance with HEP to maximize therapeutic effect. Progressing 9/24/19- Pt reports initiation  2. Pt will improve left knee AROM 0-110 for improved gait and transfer ease. Met 10/1/19- AROM left knee flex-114 deg  Long Term Goals: To be accomplished in 4 weeks: 1. Pt will demonstrate 15 SLR on left without knee pain to improve quad activation and knee strength in standing. Progressing 10/11/19- Pt reports very mild pain in the ant left knee with 15 SLR's and muscle soreness in the quad. 2. Pt will ambulate throughout clinic without notable antalgia or gait deficits to improve mechanics and proper loading with ADLs. 3. Pt will demonstrate ability to transfer from sit to stand without UE assist 10x for improved functional mobility and mechanics. Met 10/11/19- Pt reports no pain with 10x sit to stand  4. Pt will improve FOTO score to 58 to demonstrate improved quality of life and function.  Progressing 10/11/19- FOTO score- 50    PLAN  []  Upgrade activities as tolerated     [x]  Continue plan of care  []  Update interventions per flow sheet       []  Discharge due to:_  []  Other:_      Eligio Parks PTA 10/11/2019  11:37 AM    Future Appointments   Date Time Provider Angel Luis Navarrete   10/14/2019  2:30 PM [facility]   10/15/2019  3:30 PM [facility]

## 2019-10-14 ENCOUNTER — HOSPITAL ENCOUNTER (OUTPATIENT)
Dept: PHYSICAL THERAPY | Age: 24
Discharge: HOME | End: 2019-10-14
Payer: COMMERCIAL

## 2019-10-14 PROCEDURE — 97140 MANUAL THERAPY 1/> REGIONS: CPT

## 2019-10-14 PROCEDURE — 97110 THERAPEUTIC EXERCISES: CPT

## 2019-10-14 NOTE — PROGRESS NOTES
PT DAILY TREATMENT NOTE 10-18    Patient Name: Quan Walker  Date:10/14/2019  : 1995  [x]  Patient  Verified  Payor: Babak Rowan / Plan: [employer] / Product Type: HMO /    In time:2:30  Out time:3:14  Total Treatment Time (min): 44  Visit #: 8 of 8    Treatment Area: Pain in left knee [M25.562]  Unilateral primary osteoarthritis, left knee [M17.12]    SUBJECTIVE  Pain Level (0-10 scale): 7  Any medication changes, allergies to medications, adverse drug reactions, diagnosis change, or new procedure performed?: [x] No    [] Yes (see summary sheet for update)  Subjective functional status/changes:   [] No changes reported  Pt reports she has been on her feet all day and her left knee is aching. OBJECTIVE    36 min Therapeutic Exercise:  [x] See flow sheet :   Rationale: increase ROM, increase strength and improve coordination to improve the patients ability to perform functional task with ease. 8 min Manual Therapy:   ITB massage, grade 3 patellar glides to correct lateral tilt, STM left hamstring/gastroc. Rationale: decrease pain, increase ROM and increase tissue extensibility to improve functional mobility. With   [] TE   [] TA   [] neuro   [] other: Patient Education: [x] Review HEP    [] Progressed/Changed HEP based on:   [] positioning   [] body mechanics   [] transfers   [] heat/ice application    [] other:      Other Objective/Functional Measures:   Mini squats-10x     Pain Level (0-10 scale) post treatment: 0    ASSESSMENT/Changes in Function:   Pt reports a 25% improvement in the left knee since [facility] noting improved mobility in the left knee and improved ease at work. Pt notes her main issue is pain in the anterolateral left knee with prolonged WB. Pt TTP at the left lateral patella during medial patella gliding. Mini squats initiated this visit with pt displaying valgus at the B knees and requiring cueing for proper form.  Pt notes mild discomfort in the anterior left knee but reports no pain. Continue PT to increase left knee stability to improve patella tracking and decrease pain with ADL's. Patient will continue to benefit from skilled PT services to modify and progress therapeutic interventions, address functional mobility deficits, address ROM deficits, address strength deficits, analyze and address soft tissue restrictions, analyze and cue movement patterns, analyze and modify body mechanics/ergonomics and assess and modify postural abnormalities to attain remaining goals. [x]  See Plan of Care  []  See progress note/recertification  []  See Discharge Summary         Progress towards goals / Updated goals:  Short Term Goals: To be accomplished in 2 weeks:  1. Pt will demonstrate I and compliance with HEP to maximize therapeutic effect. Progressing 9/24/19- Pt reports initiation  2. Pt will improve left knee AROM 0-110 for improved gait and transfer ease. Met 10/1/19- AROM left knee flex-114 deg  Long Term Goals: To be accomplished in 4 weeks: 1. Pt will demonstrate 15 SLR on left without knee pain to improve quad activation and knee strength in standing. Progressing 10/11/19- Pt reports very mild pain in the ant left knee with 15 SLR's and muscle soreness in the quad. 2. Pt will ambulate throughout clinic without notable antalgia or gait deficits to improve mechanics and proper loading with ADLs. Met 10/14/19- Pt displays no gait deficits and reports no pain with ambulation. 3. Pt will demonstrate ability to transfer from sit to stand without UE assist 10x for improved functional mobility and mechanics. Met 10/11/19- Pt reports no pain with 10x sit to stand  4. Pt will improve FOTO score to 58 to demonstrate improved quality of life and function.  Progressing 10/11/19- FOTO score- 50    PLAN  []  Upgrade activities as tolerated     [x]  Continue plan of care  []  Update interventions per flow sheet       []  Discharge due to:_  []  Other:_      Danni Avila, PTA 10/14/2019  1:07 PM    Future Appointments   Date Time Provider Angel Luis Rosalba   10/14/2019  2:30 PM [facility]   10/15/2019  3:30 PM Frankie Roach PTA [facility]

## 2019-10-14 NOTE — PROGRESS NOTES
[facility]  [address]  [place], [address]  [phone] fax    Physical Therapy Progress Note  Patient name: Quan Walker Start of Care: 2019   Referral source: Pepe Spring : 1995   Medical/Treatment Diagnosis: Pain in left knee [M25.562]  Unilateral primary osteoarthritis, left knee [M17.12]  Payor: Babak Rowan / Plan: [employer] / Product Type: HMO /  Onset Date:1 week     Prior Hospitalization: see medical history Provider#: [id number]   Medications: Verified on Patient Summary List    Comorbidities: asthma, arthritis, right upper lobectomy  Prior Level of Function:Pt able to ambulate and perform work duties unlimited without knee pain  Visits from Start of Care: 8    Missed Visits: 0    Short Term Goals: To be accomplished in 2 weeks:  1. Pt will demonstrate I and compliance with HEP to maximize therapeutic effect. Progressing 19- Pt reports initiation  2. Pt will improve left knee AROM 0-110 for improved gait and transfer ease. Met 10/1/19- AROM left knee flex-114 deg  Long Term Goals: To be accomplished in 4 weeks: 1. Pt will demonstrate 15 SLR on left without knee pain to improve quad activation and knee strength in standing. Progressing 10/11/19- Pt reports very mild pain in the ant left knee with 15 SLR's and muscle soreness in the quad. 2. Pt will ambulate throughout clinic without notable antalgia or gait deficits to improve mechanics and proper loading with ADLs. Met 10/14/19- Pt displays no gait deficits and reports no pain with ambulation. 3. Pt will demonstrate ability to transfer from sit to stand without UE assist 10x for improved functional mobility and mechanics. Met 10/11/19- Pt reports no pain with 10x sit to stand  4. Pt will improve FOTO score to 58 to demonstrate improved quality of life and function.  Progressing 10/11/19- FOTO score- 50    Established Goals:        Excellent         Good Limited            None  [] Increased ROM   []  [x]  []  []  [] Increased Strength  []  []  [x]  []  [] Increased Mobility  []  [x]  []  []   [] Decreased Pain   []  []  [x]  []  [] Decreased Swelling  []  []  []  [x]    Key Functional Changes:   ASSESSMENT/Changes in Function:   Pt reports a 25% improvement in the left knee since [facility] noting improved mobility in the left knee and intermittent improved ease at work. Pt reports continued pain with prolonged standing and walking. Improved mobility with exercises noted throughout therex secondary to some decreased pain in the left knee. Pt displays WNL PROM of the left knee and reports no pain at end range flex and ext. Pt continues to display TTP in the lateral left patella and continues to display a tilted left patella. Continued PT to increased left knee stability and improve patella tracking to decrease pain with ADL's. Updated Goals: to be achieved in 3-4 weeks:  1. Pt will demonstrate I and compliance with HEP to maximize therapeutic effect. Progressing 9/24/19- Pt reports initiation  2. Pt will demonstrate 15 SLR on left without knee pain to improve quad activation and knee strength in standing. Progressing 10/11/19- Pt reports very mild pain in the ant left knee with 15 SLR's and muscle soreness in the quad. 3. Pt will improve FOTO score to 58 to demonstrate improved quality of life and function. Progressing 10/11/19- FOTO score- 50  4. Pt will demonstrate the ability to stand longer than < or = to 30 mins without pain or discomfort in the left knee.       ASSESSMENT/RECOMMENDATIONS:  [x]Continue therapy per initial plan/protocol at a frequency of  2 x per week for 3-4 weeks  []Continue therapy with the following recommended changes:_____________________      _____________________________________________________________________  []Discontinue therapy progressing towards or have reached established goals  []Discontinue therapy due to lack of appreciable progress towards goals  []Discontinue therapy due to lack of attendance or compliance  []Await Physician's recommendations/decisions regarding therapy  []Other:________________________________________________________________    Thank you for this referral.    Angie Jeanine, RENNY 10/14/2019 6:34 PM  NOTE TO PHYSICIAN:  PLEASE COMPLETE THE ORDERS BELOW AND   FAX TO [facility]: [phone]  If you are unable to process this request in 24 hours please contact our office: [phone]    ? I have read the above report and request that my patient continue as recommended. ? I have read the above report and request that my patient continue therapy with the following changes/special instructions:__________________________________________________________  ? I have read the above report and request that my patient be discharged from therapy.     Physicians signature: ______________________________Date: ______Time:______

## 2019-10-15 ENCOUNTER — HOSPITAL ENCOUNTER (OUTPATIENT)
Dept: PHYSICAL THERAPY | Age: 24
Discharge: HOME | End: 2019-10-15
Payer: COMMERCIAL

## 2019-10-15 PROCEDURE — 97140 MANUAL THERAPY 1/> REGIONS: CPT

## 2019-10-15 PROCEDURE — 97110 THERAPEUTIC EXERCISES: CPT

## 2019-10-15 NOTE — PROGRESS NOTES
PT DAILY TREATMENT NOTE 10-18    Patient Name: Aruna Francisco  Date:10/15/2019  : 1995  [x]  Patient  Verified  Payor: Jessica Benitez / Plan: [employer] / Product Type: HMO /    In time:3:30  Out time:4:11  Total Treatment Time (min): 41  Visit #: 1 of     Treatment Area: Pain in left knee [M25.562]  Unilateral primary osteoarthritis, left knee [M17.12]    SUBJECTIVE  Pain Level (0-10 scale): 7  Any medication changes, allergies to medications, adverse drug reactions, diagnosis change, or new procedure performed?: [x] No    [] Yes (see summary sheet for update)  Subjective functional status/changes:   [] No changes reported  Pt reports she has been on her feet all day at work and she is having pain in the middle of her left knee. OBJECTIVE    33 min Therapeutic Exercise:  [x] See flow sheet :   Rationale: increase ROM and increase strength to improve the patients ability to perform functional task with ease. 8 min Manual Therapy:  ITB massage, grade 3 patellar glides to correct lateral tilt, STM left hamstring/gastroc. Rationale: decrease pain, increase ROM and increase tissue extensibility to improve functional mobility. With   [] TE   [] TA   [] neuro   [] other: Patient Education: [x] Review HEP    [] Progressed/Changed HEP based on:   [] positioning   [] body mechanics   [] transfers   [] heat/ice application    [] other:      Other Objective/Functional Measures:     Pain Level (0-10 scale) post treatment: 4    ASSESSMENT/Changes in Function:   Pt had no adverse reactions to treatment this visit and performed therex as prescribed with no complaints of pain. Progress to closed chain exercises per pt tolerance to increase left knee strength to aid with ease of ADL's.     Patient will continue to benefit from skilled PT services to modify and progress therapeutic interventions, address functional mobility deficits, address ROM deficits, address strength deficits, analyze and address soft tissue restrictions, analyze and cue movement patterns, analyze and modify body mechanics/ergonomics and assess and modify postural abnormalities to attain remaining goals. [x]  See Plan of Care  []  See progress note/recertification  []  See Discharge Summary         Progress towards goals / Updated goals:  1. Pt will demonstrate I and compliance with HEP to maximize therapeutic effect. Progressing 9/24/19- Pt reports initiation  2. Pt will demonstrate 15 SLR on left without knee pain to improve quad activation and knee strength in standing. Progressing 10/11/19- Pt reports very mild pain in the ant left knee with 15 SLR's and muscle soreness in the quad. 3. Pt will improve FOTO score to 58 to demonstrate improved quality of life and function. Progressing 10/11/19- FOTO score- 50  4. Pt will demonstrate the ability to stand longer than < or = to 30 mins without pain or discomfort in the left knee. PLAN  []  Upgrade activities as tolerated     [x]  Continue plan of care  []  Update interventions per flow sheet       []  Discharge due to:_  []  Other:_      Christo Carmen PTA 10/15/2019  4:04 PM    No future appointments.

## 2019-10-21 ENCOUNTER — HOSPITAL ENCOUNTER (OUTPATIENT)
Dept: PHYSICAL THERAPY | Age: 24
Discharge: HOME | End: 2019-10-21
Payer: COMMERCIAL

## 2019-10-21 PROCEDURE — 97110 THERAPEUTIC EXERCISES: CPT

## 2019-10-21 PROCEDURE — 97140 MANUAL THERAPY 1/> REGIONS: CPT

## 2019-10-21 NOTE — PROGRESS NOTES
PT DAILY TREATMENT NOTE     Patient Name: Jacqueline Howard  Date:10/21/2019  : 1995  [x]  Patient  Verified  Payor: Connor Peña / Plan: [employer] / Product Type: HMO /    In time:2:30  Out time:3:09  Total Treatment Time (min): 44  Visit #: 2 of     Treatment Area: Pain in left knee [M25.562]  Unilateral primary osteoarthritis, left knee [M17.12]    SUBJECTIVE  Pain Level (0-10 scale): 0/10  Any medication changes, allergies to medications, adverse drug reactions, diagnosis change, or new procedure performed?: [x] No    [] Yes (see summary sheet for update)  Subjective functional status/changes:   [x] No changes reported    OBJECTIVE    31 min Therapeutic Exercise:  [x] See flow sheet :   Rationale: increase ROM, increase strength and increase proprioception to improve the patients ability to perform functional activities. 8 min Manual Therapy:  Left patellar mobs, Stick to Left ITB and vastus lat. Rationale: decrease pain, increase ROM and increase tissue extensibility to improve ease of performing functional activities. With   [x] TE   [] TA   [] neuro   [] other: Patient Education: [x] Review HEP    [] Progressed/Changed HEP based on:   [] positioning   [] body mechanics   [] transfers   [] heat/ice application    [] other:      Other Objective/Functional Measures: Added 6\" step ups forward and lateral 10x each. Pain Level (0-10 scale) post treatment: 0/10    ASSESSMENT/Changes in Function: Pt denied pain with exercises today. Pt reported minimal standing/walking today, was mostly sitting which contributed to having less pain today. Continue PT to increase Left LE strength to improve quality of life.     Patient will continue to benefit from skilled PT services to modify and progress therapeutic interventions, address functional mobility deficits, address ROM deficits, address strength deficits, analyze and address soft tissue restrictions and analyze and modify body mechanics/ergonomics to attain remaining goals. [x]  See Plan of Care  []  See progress note/recertification  []  See Discharge Summary         Progress towards goals / Updated goals:  1. Pt will demonstrate I and compliance with HEP to maximize therapeutic effect. Progressing 9/24/19- Pt reports initiation  2. Pt will demonstrate 15 SLR on left without knee pain to improve quad activation and knee strength in standing. Progressing 10/11/19- Pt reports very mild pain in the ant left knee with 15 SLR's and muscle soreness in the quad. 3. Pt will improve FOTO score to 58 to demonstrate improved quality of life and function. Progressing 10/11/19- FOTO score- 50  4. Pt will demonstrate the ability to stand longer than < or = to 30 mins without pain or discomfort in the left knee.     PLAN  []  Upgrade activities as tolerated     [x]  Continue plan of care  []  Update interventions per flow sheet       []  Discharge due to:_  []  Other:_      Amy Galeas, RENNY 10/21/2019  2:19 PM    Future Appointments   Date Time Provider Angel Luis Navarrete   10/21/2019  2:30 PM [facility]   10/24/2019  3:00 PM Randi Ely PTA [facility]

## 2019-10-24 ENCOUNTER — HOSPITAL ENCOUNTER (OUTPATIENT)
Dept: PHYSICAL THERAPY | Age: 24
Discharge: HOME | End: 2019-10-24
Payer: COMMERCIAL

## 2019-10-24 PROCEDURE — 97140 MANUAL THERAPY 1/> REGIONS: CPT

## 2019-10-24 PROCEDURE — 97110 THERAPEUTIC EXERCISES: CPT

## 2019-10-24 NOTE — PROGRESS NOTES
PT DAILY TREATMENT NOTE 10-18    Patient Name: Rakesh Toth  Date:10/24/2019  : 1995  [x]  Patient  Verified  Payor: Shelia Rangel / Plan: [employer] / Product Type: HMO /    In time:3:00  Out time:3:40  Total Treatment Time (min): 40  Visit #: 3 of 6-8      Treatment Area: Pain in left knee [M25.562]  Unilateral primary osteoarthritis, left knee [M17.12]    SUBJECTIVE  Pain Level (0-10 scale): 0  Any medication changes, allergies to medications, adverse drug reactions, diagnosis change, or new procedure performed?: [x] No    [] Yes (see summary sheet for update)  Subjective functional status/changes:   [] No changes reported  Pt reports no pain coming in today because she has not been up on her feet a lot today. OBJECTIVE    32 min Therapeutic Exercise:  [x] See flow sheet :   Rationale: increase ROM, increase strength and improve coordination to improve the patients ability to perform functional task with ease. 8 min Manual Therapy:  ITB massage, grade 3 patellar glides to correct lateral tilt, STM left hamstring/gastroc   Rationale: decrease pain, increase ROM and increase tissue extensibility to improve functional mobility. With   [] TE   [] TA   [] neuro   [] other: Patient Education: [x] Review HEP    [] Progressed/Changed HEP based on:   [] positioning   [] body mechanics   [] transfers   [] heat/ice application    [] other:      Other Objective/Functional Measures:   Increased reps to most exercises      Pain Level (0-10 scale) post treatment: 0    ASSESSMENT/Changes in Function:   Pt continues to reports pain with prolonged standing after more than 30mins. Pt reports no pain with therex today. Continue to strengthen the left knee to increased standing tolerance and improve ADL's.     Patient will continue to benefit from skilled PT services to modify and progress therapeutic interventions, address functional mobility deficits, address ROM deficits, address strength deficits, analyze and address soft tissue restrictions, analyze and cue movement patterns, analyze and modify body mechanics/ergonomics and assess and modify postural abnormalities to attain remaining goals. [x]  See Plan of Care  []  See progress note/recertification  []  See Discharge Summary         Progress towards goals / Updated goals:  Progress towards goals / Updated goals:  1. Pt will demonstrate I and compliance with HEP to maximize therapeutic effect. Progressing 9/24/19- Pt reports initiation  2. Pt will demonstrate 15 SLR on left without knee pain to improve quad activation and knee strength in standing. Progressing 10/11/19- Pt reports very mild pain in the ant left knee with 15 SLR's and muscle soreness in the quad. 3. Pt will improve FOTO score to 58 to demonstrate improved quality of life and function. Progressing 10/11/19- FOTO score- 50  4. Pt will demonstrate the ability to stand longer than < or = to 30 mins without pain or discomfort in the left knee.     PLAN  []  Upgrade activities as tolerated     [x]  Continue plan of care  []  Update interventions per flow sheet       []  Discharge due to:_  []  Other:_      Charla Godoy PTA 10/24/2019  1:32 PM    Future Appointments   Date Time Provider Angel Luis Navarrete   10/24/2019  3:00 PM Sandra Mabry PTA [facility]

## 2019-10-30 ENCOUNTER — HOSPITAL ENCOUNTER (OUTPATIENT)
Dept: PHYSICAL THERAPY | Age: 24
Discharge: HOME | End: 2019-10-30
Payer: COMMERCIAL

## 2019-10-30 PROCEDURE — 97140 MANUAL THERAPY 1/> REGIONS: CPT

## 2019-10-30 PROCEDURE — 97110 THERAPEUTIC EXERCISES: CPT

## 2019-10-30 NOTE — PROGRESS NOTES
PT DAILY TREATMENT NOTE 10-18    Patient Name: Armando Cedillo  Date:10/30/2019  : 1995  [x]  Patient  Verified  Payor: Francia Samaniego / Plan: [employer] / Product Type: HMO /    In time:8:04  Out time:8:38  Total Treatment Time (min): 34  Visit #: 4 of     Treatment Area: Pain in left knee [M25.562]  Unilateral primary osteoarthritis, left knee [M17.12]    SUBJECTIVE  Pain Level (0-10 scale): 2  Any medication changes, allergies to medications, adverse drug reactions, diagnosis change, or new procedure performed?: [x] No    [] Yes (see summary sheet for update)  Subjective functional status/changes:   [] No changes reported  Pt reports she thinks the weather is causing her some knee pain this morning. OBJECTIVE      26 min Therapeutic Exercise:  [x] See flow sheet :   Rationale: increase ROM and increase strength to improve the patients ability to perform functional task with ease. 8 min Manual Therapy:  ITB massage, grade 3 patellar glides to correct lateral tilt, STM left hamstring/gastroc   Rationale: decrease pain, increase ROM and increase tissue extensibility to improve functional mobility. With   [] TE   [] TA   [] neuro   [] other: Patient Education: [x] Review HEP    [] Progressed/Changed HEP based on:   [] positioning   [] body mechanics   [] transfers   [] heat/ice application    [] other:      Other Objective/Functional Measures:   Sid[place]pping- org 30\"x2  SLS- 30\"x2     Pain Level (0-10 scale) post treatment: 0    ASSESSMENT/Changes in Function:   PT demos improved strength and stability in the left knee. Pt reports improved standing tolerance noting the ability to stand for about an hour before feeling fatigued in the left knee. Pt challenged well with SLS noting one LOB during exercise with good recovery. Continued treatment to strengthen the left knee and improve functional mobility.      Patient will continue to benefit from skilled PT services to modify and progress therapeutic interventions, address functional mobility deficits, address ROM deficits, address strength deficits, analyze and address soft tissue restrictions, analyze and cue movement patterns, analyze and modify body mechanics/ergonomics and assess and modify postural abnormalities to attain remaining goals. [x]  See Plan of Care  []  See progress note/recertification  []  See Discharge Summary         Progress towards goals / Updated goals:  1. Pt will demonstrate I and compliance with HEP to maximize therapeutic effect. Progressing 9/24/19- Pt reports initiation  2. Pt will demonstrate 15 SLR on left without knee pain to improve quad activation and knee strength in standing. Met 10/30/19- Pt reports no pain with SLR and able to complete 15 with no lag. 3. Pt will improve FOTO score to 58 to demonstrate improved quality of life and function. Progressing 10/11/19- FOTO score- 50  4. Pt will demonstrate the ability to stand longer than < or = to 30 mins without pain or discomfort in the left knee.  Progressing 10/30/19- Pt reports she was able to stand for about an hour before feeling fatigued in the left knee and reports very minimal pain.       PLAN  []  Upgrade activities as tolerated     [x]  Continue plan of care  []  Update interventions per flow sheet       []  Discharge due to:_  []  Other:_      Charla Godoy PTA 10/30/2019  7:24 AM    Future Appointments   Date Time Provider Angel Luis Navarrete   10/30/2019  8:00 AM Sandra Mabry PTA [facility]   10/31/2019  8:30 AM [facility]

## 2019-10-31 ENCOUNTER — HOSPITAL ENCOUNTER (OUTPATIENT)
Dept: PHYSICAL THERAPY | Age: 24
Discharge: HOME | End: 2019-10-31
Payer: COMMERCIAL

## 2019-10-31 PROCEDURE — 97110 THERAPEUTIC EXERCISES: CPT

## 2019-10-31 NOTE — PROGRESS NOTES
PT DAILY TREATMENT NOTE     Patient Name: Rakesh Toth  Date:10/31/2019  : 1995  [x]  Patient  Verified  Payor: Shelia Rangel / Plan: [employer] / Product Type: HMO /    In time:8:29  Out time:9:01  Total Treatment Time (min): 32  Visit #: 5 of 6-8    Treatment Area: Pain in left knee [M25.562]  Unilateral primary osteoarthritis, left knee [M17.12]    SUBJECTIVE  Pain Level (0-10 scale): 0/10  Any medication changes, allergies to medications, adverse drug reactions, diagnosis change, or new procedure performed?: [x] No    [] Yes (see summary sheet for update)  Subjective functional status/changes:   [] No changes reported  \"It's feeling better, occasional locks up on me. \"     OBJECTIVE    32 min Therapeutic Exercise:  [x] See flow sheet :   Rationale: increase ROM, increase strength and increase proprioception to improve the patients ability to perform functional tasks. With   [x] TE   [] TA   [] neuro   [] other: Patient Education: [x] Review HEP    [] Progressed/Changed HEP based on:   [] positioning   [] body mechanics   [] transfers   [] heat/ice application    [] other:      Other Objective/Functional Measures: Increased PRE's to 2#, increased lateral side-steps to 60'. Pain Level (0-10 scale) post treatment: 0/10    ASSESSMENT/Changes in Function: Demonstrated improved ease with exercises, denied pain with exercises today. Pt reports decreased pain frequency and intensity. Continue PT to increase strength to improve ease of performing functional activities. Patient will continue to benefit from skilled PT services to modify and progress therapeutic interventions, address functional mobility deficits, address ROM deficits, address strength deficits, analyze and address soft tissue restrictions and analyze and modify body mechanics/ergonomics to attain remaining goals.      [x]  See Plan of Care  []  See progress note/recertification  []  See Discharge Summary         Progress towards goals / Updated goals:  1. Pt will demonstrate I and compliance with HEP to maximize therapeutic effect. Progressing 9/24/19 - Pt reports initiation  2. Pt will demonstrate 15 SLR on left without knee pain to improve quad activation and knee strength in standing. Met 10/30/19- Pt reports no pain with SLR and able to complete 15 with no lag. 3. Pt will improve FOTO score to 58 to demonstrate improved quality of life and function. Progressing 10/11/19- FOTO score- 50  4. Pt will demonstrate the ability to stand longer than < or = to 30 mins without pain or discomfort in the left knee. Progressing 10/30/19- Pt reports she was able to stand for about an hour before feeling fatigued in the left knee and reports very minimal pain.     PLAN  []  Upgrade activities as tolerated     [x]  Continue plan of care  []  Update interventions per flow sheet       []  Discharge due to:_  []  Other:_      Billy Streeter PTA 10/31/2019  8:27 AM    Future Appointments   Date Time Provider Angel Luis Navarrete   10/31/2019  8:30 AM Wili Mariano PTA [id number]

## 2019-11-04 ENCOUNTER — APPOINTMENT (OUTPATIENT)
Dept: PHYSICAL THERAPY | Age: 24
End: 2019-11-04
Payer: COMMERCIAL

## 2019-11-05 ENCOUNTER — APPOINTMENT (OUTPATIENT)
Dept: PHYSICAL THERAPY | Age: 24
End: 2019-11-05
Payer: COMMERCIAL

## 2019-11-26 ENCOUNTER — HOSPITAL ENCOUNTER (OUTPATIENT)
Dept: PHYSICAL THERAPY | Age: 24
Discharge: HOME | End: 2019-11-26
Payer: COMMERCIAL

## 2019-11-26 PROCEDURE — 97110 THERAPEUTIC EXERCISES: CPT

## 2019-11-26 NOTE — PROGRESS NOTES
PT DAILY TREATMENT NOTE 10-18    Patient Name: Jacqueline Howard  Date:2019  : 1995  [x]  Patient  Verified  Payor: Connor Peña / Plan: [employer] / Product Type: HMO /    In time:8:00  Out time: 8:38  Total Treatment Time (min): 38  Visit #: 6 of     Treatment Area: Pain in left knee [M25.562]  Unilateral primary osteoarthritis, left knee [M17.12]    SUBJECTIVE  Pain Level (0-10 scale): 0  Any medication changes, allergies to medications, adverse drug reactions, diagnosis change, or new procedure performed?: [x] No    [] Yes (see summary sheet for update)  Subjective functional status/changes:   [] No changes reported  Pt reports her knee is not feeling to bad but she is starting to have pain in her right hip with sitting. OBJECTIVE      38 min Therapeutic Exercise:  [x] See flow sheet :   Rationale: increase ROM and increase strength to improve the patients ability to perform functional task with ease. With   [] TE   [] TA   [] neuro   [] other: Patient Education: [x] Review HEP    [] Progressed/Changed HEP based on:   [] positioning   [] body mechanics   [] transfers   [] heat/ice application    [] other:      Other Objective/Functional Measures:   Hamstring curls- 2# 12x    Pain Level (0-10 scale) post treatment: 0    ASSESSMENT/Changes in Function:   Pt reports increased standing tolerance of about 2 hours without pain or discomfort in the left knee. Pt demos increased strength of the left knee noted with no complaints of pain with progressed therex. Min cueing required to correct ER of the left LE. Continue to strengthen left knee for last two sessions to aid with improved ADL's.     Patient will continue to benefit from skilled PT services to modify and progress therapeutic interventions, address functional mobility deficits, address ROM deficits, address strength deficits, analyze and address soft tissue restrictions, analyze and cue movement patterns, analyze and modify body mechanics/ergonomics and assess and modify postural abnormalities to attain remaining goals. [x]  See Plan of Care  []  See progress note/recertification  []  See Discharge Summary         Progress towards goals / Updated goals:  1. Pt will demonstrate I and compliance with HEP to maximize therapeutic effect. Progressing 9/24/19 - Pt reports initiation  2. Pt will demonstrate 15 SLR on left without knee pain to improve quad activation and knee strength in standing. Met 10/30/19- Pt reports no pain with SLR and able to complete 15 with no lag. 3. Pt will improve FOTO score to 58 to demonstrate improved quality of life and function. Progressing 10/11/19- FOTO score- 50  4.  Pt will demonstrate the ability to stand longer than < or = to 30 mins without pain or discomfort in the left knee. Met 11/26/19- PT reports a standing tolerance of 2 hours without pain     PLAN  []  Upgrade activities as tolerated     [x]  Continue plan of care  []  Update interventions per flow sheet       []  Discharge due to:_  []  Other:_      Sb Cueto PTA 11/26/2019  7:46 AM    Future Appointments   Date Time Provider Angel Luis Navarrete   11/26/2019  8:00 AM Gege Crocker PTA [id number]

## 2019-12-03 ENCOUNTER — HOSPITAL ENCOUNTER (OUTPATIENT)
Dept: PHYSICAL THERAPY | Age: 24
Discharge: HOME | End: 2019-12-03
Payer: COMMERCIAL

## 2019-12-03 PROCEDURE — 97110 THERAPEUTIC EXERCISES: CPT

## 2019-12-03 NOTE — PROGRESS NOTES
PT DAILY TREATMENT NOTE 10-18    Patient Name: Nathaly Gamez  Date:12/3/2019  : 1995  [x]  Patient  Verified  Payor: Tyesha Nguyễn / Plan: [employer] / Product Type: HMO /    In time: 9:30 Out time:10:10  Total Treatment Time (min): 40  Visit #: 7 of 8    Treatment Area: Pain in left knee [M25.562]  Unilateral primary osteoarthritis, left knee [M17.12]    SUBJECTIVE  Pain Level (0-10 scale): Any medication changes, allergies to medications, adverse drug reactions, diagnosis change, or new procedure performed?: [x] No    [] Yes (see summary sheet for update)  Subjective functional status/changes:   [] No changes reported  Pt reports she is feeling fine today. OBJECTIVE      40 min Therapeutic Exercise:  [x] See flow sheet :   Rationale: increase ROM and increase strength to improve the patients ability to           With   [] TE   [] TA   [] neuro   [] other: Patient Education: [x] Review HEP    [] Progressed/Changed HEP based on:   [] positioning   [] body mechanics   [] transfers   [] heat/ice application    [] other:      Other Objective/Functional Measures: no changes to therex this visit. Pain Level (0-10 scale) post treatment: 0    ASSESSMENT/Changes in Function:   Pt continues to report being void of pain in the left knee and displays improved left knee stability. Pt to prepare for d/c next visit. Patient will continue to benefit from skilled PT services to modify and progress therapeutic interventions, address functional mobility deficits, address ROM deficits, address strength deficits, analyze and address soft tissue restrictions, analyze and cue movement patterns, analyze and modify body mechanics/ergonomics and assess and modify postural abnormalities to attain remaining goals. [x]  See Plan of Care  []  See progress note/recertification  []  See Discharge Summary         Progress towards goals / Updated goals:  1.  Pt will demonstrate I and compliance with HEP to maximize therapeutic effect. Met - Pt reports initiation  2. Pt will demonstrate 15 SLR on left without knee pain to improve quad activation and knee strength in standing. Met 10/30/19- Pt reports no pain with SLR and able to complete 15 with no lag. 3. Pt will improve FOTO score to 58 to demonstrate improved quality of life and function. Progressing 10/11/19- FOTO score- 50  4.  Pt will demonstrate the ability to stand longer than < or = to 30 mins without pain or discomfort in the left knee. Met 11/26/19- PT reports a standing tolerance of 2 hours without pain        PLAN  []  Upgrade activities as tolerated     [x]  Continue plan of care  []  Update interventions per flow sheet       []  Discharge due to:_  []  Other:_      Judson Hector PTA 12/3/2019  9:01 AM    Future Appointments   Date Time Provider Angel Luis Navarrete   12/3/2019  9:30 AM Maximo Pacheco PTA [facility]   [facility]2019  [facility]

## 2019-12-05 ENCOUNTER — HOSPITAL ENCOUNTER (OUTPATIENT)
Dept: PHYSICAL THERAPY | Age: 24
Discharge: HOME | End: 2019-12-05
Payer: COMMERCIAL

## 2019-12-05 PROCEDURE — 97110 THERAPEUTIC EXERCISES: CPT

## 2019-12-05 NOTE — PROGRESS NOTES
PT DISCHARGE DAILY NOTE AND [id number]    Patient name: Heriberto Tanner Start of Care:  2019   Referral source: Miguel Marquez : 1995   Medical/Treatment Diagnosis: Pain in left knee [M25.562]  Unilateral primary osteoarthritis, left knee [M17.12] Onset Date:1 week                  Prior Hospitalization: see medical history Provider#: [id number]   Medications: Verified on Patient Summary List    Comorbidities: asthma, arthritis, right upper lobectomy  Prior Level of Function:Pt able to ambulate and perform work duties unlimited without knee pain    Visits from Start of Care: 15    Missed Visits: 1    Reporting Period : 19 to 19    Date:2019  : 1995  [x]  Patient  Verified  Payor: Rupa Rubi / Plan: [address] [place] [address] [place] [employer] / Product Type: HMO /    In time:1:00  Out time:1:40  Total Treatment Time (min): 40  Visit #: 8 of 8    SUBJECTIVE  Pain Level (0-10 scale): 0  Any medication changes, allergies to medications, adverse drug reactions, diagnosis change, or new procedure performed?: [x] No    [] Yes (see summary sheet for update)  Subjective functional status/changes:   [] No changes reported  Pt reports she is feeling fine today with no pain in the left knee. OBJECTIVE    40 min Therapeutic Exercise:  [x] See flow sheet :   Rationale: increase ROM and increase strength to improve the patients ability to preform functional task with ease. With   [] TE   [] TA   [] neuro   [] other: Patient Education: [x] Review HEP    [] Progressed/Changed HEP based on:   [] positioning   [] body mechanics   [] transfers   [] heat/ice application    [] other:      Other Objective/Functional Measures:   Step ups increased to 8\" box- 15x  Hip x 3 increased to green band- 15x  SLS on airex- 2x30\"     Pain Level (0-10 scale) post treatment: 0      ASSESSMENT/Changes in Function:   Pt reports 95% improvement of the left knee and met all of her goals set for therapy.  Pt displays improved left knee stability and increased strength. Pt reports being nearly void of pain and notes an improved standing tolerance of over 2 hours without pain and discomfort and increased ease with stair negotiation. Pt reports improved efficiency at work secondary to decreased left knee pain and improved mobility. Pt given a green thera-band and updated HEP to be discharged to manage self care at home. Progress towards goals / Updated goals:  1. Pt will demonstrate I and compliance with HEP to maximize therapeutic effect. Met - Pt reports initiation  2. Pt will demonstrate 15 SLR on left without knee pain to improve quad activation and knee strength in standing. Met 10/30/19- Pt reports no pain with SLR and able to complete 15 with no lag. 3. Pt will improve FOTO score to 58 to demonstrate improved quality of life and function. Progressing 10/11/19- FOTO score- 50  4.  Pt will demonstrate the ability to stand longer than < or = to 30 mins without pain or discomfort in the left knee. Met 11/26/19- PT reports a standing tolerance of 2 hours without pain       Thank you for this referral!      PLAN  [x]Discontinue therapy: [x]Patient has reached or is progressing toward set goals      []Patient is non-compliant or has abdicated      []Due to lack of appreciable progress towards set goals    Ayo Crocker, PTA 12/5/2019  12:47 PM

## 2019-12-05 NOTE — PROGRESS NOTES
Physical Therapy Discharge Instructions      [facility]  [address]  [place], [address]  [phone] fax    Patient: Rakesh Toth  : 1995      Continue Home Exercise Program 2-3 times per day for 3-4 weeks, then decrease to 2-3 times per week      Continue with    [] Ice  as needed 2-3 times per day     [x] Heat           Follow up with MD:     [] Upon completion of therapy     [x] As needed      Recommendations:     [x]   Return to activity with home program    []   Return to activity with the following modifications:       []Post Rehab Program    []Join Independent aquatic program     []Return to/join local gym        Additional Comments:           Sharlynn Angelucci, PTA 2019 12:50 PM

## 2020-06-09 ENCOUNTER — HOSPITAL ENCOUNTER (EMERGENCY)
Age: 25
Discharge: HOME | End: 2020-06-09
Attending: EMERGENCY MEDICINE
Payer: COMMERCIAL

## 2020-06-09 ENCOUNTER — APPOINTMENT (OUTPATIENT)
Dept: GENERAL RADIOLOGY | Age: 25
End: 2020-06-09
Attending: PHYSICIAN ASSISTANT
Payer: COMMERCIAL

## 2020-06-09 VITALS
DIASTOLIC BLOOD PRESSURE: 81 MMHG | HEART RATE: 76 BPM | TEMPERATURE: 98 F | OXYGEN SATURATION: 100 % | SYSTOLIC BLOOD PRESSURE: 125 MMHG | RESPIRATION RATE: 15 BRPM

## 2020-06-09 DIAGNOSIS — R07.89 CHEST WALL PAIN: Primary | ICD-10-CM

## 2020-06-09 PROBLEM — J30.1 CHRONIC ALLERGIC RHINITIS DUE TO POLLEN: Status: ACTIVE | Noted: 2018-01-25

## 2020-06-09 PROBLEM — K21.9 GASTROESOPHAGEAL REFLUX DISEASE: Status: ACTIVE | Noted: 2018-01-25

## 2020-06-09 PROBLEM — J45.40 MODERATE PERSISTENT ASTHMA WITHOUT COMPLICATION: Status: ACTIVE | Noted: 2018-01-25

## 2020-06-09 LAB
ALBUMIN SERPL-MCNC: 3.1 G/DL (ref 3.4–5)
ALBUMIN/GLOB SERPL: 0.7 {RATIO} (ref 0.8–1.7)
ALP SERPL-CCNC: 97 U/L (ref 45–117)
ALT SERPL-CCNC: 17 U/L (ref 13–56)
ANION GAP SERPL CALC-SCNC: 1 MMOL/L (ref 3–18)
AST SERPL-CCNC: 17 U/L (ref 10–38)
ATRIAL RATE: 67 BPM
BASOPHILS # BLD: 0 K/UL (ref 0–0.1)
BASOPHILS NFR BLD: 0 % (ref 0–2)
BILIRUB SERPL-MCNC: 0.2 MG/DL (ref 0.2–1)
BUN SERPL-MCNC: 12 MG/DL (ref 7–18)
BUN/CREAT SERPL: 17 (ref 12–20)
CALCIUM SERPL-MCNC: 8.5 MG/DL (ref 8.5–10.1)
CALCULATED P AXIS, ECG09: 15 DEGREES
CALCULATED R AXIS, ECG10: 47 DEGREES
CALCULATED T AXIS, ECG11: 40 DEGREES
CHLORIDE SERPL-SCNC: 106 MMOL/L (ref 100–111)
CO2 SERPL-SCNC: 30 MMOL/L (ref 21–32)
CREAT SERPL-MCNC: 0.69 MG/DL (ref 0.6–1.3)
DIAGNOSIS, 93000: NORMAL
DIFFERENTIAL METHOD BLD: ABNORMAL
EOSINOPHIL # BLD: 0.2 K/UL (ref 0–0.4)
EOSINOPHIL NFR BLD: 2 % (ref 0–5)
ERYTHROCYTE [DISTWIDTH] IN BLOOD BY AUTOMATED COUNT: 14.9 % (ref 11.6–14.5)
GLOBULIN SER CALC-MCNC: 4.2 G/DL (ref 2–4)
GLUCOSE SERPL-MCNC: 90 MG/DL (ref 74–99)
HCG UR QL: NEGATIVE
HCT VFR BLD AUTO: 37.2 % (ref 35–45)
HGB BLD-MCNC: 11.6 G/DL (ref 12–16)
LYMPHOCYTES # BLD: 3 K/UL (ref 0.9–3.6)
LYMPHOCYTES NFR BLD: 40 % (ref 21–52)
MCH RBC QN AUTO: 25.1 PG (ref 24–34)
MCHC RBC AUTO-ENTMCNC: 31.2 G/DL (ref 31–37)
MCV RBC AUTO: 80.3 FL (ref 74–97)
MONOCYTES # BLD: 0.5 K/UL (ref 0.05–1.2)
MONOCYTES NFR BLD: 7 % (ref 3–10)
NEUTS SEG # BLD: 3.8 K/UL (ref 1.8–8)
NEUTS SEG NFR BLD: 51 % (ref 40–73)
P-R INTERVAL, ECG05: 148 MS
PLATELET # BLD AUTO: 363 K/UL (ref 135–420)
PMV BLD AUTO: 9.5 FL (ref 9.2–11.8)
POTASSIUM SERPL-SCNC: 3.5 MMOL/L (ref 3.5–5.5)
PROT SERPL-MCNC: 7.3 G/DL (ref 6.4–8.2)
Q-T INTERVAL, ECG07: 384 MS
QRS DURATION, ECG06: 88 MS
QTC CALCULATION (BEZET), ECG08: 405 MS
RBC # BLD AUTO: 4.63 M/UL (ref 4.2–5.3)
SODIUM SERPL-SCNC: 137 MMOL/L (ref 136–145)
TROPONIN I SERPL-MCNC: <0.02 NG/ML (ref 0–0.04)
VENTRICULAR RATE, ECG03: 67 BPM
WBC # BLD AUTO: 7.5 K/UL (ref 4.6–13.2)

## 2020-06-09 PROCEDURE — 80053 COMPREHEN METABOLIC PANEL: CPT

## 2020-06-09 PROCEDURE — 93005 ELECTROCARDIOGRAM TRACING: CPT

## 2020-06-09 PROCEDURE — 71045 X-RAY EXAM CHEST 1 VIEW: CPT

## 2020-06-09 PROCEDURE — 81025 URINE PREGNANCY TEST: CPT

## 2020-06-09 PROCEDURE — 99284 EMERGENCY DEPT VISIT MOD MDM: CPT

## 2020-06-09 PROCEDURE — 84484 ASSAY OF TROPONIN QUANT: CPT

## 2020-06-09 PROCEDURE — 74011250637 HC RX REV CODE- 250/637: Performed by: PHYSICIAN ASSISTANT

## 2020-06-09 PROCEDURE — 85025 COMPLETE CBC W/AUTO DIFF WBC: CPT

## 2020-06-09 RX ORDER — DIAZEPAM 5 MG/1
5 TABLET ORAL
Status: COMPLETED | OUTPATIENT
Start: 2020-06-09 | End: 2020-06-09

## 2020-06-09 RX ORDER — METHOCARBAMOL 750 MG/1
750 TABLET, FILM COATED ORAL
Qty: 20 TAB | Refills: 0 | Status: SHIPPED | OUTPATIENT
Start: 2020-06-09 | End: 2020-10-19

## 2020-06-09 RX ADMIN — DIAZEPAM 5 MG: 5 TABLET ORAL at 17:08

## 2020-06-09 NOTE — ED NOTES
Rosangela Ray is a 25 y.o. female that was discharged in stable condition. The patients diagnosis, condition and treatment were explained to  patient and aftercare instructions were given. The patient verbalized understanding. Patient armband removed and shredded.

## 2020-06-09 NOTE — ED TRIAGE NOTES
Patient c/o chest pain with sob since Saturday. [facility] and was given ibuprofen 800mg. She states pain and SOB continues.

## 2020-06-09 NOTE — DISCHARGE INSTRUCTIONS

## 2020-06-09 NOTE — ED PROVIDER NOTES
EMERGENCY DEPARTMENT HISTORY AND PHYSICAL EXAM    Date: 6/9/2020  Patient Name: Rivas Parks    History of Presenting Illness     Chief Complaint   Patient presents with    Chest Pain    Shortness of Breath         History Provided By: Patient        Additional History (Context): Rivas Parks is a 25 y.o. female with Previous history of asthma and narcolepsy who presents with anterior midsternal chest wall pain for 5 days. Patient states [facility] similar complaint. Patient states she was diagnosed with costochondritis. Patient states EKG and some labs were performed at [facility], however, were found to be negative. Patient was discharged with ibuprofen 800 mg 3 times daily for pain. Patient states this medication is not controlling or improving her pain. She denies fever, myalgias, or chills. Patient also specifically denies a cough. PCP: Arline Ren MD    Current Outpatient Medications   Medication Sig Dispense Refill    methocarbamoL (Robaxin-750) 750 mg tablet Take 1 Tab by mouth three (3) times daily as needed for Muscle Spasm(s) or Pain. 20 Tab 0    mometasone-formoterol (DULERA) 100-5 mcg/actuation HFA inhaler Take 2 Puffs by inhalation two (2) times a day. 1 Inhaler 3    montelukast (SINGULAIR) 10 mg tablet Take 1 Tab by mouth daily. 30 Tab 6    etonogestrel (NEXPLANON SDRM) by SubDERmal route.  EPINEPHrine (EPIPEN) 0.3 mg/0.3 mL injection 0.3 mg by IntraMUSCular route once as needed.  albuterol (PROVENTIL HFA, VENTOLIN HFA, PROAIR HFA) 90 mcg/actuation inhaler Take 2 Puffs by inhalation every four (4) hours as needed for Wheezing. 1 Inhaler 0    omalizumab (XOLAIR) 150 mg solr by SubCUTAneous route once.          Past History     Past Medical History:  Past Medical History:   Diagnosis Date    Asthma     Bronchiolitis     Epilepsy ([address]     Narcolepsy     Pneumonia        Past Surgical History:  Past Surgical History:   Procedure Laterality Date    HX LOBECTOMY Right     HX LOBECTOMY      right upper lung       Family History:  Family History   Problem Relation Age of Onset    Hypertension Mother     Diabetes Father     Diabetes Maternal Grandfather     Diabetes Paternal Grandmother        Social History:  Social History     Tobacco Use    Smoking status: Never Smoker    Smokeless tobacco: Never Used   Substance Use Topics    Alcohol use: Yes     Comment: ocassionally    Drug use: No       Allergies: Allergies   Allergen Reactions    Bee Venom Protein (Honey Bee) Sneezing    Pollen Extracts Shortness of Breath    Shellfish Derived Swelling    Shellfish Derived Shortness of Breath and Swelling    Soy Shortness of Breath and Swelling    Tomato Swelling         Review of Systems   Review of Systems  Review of Systems   Constitutional: Negative for fatigue and fever. HENT: Negative for congestion. Respiratory: Negative for cough and shortness of breath. Cardiovascular: Positive for Anterior, mid sternal chest wall pain. Gastrointestinal: Negative for abdominal pain, diarrhea, nausea and vomiting. Genitourinary: Negative for difficulty urinating and dysuria. Musculoskeletal: Negative joint pain, joint swelling, recent injury. Skin: Negative for wound. Neurological: Negative for dizziness and headaches. All other systems reviewed and are negative. All Other Systems Negative  Physical Exam     Vitals:    06/09/20 1608   BP: 134/83   Pulse: 72   Resp: 16   Temp: 98 °F (36.7 °C)   SpO2: 98%     Physical Exam     Constitutional: Pt is oriented to person, place, and time. Pt appears well-developed and well-nourished. No acute distress. HENT:   Head: Normocephalic and atraumatic. Mouth/Throat: Oropharynx is clear and moist.   Eyes: Pupils are equal, round, and reactive to light. Neck: Normal range of motion. Neck supple. No tracheal deviation present. No thyromegaly present. Cardiovascular: Normal rate, regular rhythm and normal heart sounds. No murmur heard. Pulmonary/Chest: Effort normal and breath sounds normal. No respiratory distress. No wheezes or rales. Anterior chest wall with reproducible pain. Abdominal: Soft. no distension and no mass. There is no tenderness. There is no rebound and no guarding. Musculoskeletal: Normal range of motion. No edema or deformity. Neurological: Pt is alert and oriented to person, place, and time   Skin: Skin is warm and dry.    Psychiatric: Pt has a normal mood and affect;  behavior is normal. Judgment and thought content normal.           Diagnostic Study Results     Labs -     Recent Results (from the past 12 hour(s))   EKG, 12 LEAD, INITIAL    Collection Time: 06/09/20  3:25 PM   Result Value Ref Range    Ventricular Rate 67 BPM    Atrial Rate 67 BPM    P-R Interval 148 ms    QRS Duration 88 ms    Q-T Interval 384 ms    QTC Calculation (Bezet) 405 ms    Calculated P Axis 15 degrees    Calculated R Axis 47 degrees    Calculated T Axis 40 degrees    Diagnosis       Sinus rhythm with marked sinus arrhythmia  Otherwise normal ECG  When compared with ECG of 10-OCT-2018 21:58,  No significant change was found  Confirmed by Mone Yan MD, Bernie Patel ([id number]) on 6/9/2020 5:13:24 PM     EKG, 12 LEAD, INITIAL    Collection Time: 06/09/20  4:46 PM   Result Value Ref Range    Ventricular Rate 66 BPM    Atrial Rate 66 BPM    P-R Interval 160 ms    QRS Duration 88 ms    Q-T Interval 386 ms    QTC Calculation (Bezet) 404 ms    Calculated P Axis 30 degrees    Calculated R Axis 48 degrees    Calculated T Axis 47 degrees    Diagnosis       Normal sinus rhythm with sinus arrhythmia  Normal ECG  When compared with ECG of 09-JUN-2020 15:25,  No significant change was found     CBC WITH AUTOMATED DIFF    Collection Time: 06/09/20  4:56 PM   Result Value Ref Range    WBC 7.5 4.6 - 13.2 K/uL    RBC 4.63 4.20 - 5.30 M/uL    HGB 11.6 (L) 12.0 - 16.0 g/dL    HCT 37.2 35.0 - 45.0 %    MCV 80.3 74.0 - 97.0 FL    MCH 25.1 24.0 - 34.0 PG    MCHC 31.2 31.0 - 37.0 g/dL    RDW 14.9 (H) 11.6 - 14.5 %    PLATELET 650 017 - 296 K/uL    MPV 9.5 9.2 - 11.8 FL    NEUTROPHILS 51 40 - 73 %    LYMPHOCYTES 40 21 - 52 %    MONOCYTES 7 3 - 10 %    EOSINOPHILS 2 0 - 5 %    BASOPHILS 0 0 - 2 %    ABS. NEUTROPHILS 3.8 1.8 - 8.0 K/UL    ABS. LYMPHOCYTES 3.0 0.9 - 3.6 K/UL    ABS. MONOCYTES 0.5 0.05 - 1.2 K/UL    ABS. EOSINOPHILS 0.2 0.0 - 0.4 K/UL    ABS. BASOPHILS 0.0 0.0 - 0.1 K/UL    DF AUTOMATED     METABOLIC PANEL, COMPREHENSIVE    Collection Time: 06/09/20  4:56 PM   Result Value Ref Range    Sodium 137 136 - 145 mmol/L    Potassium 3.5 3.5 - 5.5 mmol/L    Chloride 106 100 - 111 mmol/L    CO2 30 21 - 32 mmol/L    Anion gap 1 (L) 3.0 - 18 mmol/L    Glucose 90 74 - 99 mg/dL    BUN 12 7.0 - 18 MG/DL    Creatinine 0.69 0.6 - 1.3 MG/DL    BUN/Creatinine ratio 17 12 - 20      GFR est AA >60 >60 ml/min/1.73m2    GFR est non-AA >60 >60 ml/min/1.73m2    Calcium 8.5 8.5 - 10.1 MG/DL    Bilirubin, total 0.2 0.2 - 1.0 MG/DL    ALT (SGPT) 17 13 - 56 U/L    AST (SGOT) 17 10 - 38 U/L    Alk. phosphatase 97 45 - 117 U/L    Protein, total 7.3 6.4 - 8.2 g/dL    Albumin 3.1 (L) 3.4 - 5.0 g/dL    Globulin 4.2 (H) 2.0 - 4.0 g/dL    A-G Ratio 0.7 (L) 0.8 - 1.7     TROPONIN I    Collection Time: 06/09/20  4:56 PM   Result Value Ref Range    Troponin-I, QT <0.02 0.0 - 0.045 NG/ML   HCG URINE, QL    Collection Time: 06/09/20  5:07 PM   Result Value Ref Range    HCG urine, QL Negative NEG         Radiologic Studies -   XR CHEST PORT   Final Result   Impression:   1. No acute process. Stable cardiomegaly. CT Results  (Last 48 hours)    None        CXR Results  (Last 48 hours)               06/09/20 1624  XR CHEST PORT Final result    Impression:  Impression:   1. No acute process. Stable cardiomegaly.            Narrative:      Examination: Portable AP chest        History: Chest pain       Comparison: October 10, 2018       Findings: Lungs are clear. The heart is moderately enlarged in size. There is no   acute osseous abnormality. Medical Decision Making   I am the first provider for this patient. I reviewed the vital signs, available nursing notes, past medical history, past surgical history, family history and social history. Vital Signs-Reviewed the patient's vital signs. Comparison:    Records Reviewed: Nursing Notes and Old Medical Records    Procedures:  Procedures    Provider Notes (Medical Decision Making):   Full cardiac work-up secondary to [facility].  Reproducible chest wall pain is reassuring, however, I felt troponin measurement should be included in a full work-up. Heart score=1. PERC score= 0. Pt denies birth control. MED RECONCILIATION:  No current facility-administered medications for this encounter. Current Outpatient Medications   Medication Sig    methocarbamoL (Robaxin-750) 750 mg tablet Take 1 Tab by mouth three (3) times daily as needed for Muscle Spasm(s) or Pain.  mometasone-formoterol (DULERA) 100-5 mcg/actuation HFA inhaler Take 2 Puffs by inhalation two (2) times a day.  montelukast (SINGULAIR) 10 mg tablet Take 1 Tab by mouth daily.  etonogestrel (Hiren Stephen) by SubDERmal route.  EPINEPHrine (EPIPEN) 0.3 mg/0.3 mL injection 0.3 mg by IntraMUSCular route once as needed.  albuterol (PROVENTIL HFA, VENTOLIN HFA, PROAIR HFA) 90 mcg/actuation inhaler Take 2 Puffs by inhalation every four (4) hours as needed for Wheezing.  omalizumab (XOLAIR) 150 mg solr by SubCUTAneous route once. Disposition:  Home    DISCHARGE NOTE:     Pt has been reexamined. Patient has no new complaints, changes, or physical findings. Care plan outlined and precautions discussed. Results of labs and exam were reviewed with the patient. All medications were reviewed with the patient; will d/c home with follow up instructions.  All of pt's questions and concerns were addressed. Patient was instructed and agrees to follow up with primary care, as well as to return to the ED upon further deterioration. Patient is ready to go home. Follow-up Information     Follow up With Specialties Details Why Contact Info    Dejan Ram MD Family Practice Schedule an appointment as soon as possible for a visit As needed [address]      [address] [facility] EMERGENCY DEPT Emergency Medicine  If symptoms worsen [address] [facility]  [phone]          Current Discharge Medication List      START taking these medications    Details   methocarbamoL (Robaxin-750) 750 mg tablet Take 1 Tab by mouth three (3) times daily as needed for Muscle Spasm(s) or Pain. Qty: 20 Tab, Refills: 0                 Diagnosis     Clinical Impression:   1.  Chest wall pain

## 2020-06-09 NOTE — LETTER
NOTIFICATION RETURN TO WORK / SCHOOL 
 
6/9/2020 6:32 PM 
 
Ms. Sylvain Tracey [address] [facility] [address] To Whom It May Concern: 
 
Sylvain Tracey is currently under the care of [id number] [facility] EMERGENCY DEPT. She will return to work/school on: 06/11/2020. If there are questions or concerns please have the patient contact our office. Sincerely, 
 
 
 
JULIO Anaya 
06/09/20

## 2020-06-10 ENCOUNTER — PATIENT OUTREACH (OUTPATIENT)
Dept: CASE MANAGEMENT | Age: 25
End: 2020-06-10

## 2020-06-10 LAB
ATRIAL RATE: 66 BPM
CALCULATED P AXIS, ECG09: 30 DEGREES
CALCULATED R AXIS, ECG10: 48 DEGREES
CALCULATED T AXIS, ECG11: 47 DEGREES
DIAGNOSIS, 93000: NORMAL
P-R INTERVAL, ECG05: 160 MS
Q-T INTERVAL, ECG07: 386 MS
QRS DURATION, ECG06: 88 MS
QTC CALCULATION (BEZET), ECG08: 404 MS
VENTRICULAR RATE, ECG03: 66 BPM

## 2020-06-10 NOTE — PROGRESS NOTES
Unable to reach    Attempt made to contact the patient for follow up post recent ED visit. The patient's voicemail is full and cannot accept any messages at this time. MSW will re-attempt to contact the patient at a later time.

## 2020-06-12 ENCOUNTER — PATIENT OUTREACH (OUTPATIENT)
Dept: CASE MANAGEMENT | Age: 25
End: 2020-06-12

## 2020-06-12 NOTE — PROGRESS NOTES
Unable to reach (2nd attempt)    2nd attempt made to contact the patient for follow up post recent ED visit. Message left of voicemail with introduction of self, role, reason for call and provided with call back information. The patient is resolved from Transitions of Care episode on 6/12/2020. No further follow up calls will be required at this time.

## 2020-08-25 ENCOUNTER — VIRTUAL VISIT (OUTPATIENT)
Dept: FAMILY MEDICINE CLINIC | Age: 25
End: 2020-08-25

## 2020-08-25 DIAGNOSIS — H00.015 HORDEOLUM EXTERNUM OF LEFT LOWER EYELID: Primary | ICD-10-CM

## 2020-08-25 RX ORDER — ERYTHROMYCIN 5 MG/G
OINTMENT OPHTHALMIC
Qty: 3.5 G | Refills: 0 | Status: SHIPPED | OUTPATIENT
Start: 2020-08-25 | End: 2020-10-19

## 2020-08-25 NOTE — PROGRESS NOTES
Montez Mandujano is a 22 y.o. female who was seen by synchronous (real-time) audio-video technology on 8/25/2020 for Stye        Assessment & Plan:   Diagnoses and all orders for this visit:    1. Hordeolum externum of left lower eyelid    Other orders  -     erythromycin (ILOTYCIN) ophthalmic ointment; Place small ribbon to left lower eyelid every 6 hours for 5 days    Stye  Warm compresses  Erythromycin ointment  Do not wear contact lenses until better  F/U worsening or unimproved 3 days  OV 3 mos or prn  I have explained plan to patient and the patient verbalizes understanding      I spent at least 15 minutes on this visit with this established patient. 712  Subjective:   3 days of (L) lower eyelid irritation w/o change in acuity, hx of FB, fever   Wears contact lenses          Prior to Admission medications    Medication Sig Start Date End Date Taking? Authorizing Provider   erythromycin (ILOTYCIN) ophthalmic ointment Place small ribbon to left lower eyelid every 6 hours for 5 days 8/25/20  Yes Nemo Marshall MD   methocarbamoL (Robaxin-750) 750 mg tablet Take 1 Tab by mouth three (3) times daily as needed for Muscle Spasm(s) or Pain. 6/9/20   Verenice Angelo, PA   montelukast (SINGULAIR) 10 mg tablet Take 1 Tab by mouth daily. 1/3/19   Josemanuel Valdez MD   mometasone-formoterol (DULERA) 100-5 mcg/actuation HFA inhaler Take 2 Puffs by inhalation two (2) times a day. 10/23/18   Josemanuel Valdez MD   etonogestrel (Davee Safe) by SubDERmal route. Provider, Historical   EPINEPHrine (EPIPEN) 0.3 mg/0.3 mL injection 0.3 mg by IntraMUSCular route once as needed. Provider, Historical   albuterol (PROVENTIL HFA, VENTOLIN HFA, PROAIR HFA) 90 mcg/actuation inhaler Take 2 Puffs by inhalation every four (4) hours as needed for Wheezing. 12/1/16   Sarah Astudillo, HELLEN   omalizumab [facility]) 150 mg solr by SubCUTAneous route once.     Other, MD Octavia     Current Outpatient Medications   Medication Sig Dispense Refill    erythromycin (ILOTYCIN) ophthalmic ointment Place small ribbon to left lower eyelid every 6 hours for 5 days 3.5 g 0    methocarbamoL (Robaxin-750) 750 mg tablet Take 1 Tab by mouth three (3) times daily as needed for Muscle Spasm(s) or Pain. 20 Tab 0    montelukast (SINGULAIR) 10 mg tablet Take 1 Tab by mouth daily. 30 Tab 6    mometasone-formoterol (DULERA) 100-5 mcg/actuation HFA inhaler Take 2 Puffs by inhalation two (2) times a day. 1 Inhaler 3    etonogestrel (NEXPLANON SDRM) by SubDERmal route.  EPINEPHrine (EPIPEN) 0.3 mg/0.3 mL injection 0.3 mg by IntraMUSCular route once as needed.  albuterol (PROVENTIL HFA, VENTOLIN HFA, PROAIR HFA) 90 mcg/actuation inhaler Take 2 Puffs by inhalation every four (4) hours as needed for Wheezing. 1 Inhaler 0    omalizumab (XOLAIR) 150 mg solr by SubCUTAneous route once. Allergies   Allergen Reactions    Bee Venom Protein (Honey Bee) Sneezing    Pollen Extracts Shortness of Breath    Shellfish Derived Swelling    Shellfish Derived Shortness of Breath and Swelling    Soy Shortness of Breath and Swelling    Tomato Swelling     Past Medical History:   Diagnosis Date    Asthma     Bronchiolitis     [address] [address]     Narcolepsy     Pneumonia      Past Surgical History:   Procedure Laterality Date    HX LOBECTOMY Right     HX LOBECTOMY      right upper lung       ROS per HPI    Objective:   No flowsheet data found.    General: alert, cooperative, no distress   Mental  status: normal mood, behavior, speech, dress, motor activity, and thought processes, able to follow commands   HENT: NCAT   Neck: no visualized mass   Resp: no respiratory distress   Neuro: no gross deficits   Skin: no discoloration or lesions of concern on visible areas   Psychiatric: normal affect, consistent with stated mood, no evidence of hallucinations     Additional exam findings: no      We discussed the expected course, resolution and complications of the diagnosis(es) in detail. Medication risks, benefits, costs, interactions, and alternatives were discussed as indicated. I advised her to contact the office if her condition worsens, changes or fails to improve as anticipated. She expressed understanding with the diagnosis(es) and plan. Iggy Hewitt, who was evaluated through a patient-initiated, synchronous (real-time) audio-video encounter, and/or her healthcare decision maker, is aware that it is a billable service, with coverage as determined by her insurance carrier. She provided verbal consent to proceed: Yes, and patient identification was verified. It was conducted pursuant to the emergency declaration under the [facility], [address] [place] [address] authority and the [employer] Act. A caregiver was present when appropriate. Ability to conduct physical exam was limited. I was at home. The patient was at home.       Patricia Johnson MD

## 2020-10-19 ENCOUNTER — HOSPITAL ENCOUNTER (EMERGENCY)
Age: 25
Discharge: HOME | End: 2020-10-19
Attending: EMERGENCY MEDICINE
Payer: COMMERCIAL

## 2020-10-19 ENCOUNTER — APPOINTMENT (OUTPATIENT)
Dept: GENERAL RADIOLOGY | Age: 25
End: 2020-10-19
Attending: PHYSICIAN ASSISTANT
Payer: COMMERCIAL

## 2020-10-19 VITALS
OXYGEN SATURATION: 100 % | RESPIRATION RATE: 22 BRPM | WEIGHT: 255 LBS | TEMPERATURE: 98.4 F | BODY MASS INDEX: 50.06 KG/M2 | HEART RATE: 88 BPM | SYSTOLIC BLOOD PRESSURE: 100 MMHG | DIASTOLIC BLOOD PRESSURE: 85 MMHG | HEIGHT: 60 IN

## 2020-10-19 DIAGNOSIS — R03.0 ELEVATED BLOOD PRESSURE READING: ICD-10-CM

## 2020-10-19 DIAGNOSIS — J45.21 MILD INTERMITTENT ASTHMA WITH ACUTE EXACERBATION: Primary | ICD-10-CM

## 2020-10-19 LAB
ANION GAP SERPL CALC-SCNC: 4 MMOL/L (ref 3–18)
ATRIAL RATE: 74 BPM
BASOPHILS # BLD: 0 K/UL (ref 0–0.1)
BASOPHILS NFR BLD: 0 % (ref 0–2)
BUN SERPL-MCNC: 10 MG/DL (ref 7–18)
BUN/CREAT SERPL: 12 (ref 12–20)
CALCIUM SERPL-MCNC: 8.5 MG/DL (ref 8.5–10.1)
CALCULATED P AXIS, ECG09: 24 DEGREES
CALCULATED R AXIS, ECG10: 57 DEGREES
CALCULATED T AXIS, ECG11: 54 DEGREES
CHLORIDE SERPL-SCNC: 107 MMOL/L (ref 100–111)
CO2 SERPL-SCNC: 28 MMOL/L (ref 21–32)
CREAT SERPL-MCNC: 0.82 MG/DL (ref 0.6–1.3)
DIAGNOSIS, 93000: NORMAL
DIFFERENTIAL METHOD BLD: ABNORMAL
EOSINOPHIL # BLD: 0.2 K/UL (ref 0–0.4)
EOSINOPHIL NFR BLD: 2 % (ref 0–5)
ERYTHROCYTE [DISTWIDTH] IN BLOOD BY AUTOMATED COUNT: 14.9 % (ref 11.6–14.5)
GLUCOSE SERPL-MCNC: 99 MG/DL (ref 74–99)
HCT VFR BLD AUTO: 38.3 % (ref 35–45)
HGB BLD-MCNC: 12.2 G/DL (ref 12–16)
LYMPHOCYTES # BLD: 4.7 K/UL (ref 0.9–3.6)
LYMPHOCYTES NFR BLD: 48 % (ref 21–52)
MCH RBC QN AUTO: 25.3 PG (ref 24–34)
MCHC RBC AUTO-ENTMCNC: 31.9 G/DL (ref 31–37)
MCV RBC AUTO: 79.3 FL (ref 74–97)
MONOCYTES # BLD: 0.5 K/UL (ref 0.05–1.2)
MONOCYTES NFR BLD: 5 % (ref 3–10)
NEUTS SEG # BLD: 4.4 K/UL (ref 1.8–8)
NEUTS SEG NFR BLD: 45 % (ref 40–73)
P-R INTERVAL, ECG05: 154 MS
PLATELET # BLD AUTO: 381 K/UL (ref 135–420)
PMV BLD AUTO: 9.9 FL (ref 9.2–11.8)
POTASSIUM SERPL-SCNC: 4 MMOL/L (ref 3.5–5.5)
Q-T INTERVAL, ECG07: 376 MS
QRS DURATION, ECG06: 84 MS
QTC CALCULATION (BEZET), ECG08: 417 MS
RBC # BLD AUTO: 4.83 M/UL (ref 4.2–5.3)
SODIUM SERPL-SCNC: 139 MMOL/L (ref 136–145)
TROPONIN I SERPL-MCNC: <0.02 NG/ML (ref 0–0.04)
VENTRICULAR RATE, ECG03: 74 BPM
WBC # BLD AUTO: 9.8 K/UL (ref 4.6–13.2)

## 2020-10-19 PROCEDURE — 94640 AIRWAY INHALATION TREATMENT: CPT

## 2020-10-19 PROCEDURE — 74011250636 HC RX REV CODE- 250/636

## 2020-10-19 PROCEDURE — 99284 EMERGENCY DEPT VISIT MOD MDM: CPT

## 2020-10-19 PROCEDURE — 93005 ELECTROCARDIOGRAM TRACING: CPT

## 2020-10-19 PROCEDURE — 74011636637 HC RX REV CODE- 636/637: Performed by: PHYSICIAN ASSISTANT

## 2020-10-19 PROCEDURE — 96372 THER/PROPH/DIAG INJ SC/IM: CPT

## 2020-10-19 PROCEDURE — 80048 BASIC METABOLIC PNL TOTAL CA: CPT

## 2020-10-19 PROCEDURE — 71045 X-RAY EXAM CHEST 1 VIEW: CPT

## 2020-10-19 PROCEDURE — 85025 COMPLETE CBC W/AUTO DIFF WBC: CPT

## 2020-10-19 PROCEDURE — 74011000250 HC RX REV CODE- 250: Performed by: PHYSICIAN ASSISTANT

## 2020-10-19 PROCEDURE — 84484 ASSAY OF TROPONIN QUANT: CPT

## 2020-10-19 RX ORDER — PREDNISONE 20 MG/1
60 TABLET ORAL
Status: COMPLETED | OUTPATIENT
Start: 2020-10-19 | End: 2020-10-19

## 2020-10-19 RX ORDER — IPRATROPIUM BROMIDE AND ALBUTEROL SULFATE 2.5; .5 MG/3ML; MG/3ML
3 SOLUTION RESPIRATORY (INHALATION)
Qty: 30 NEBULE | Refills: 0 | Status: SHIPPED | OUTPATIENT
Start: 2020-10-19

## 2020-10-19 RX ORDER — IPRATROPIUM BROMIDE AND ALBUTEROL SULFATE 2.5; .5 MG/3ML; MG/3ML
3 SOLUTION RESPIRATORY (INHALATION) ONCE
Status: COMPLETED | OUTPATIENT
Start: 2020-10-19 | End: 2020-10-19

## 2020-10-19 RX ORDER — ALBUTEROL SULFATE 1.25 MG/3ML
1.25 SOLUTION RESPIRATORY (INHALATION)
COMMUNITY

## 2020-10-19 RX ORDER — KETOROLAC TROMETHAMINE 15 MG/ML
15 INJECTION, SOLUTION INTRAMUSCULAR; INTRAVENOUS
Status: COMPLETED | OUTPATIENT
Start: 2020-10-19 | End: 2020-10-19

## 2020-10-19 RX ORDER — KETOROLAC TROMETHAMINE 15 MG/ML
INJECTION, SOLUTION INTRAMUSCULAR; INTRAVENOUS
Status: COMPLETED
Start: 2020-10-19 | End: 2020-10-19

## 2020-10-19 RX ORDER — PREDNISONE 50 MG/1
50 TABLET ORAL DAILY
Qty: 4 TAB | Refills: 0 | Status: SHIPPED | OUTPATIENT
Start: 2020-10-19 | End: 2020-10-23

## 2020-10-19 RX ORDER — KETOROLAC TROMETHAMINE 30 MG/ML
15 INJECTION, SOLUTION INTRAMUSCULAR; INTRAVENOUS
Status: DISCONTINUED | OUTPATIENT
Start: 2020-10-19 | End: 2020-10-19

## 2020-10-19 RX ADMIN — KETOROLAC TROMETHAMINE 15 MG: 15 INJECTION, SOLUTION INTRAMUSCULAR; INTRAVENOUS at 16:04

## 2020-10-19 RX ADMIN — PREDNISONE 60 MG: 20 TABLET ORAL at 14:48

## 2020-10-19 RX ADMIN — IPRATROPIUM BROMIDE AND ALBUTEROL SULFATE 3 ML: .5; 3 SOLUTION RESPIRATORY (INHALATION) at 15:06

## 2020-10-19 RX ADMIN — IPRATROPIUM BROMIDE AND ALBUTEROL SULFATE 3 ML: .5; 3 SOLUTION RESPIRATORY (INHALATION) at 14:48

## 2020-10-19 NOTE — ED PROVIDER NOTES
EMERGENCY DEPARTMENT HISTORY AND PHYSICAL EXAM    2:23 PM      Date: 10/19/2020  Patient Name: Yeisca Bartholomew    History of Presenting Illness     Chief Complaint   Patient presents with    Cough    Chest Pain    Shortness of Breath    Back Pain     History Provided By: Patient    Additional History (Context): Yesica Bartholomew is a 22 y.o. female with hx of asthma, narcolepsy, epilepsy who presents with complaint of dry cough, shortness of breath, diffuse chest tightness, and wheezing x1 day. Patient notes symptoms are typical of asthma exacerbations in the past.  Denies fever or chills, diaphoresis, pleuritic or exertional symptoms, n/v.  Denies sick contacts, known exposure to [employer]. Denies history of cardiac disease, family history of cardiac disease, history of DVT or PE, hemoptysis, recent surgery or travel, smoking history. Denies history of admissions or intubations for asthma in the past.    PCP: Suzan Kingsley MD    Current Outpatient Medications   Medication Sig Dispense Refill    albuterol (ACCUNEB) 1.25 mg/3 mL nebu 1.25 mg by Nebulization route every six (6) hours as needed for Wheezing.  predniSONE (DELTASONE) 50 mg tablet Take 1 Tab by mouth daily for 4 days. 4 Tab 0    albuterol-ipratropium (DUO-NEB) 2.5 mg-0.5 mg/3 ml nebu 3 mL by Nebulization route every four (4) hours as needed for Wheezing. 30 Nebule 0    montelukast (SINGULAIR) 10 mg tablet Take 1 Tab by mouth daily. 30 Tab 6    mometasone-formoterol (DULERA) 100-5 mcg/actuation HFA inhaler Take 2 Puffs by inhalation two (2) times a day. 1 Inhaler 3    EPINEPHrine (EPIPEN) 0.3 mg/0.3 mL injection 0.3 mg by IntraMUSCular route once as needed.  albuterol (PROVENTIL HFA, VENTOLIN HFA, PROAIR HFA) 90 mcg/actuation inhaler Take 2 Puffs by inhalation every four (4) hours as needed for Wheezing. 1 Inhaler 0    omalizumab (XOLAIR) 150 mg solr by SubCUTAneous route once.          Past History     Past Medical History:  Past Medical History:   Diagnosis Date    Asthma     Bronchiolitis     Epilepsy ([facility] [address]     Narcolepsy     Pneumonia        Past Surgical History:  Past Surgical History:   Procedure Laterality Date    HX LOBECTOMY Right     HX LOBECTOMY      right upper lung       Family History:  Family History   Problem Relation Age of Onset    Hypertension Mother     Diabetes Father     Diabetes Maternal Grandfather     Diabetes Paternal Grandmother        Social History:  Social History     Tobacco Use    Smoking status: Never Smoker    Smokeless tobacco: Never Used   Substance Use Topics    Alcohol use: Yes     Comment: ocassionally    Drug use: No       Allergies: Allergies   Allergen Reactions    Bee Venom Protein (Honey Bee) Sneezing    Pollen Extracts Shortness of Breath    Shellfish Derived Swelling    Shellfish Derived Shortness of Breath and Swelling    Soy Shortness of Breath and Swelling    Tomato Swelling         Review of Systems       Review of Systems   Constitutional: Negative for chills and fever. Respiratory: Positive for cough, chest tightness, shortness of breath and wheezing. Cardiovascular: Negative for chest pain. Gastrointestinal: Negative for abdominal pain, nausea and vomiting. Skin: Negative for rash. Neurological: Negative for weakness. All other systems reviewed and are negative. Physical Exam     Visit Vitals  /85   Pulse 88   Temp 98.4 °F (36.9 °C)   Resp 22   Ht 5' (1.524 m)   Wt 115.7 kg (255 lb)   LMP 09/24/2020 (Approximate)   SpO2 100%   BMI 49.80 kg/m²         Physical Exam  Vitals signs and nursing note reviewed. Constitutional:       General: She is not in acute distress. Appearance: She is well-developed. She is not ill-appearing or diaphoretic. HENT:      Head: Normocephalic and atraumatic. Neck:      Musculoskeletal: Normal range of motion and neck supple. Cardiovascular:      Rate and Rhythm: Normal rate and regular rhythm.       Heart sounds: Normal heart sounds. No murmur. No friction rub. No gallop. Pulmonary:      Effort: Pulmonary effort is normal. No tachypnea, accessory muscle usage or respiratory distress. Breath sounds: No stridor. Wheezing (lower lung fields, mild) present. No rales. Abdominal:      Palpations: Abdomen is soft. Musculoskeletal: Normal range of motion. Right lower leg: No edema. Left lower leg: No edema. Skin:     General: Skin is warm. Findings: No rash. Neurological:      Mental Status: She is alert. Diagnostic Study Results     Labs -  Recent Results (from the past 12 hour(s))   EKG, 12 LEAD, INITIAL    Collection Time: 10/19/20  3:19 PM   Result Value Ref Range    Ventricular Rate 74 BPM    Atrial Rate 74 BPM    P-R Interval 154 ms    QRS Duration 84 ms    Q-T Interval 376 ms    QTC Calculation (Bezet) 417 ms    Calculated P Axis 24 degrees    Calculated R Axis 57 degrees    Calculated T Axis 54 degrees    Diagnosis       Normal sinus rhythm  Normal ECG  When compared with ECG of 09-JUN-2020 16:46,  No significant change was found  Confirmed by Bossman Pathak MD, ----- ([id number]) on 10/19/2020 4:04:14 PM     CBC WITH AUTOMATED DIFF    Collection Time: 10/19/20  3:28 PM   Result Value Ref Range    WBC 9.8 4.6 - 13.2 K/uL    RBC 4.83 4.20 - 5.30 M/uL    HGB 12.2 12.0 - 16.0 g/dL    HCT 38.3 35.0 - 45.0 %    MCV 79.3 74.0 - 97.0 FL    MCH 25.3 24.0 - 34.0 PG    MCHC 31.9 31.0 - 37.0 g/dL    RDW 14.9 (H) 11.6 - 14.5 %    PLATELET 710 005 - 618 K/uL    MPV 9.9 9.2 - 11.8 FL    NEUTROPHILS 45 40 - 73 %    LYMPHOCYTES 48 21 - 52 %    MONOCYTES 5 3 - 10 %    EOSINOPHILS 2 0 - 5 %    BASOPHILS 0 0 - 2 %    ABS. NEUTROPHILS 4.4 1.8 - 8.0 K/UL    ABS. LYMPHOCYTES 4.7 (H) 0.9 - 3.6 K/UL    ABS. MONOCYTES 0.5 0.05 - 1.2 K/UL    ABS. EOSINOPHILS 0.2 0.0 - 0.4 K/UL    ABS.  BASOPHILS 0.0 0.0 - 0.1 K/UL    DF AUTOMATED     METABOLIC PANEL, BASIC    Collection Time: 10/19/20  3:28 PM   Result Value Ref Range    Sodium 139 136 - 145 mmol/L    Potassium 4.0 3.5 - 5.5 mmol/L    Chloride 107 100 - 111 mmol/L    CO2 28 21 - 32 mmol/L    Anion gap 4 3.0 - 18 mmol/L    Glucose 99 74 - 99 mg/dL    BUN 10 7.0 - 18 MG/DL    Creatinine 0.82 0.6 - 1.3 MG/DL    BUN/Creatinine ratio 12 12 - 20      GFR est AA >60 >60 ml/min/1.73m2    GFR est non-AA >60 >60 ml/min/1.73m2    Calcium 8.5 8.5 - 10.1 MG/DL   TROPONIN I    Collection Time: 10/19/20  3:28 PM   Result Value Ref Range    Troponin-I, QT <0.02 0.0 - 0.045 NG/ML       Radiologic Studies -   XR CHEST PORT   Final Result   Impression:   1. No acute cardiopulmonary process. EKG: Rate 74, normal sinus rhythm, no evidence of ischemia     Medical Decision Making   I am the first provider for this patient. I reviewed the vital signs, available nursing notes, past medical history, past surgical history, family history and social history. Vital Signs-Reviewed the patient's vital signs. Records Reviewed: Nursing Notes and Old Medical Records (Time of Review: 2:23 PM)    ED Course: Progress Notes, Reevaluation, and Consults:  3:15 PM: Pt notes persistent diffuse chest pressure. Lungs CTAB, wheezing resolved. Will order EKG, labs, and reassess. 4:20 PM: Reviewed results with patient. Resting comfortably, no distress. Discussed need for close outpatient follow-up this week for reassessment. Discussed strict return precautions, including fever, chest pain, shortness of breath, or any other medical concerns. One or more blood pressure readings were noted elevated during the patient's presentation in the emergency department today. This abnormal reading has been cited in the patients' diagnosis, and they have been encouraged to follow up with their primary care physician, or referred to a consultant for further evaluation and treatment.     Provider Notes (Medical Decision Making): 20-year-old female with history of asthma who presents to the ED due to dry cough, wheezing, and shortness of breath x1 day. Afebrile, nontoxic-appearing, looks well, 99% on room air. EKG normal sinus rhythm. Labs essentially unremarkable, chest x-ray without acute process. Do not feel further labs or imaging are warranted. Do not suspect ACS, PE, or aortic pathology. Stable for discharge with symptomatic management, close outpatient follow-up, and strict return precautions for any new or worsening symptoms. Diagnosis     Clinical Impression:   1. Mild intermittent asthma with acute exacerbation    2. Elevated blood pressure reading        Disposition: home     Follow-up Information     Follow up With Specialties Details Why [place] [id number] EMERGENCY DEPT Emergency Medicine  If symptoms worsen [address] [facility]    Primo Grullon MD Family Medicine In 2 days  [address] [facility]  [address] [address]  [phone]      Nina Tran DO Pulmonary Disease, Critical Care Medicine Schedule an appointment as soon as possible for a visit  [facility] [address]  [address] [facility]  [phone]             Discharge Medication List as of 10/19/2020  4:07 PM      START taking these medications    Details   predniSONE (DELTASONE) 50 mg tablet Take 1 Tab by mouth daily for 4 days. , Normal, Disp-4 Tab,R-0      albuterol-ipratropium (DUO-NEB) 2.5 mg-0.5 mg/3 ml nebu 3 mL by Nebulization route every four (4) hours as needed for Wheezing., Normal, Disp-30 Nebule,R-0         CONTINUE these medications which have NOT CHANGED    Details   albuterol (ACCUNEB) 1.25 mg/3 mL nebu 1.25 mg by Nebulization route every six (6) hours as needed for Wheezing., Historical Med      montelukast (SINGULAIR) 10 mg tablet Take 1 Tab by mouth daily. , Normal, Disp-30 Tab, R-6      mometasone-formoterol (DULERA) 100-5 mcg/actuation HFA inhaler Take 2 Puffs by inhalation two (2) times a day., Normal, Disp-1 Inhaler, R-3      EPINEPHrine (EPIPEN) 0.3 mg/0.3 mL injection 0.3 mg by IntraMUSCular route once as needed., Historical Med      albuterol (PROVENTIL HFA, VENTOLIN HFA, PROAIR HFA) 90 mcg/actuation inhaler Take 2 Puffs by inhalation every four (4) hours as needed for Wheezing., Print, Disp-1 Inhaler, R-0      omalizumab (XOLAIR) 150 mg solr by SubCUTAneous route once., Historical Med             Dictation disclaimer:  Please note that this dictation was completed with [email or web address], the [employer] voice recognition software. Quite often unanticipated grammatical, syntax, homophones, and other interpretive errors are inadvertently transcribed by the computer software. Please disregard these errors. Please excuse any errors that have escaped final proofreading.

## 2020-10-19 NOTE — DISCHARGE INSTRUCTIONS
Patient Education   Take medication as prescribed. Follow-up with your pulmonologist within 2 days for reassessment. Bring the results from this visit with you for their review. Return to the ED immediately for any new, worsening, or persistent symptoms, including fever, shortness of breath, or any other medical concerns. Patient Education        Elevated Blood Pressure: Care Instructions  Your Care Instructions    Blood pressure is a measure of how hard the blood pushes against the walls of your arteries. It's normal for blood pressure to go up and down throughout the day. But if it stays up over time, you have high blood pressure. Two numbers tell you your blood pressure. The first number is the systolic pressure. It shows how hard the blood pushes when your heart is pumping. The second number is the diastolic pressure. It shows how hard the blood pushes between heartbeats, when your heart is relaxed and filling with blood. An ideal blood pressure in adults is less than 120/80 (say \"120 over 80\"). High blood pressure is 140/90 or higher. You have high blood pressure if your top number is 140 or higher or your bottom number is 90 or higher, or both. The main test for high blood pressure is simple, fast, and painless. To diagnose high blood pressure, your doctor will test your blood pressure at different times. After testing your blood pressure, your doctor may ask you to test it again when you are home. If you are diagnosed with high blood pressure, you can work with your doctor to make a long-term plan to manage it. Follow-up care is a key part of your treatment and safety. Be sure to make and go to all appointments, and call your doctor if you are having problems. It's also a good idea to know your test results and keep a list of the medicines you take. How can you care for yourself at home? · Do not smoke. Smoking increases your risk for heart attack and stroke.  If you need help quitting, talk to your doctor about stop-smoking programs and medicines. These can increase your chances of quitting for good. · Stay at a healthy weight. · Try to limit how much sodium you eat to less than 2,300 milligrams (mg) a day. Your doctor may ask you to try to eat less than 1,500 mg a day. · Be physically active. Get at least 30 minutes of exercise on most days of the week. Walking is a good choice. You also may want to do other activities, such as running, swimming, cycling, or playing tennis or team sports. · Avoid or limit alcohol. Talk to your doctor about whether you can drink any alcohol. · Eat plenty of fruits, vegetables, and low-fat dairy products. Eat less saturated and total fats. · Learn how to check your blood pressure at home. When should you call for help? Call your doctor now or seek immediate medical care if:  ? · Your blood pressure is much higher than normal (such as 180/110 or higher). ? · You think high blood pressure is causing symptoms such as:  ¨ Severe headache. ¨ Blurry vision. ? Watch closely for changes in your health, and be sure to contact your doctor if:  ? · You do not get better as expected. Where can you learn more? Go to http://www.gray.com/. Enter Q931 in the search box to learn more about \"Elevated Blood Pressure: Care Instructions. \"  Current as of: September 21, 2016  Content Version: 11.4  © 4372-9537 [employer]. Care instructions adapted under license by [employer] (which disclaims liability or warranty for this information). If you have questions about a medical condition or this instruction, always ask your healthcare professional. [address] any warranty or liability for your use of this information. Learning About Asthma Triggers  What are triggers? When you have asthma, certain things can make your symptoms worse. These are called triggers.  They include:  · Cigarette smoke or air pollution. · Things you are allergic to, such as:  ¨ Pollen, mold, or dust mites. ¨ Pet hair, skin, or saliva. · Illnesses, like colds, flu, or pneumonia. · Exercise. · Dry, cold air. How do triggers affect asthma? Triggers can make it harder for your lungs to work as they should and can lead to sudden difficulty breathing and other symptoms. When you are around a trigger, an asthma attack is more likely. If your symptoms are severe, you may need emergency treatment or have to go to the hospital for treatment. If you know what your triggers are and can avoid them, you may be able to prevent asthma attacks, reduce how often you have them, and make them less severe. What can you do to avoid triggers? The first thing is to know your triggers. When you are having symptoms, note the things around you that might be causing them. Then look for patterns in what may be triggering your symptoms. Record your triggers on a piece of paper or in an asthma diary. When you have your list of possible triggers, work with your doctor to find ways to avoid them. You also can check how well your lungs are working by measuring your peak expiratory flow (PEF) throughout the day. Your PEF may drop when you are near things that trigger symptoms. Here are some ways to avoid a few common triggers. · Do not smoke or allow others to smoke around you. If you need help quitting, talk to your doctor about stop-smoking programs and medicines. These can increase your chances of quitting for good. · If there is a lot of pollution, pollen, or dust outside, stay at home and keep your windows closed. Use an air conditioner or air filter in your home. Check your local weather report or newspaper for air quality and pollen reports. · Get a flu shot every year. Talk to your doctor about getting a pneumococcal shot. Wash your hands often to prevent infections. · Avoid exercising outdoors in cold weather.  If you are outdoors in cold weather, wear a scarf around your face and breathe through your nose. How can you manage an asthma attack? · If you have an asthma action plan, follow the plan. In general:  ¨ Use your quick-relief inhaler as directed by your doctor. If your symptoms do not get better after you use your medicine, have someone take you to the emergency room. Call an ambulance if needed. ¨ If your doctor has given you other inhaled medicines or steroid pills, take them as directed. Where can you learn more? Go to [employer]  Enter M564 in the search box to learn more about \"Learning About Asthma Triggers. \"   © 8959-8380 Healthwise, [employer]. Care instructions adapted under license by [employer] (which disclaims liability or warranty for this information). This care instruction is for use with your licensed healthcare professional. If you have questions about a medical condition or this instruction, always ask your healthcare professional. [place] [address] any warranty or liability for your use of this information. Content Version: 48.7.356182; Last Revised: February 23, 2012                 Patient Education        Asthma Attack: Care Instructions  Your Care Instructions     During an asthma attack, the airways swell and narrow. This makes it hard to breathe. Severe asthma attacks can be life-threatening, but you can help prevent them by keeping your asthma under control and treating symptoms before they get bad. Symptoms include being short of breath, having chest tightness, coughing, and wheezing. Noting and treating these symptoms can also help you avoid future trips to the emergency room. The doctor has checked you carefully, but problems can develop later. If you notice any problems or new symptoms, get medical treatment right away. Follow-up care is a key part of your treatment and safety.  Be sure to make and go to all appointments, and call your doctor if you are having problems. It's also a good idea to know your test results and keep a list of the medicines you take. How can you care for yourself at home? · Follow your asthma action plan to prevent and treat attacks. If you don't have an asthma action plan, work with your doctor to create one. · Take your asthma medicines exactly as prescribed. Talk to your doctor right away if you have any questions about how to take them. ? Use your quick-relief medicine when you have symptoms of an attack. Quick-relief medicine is usually an albuterol inhaler. Some people need to use quick-relief medicine before they exercise. ? Take your controller medicine every day, not just when you have symptoms. Controller medicine is usually an inhaled corticosteroid. The goal is to prevent problems before they occur. Don't use your controller medicine to treat an attack that has already started. It doesn't work fast enough to help. ? If your doctor prescribed corticosteroid pills to use during an attack, take them exactly as prescribed. It may take hours for the pills to work, but they may make the episode shorter and help you breathe better. ? Keep your quick-relief medicine with you at all times. · Talk to your doctor before using other medicines. Some medicines, such as aspirin, can cause asthma attacks in some people. · If you have a peak flow meter, use it to check how well you are breathing. This can help you predict when an asthma attack is going to occur. Then you can take medicine to prevent the asthma attack or make it less severe. · Do not smoke or allow others to smoke around you. Avoid smoky places. Smoking makes asthma worse. If you need help quitting, talk to your doctor about stop-smoking programs and medicines. These can increase your chances of quitting for good. · Learn what triggers an asthma attack for you, and avoid the triggers when you can.  Common triggers include colds, smoke, air pollution, dust, pollen, mold, pets, cockroaches, stress, and cold air. · Avoid colds and the flu. Get a pneumococcal vaccine shot. If you have had one before, ask your doctor if you need a second dose. Get a flu vaccine every fall. If you must be around people with colds or the flu, wash your hands often. When should you call for help? Call 911 anytime you think you may need emergency care. For example, call if:    · You have severe trouble breathing. Call your doctor now or seek immediate medical care if:    · Your symptoms do not get better after you have followed your asthma action plan.     · You have new or worse trouble breathing.     · Your coughing and wheezing get worse.     · You cough up dark brown or bloody mucus (sputum).     · You have a new or higher fever. Watch closely for changes in your health, and be sure to contact your doctor if:    · You need to use quick-relief medicine on more than 2 days a week (unless it is just for exercise).     · You cough more deeply or more often, especially if you notice more mucus or a change in the color of your mucus.     · You are not getting better as expected. Where can you learn more? Go to http://www.gray.com/  Enter L130 in the search box to learn more about \"Asthma Attack: Care Instructions. \"  Current as of: February 24, 2020               Content Version: 12.6  © 6717-7129 [employer]. Care instructions adapted under license by [employer] (which disclaims liability or warranty for this information). If you have questions about a medical condition or this instruction, always ask your healthcare professional. [place] [address] any warranty or liability for your use of this information.

## 2020-10-19 NOTE — LETTER
[address] [place] [address] Dr [facility] EMERGENCY DEPT 
[address] [facility] [address] [phone] Work/School Note Date: 10/19/2020 To Whom It May concern: 
 
Lauren Gorman was seen and treated today in the emergency room by the following provider(s): 
Attending Provider: Arianne Rivas DO Physician Assistant: Dee Anaya. Lauren Gorman may return to work on 10/24/20.  
 
Sincerely, 
 
 
 
 
JULIO Sahni

## 2020-10-19 NOTE — ED TRIAGE NOTES
Pt here for evaluation of chest & back pain x 2 days. States yesterday she began experiencing dry cough. H/o asthma and previous right partial lobectomy 7/2013. Denies any known fevers; states \"I've felt hot all day yesterday and today\". Denies any known COVID exposures.

## 2020-10-20 ENCOUNTER — PATIENT OUTREACH (OUTPATIENT)
Dept: CASE MANAGEMENT | Age: 25
End: 2020-10-20

## 2020-10-20 NOTE — PROGRESS NOTES
Patient contacted regarding recent discharge and COVID-19 risk. Discussed COVID-19 related testing which was not done at this time. Test results were not done. Patient informed of results, if available? n/a    Care Transition Nurse/ Ambulatory Care Manager/ LPN Care Coordinator contacted the patient by telephone to perform post discharge assessment. Verified name and  with patient as identifiers. Patient has following risk factors of: asthma. CTN/ACM/LPN reviewed discharge instructions, medical action plan and red flags related to discharge diagnosis. Reviewed and educated them on any new and changed medications related to discharge diagnosis. Advised obtaining a 90-day supply of all daily and as-needed medications. Advance Care Planning:   Does patient have an Advance Directive: health care decision maker on file    Education provided regarding infection prevention, and signs and symptoms of COVID-19 and when to seek medical attention with patient who verbalized understanding. Discussed exposure protocols and quarantine from [address] you at higher risk for severe illness  and given an opportunity for questions and concerns. The patient agrees to contact the COVID-19 hotline [phone] or PCP office for questions related to their healthcare. CTN/ACM/LPN provided contact information for future reference. From CDC: Are you at higher risk for severe illness?  Wash your hands often.  Avoid close contact (6 feet, which is about two arm lengths) with people who are sick.  Put distance between yourself and other people if COVID-19 is spreading in your community.  Clean and disinfect frequently touched surfaces.  Avoid all cruise travel and non-essential air travel.  Call your healthcare professional if you have concerns about COVID-19 and your underlying condition or if you are sick.     For more information on steps you can take to protect yourself, see CDC's How to Protect Yourself      Patient/family/caregiver given information for [facility] Loop and agrees to enroll no  Patient's preferred e-mail:  n/a  Patient's preferred phone number: n/a  Based on Loop alert triggers, patient will be contacted by nurse care manager for worsening symptoms. Plan for follow-up call in 7-14 days based on severity of symptoms and risk factors.

## 2020-10-21 ENCOUNTER — TELEPHONE (OUTPATIENT)
Dept: FAMILY MEDICINE CLINIC | Age: 25
End: 2020-10-21

## 2020-10-21 ENCOUNTER — VIRTUAL VISIT (OUTPATIENT)
Dept: FAMILY MEDICINE CLINIC | Age: 25
End: 2020-10-21
Payer: COMMERCIAL

## 2020-10-21 DIAGNOSIS — J45.40 MODERATE PERSISTENT ASTHMA WITHOUT COMPLICATION: Primary | ICD-10-CM

## 2020-10-21 DIAGNOSIS — K21.00 GASTROESOPHAGEAL REFLUX DISEASE WITH ESOPHAGITIS WITHOUT HEMORRHAGE: ICD-10-CM

## 2020-10-21 PROCEDURE — 99214 OFFICE O/P EST MOD 30 MIN: CPT | Performed by: NURSE PRACTITIONER

## 2020-10-21 RX ORDER — PANTOPRAZOLE SODIUM 40 MG/1
40 TABLET, DELAYED RELEASE ORAL DAILY
Qty: 30 TAB | Refills: 5 | Status: SHIPPED | OUTPATIENT
Start: 2020-10-21 | End: 2021-03-08

## 2020-10-21 RX ORDER — MOMETASONE FUROATE AND FORMOTEROL FUMARATE DIHYDRATE 200; 5 UG/1; UG/1
2 AEROSOL RESPIRATORY (INHALATION) 2 TIMES DAILY
Qty: 1 INHALER | Refills: 5 | Status: SHIPPED | OUTPATIENT
Start: 2020-10-21 | End: 2020-11-17 | Stop reason: ALTCHOICE

## 2020-10-21 NOTE — TELEPHONE ENCOUNTER
Pt had a virtual visit this morning and was told she could lower her mask below her nose for a few minutes throughout the day but pt states she will need a note stating that for her employer.

## 2020-10-21 NOTE — TELEPHONE ENCOUNTER
Isacc Menjivar, NP  You 11 minutes ago (1:41 PM)       Legally, no I can not write that note. Message text        Attempted to contact the patient however the mailbox is full. Unable to leave voicemail. Will attempt later.

## 2020-10-21 NOTE — PROGRESS NOTES
Berry Argueta is a 22 y.o. female who was seen by synchronous (real-time) audio-video technology on 10/21/2020 for follow up ED    I am working at home . Patient is at home. Subjective:     Patient states she went to the ED again for the same thing. .. shortness of breath, chest pain. They treated her once, 3 months ago for costalchonritis. This time for asthma. The chest pain is in the center of her chest. It is worse at night. She sees an allergist. She use to be on a higher dose of her Dulera.           Discharge Medication List as of 10/19/2020  4:07 PM           START taking these medications     Details   predniSONE (DELTASONE) 50 mg tablet Take 1 Tab by mouth daily for 4 days. , Normal, Disp-4 Tab,R-0       albuterol-ipratropium (DUO-NEB) 2.5 mg-0.5 mg/3 ml nebu 3 mL by Nebulization route every four (4) hours as needed for Wheezing., Normal, Disp-30 Nebule,R-0               CONTINUE these medications which have NOT CHANGED     Details   albuterol (ACCUNEB) 1.25 mg/3 mL nebu 1.25 mg by Nebulization route every six (6) hours as needed for Wheezing., Historical Med       montelukast (SINGULAIR) 10 mg tablet Take 1 Tab by mouth daily. , Normal, Disp-30 Tab, R-6       mometasone-formoterol (DULERA) 100-5 mcg/actuation HFA inhaler Take 2 Puffs by inhalation two (2) times a day., Normal, Disp-1 Inhaler, R-3       EPINEPHrine (EPIPEN) 0.3 mg/0.3 mL injection 0.3 mg by IntraMUSCular route once as needed., Historical Med       albuterol (PROVENTIL HFA, VENTOLIN HFA, PROAIR HFA) 90 mcg/actuation inhaler Take 2 Puffs by inhalation every four (4) hours as needed for Wheezing., Print, Disp-1 Inhaler, R-0       omalizumab (XOLAIR) 150 mg solr by SubCUTAneous route once., Historical Med             Prior to Admission medications    Medication Sig Start Date End Date Taking? Authorizing Provider   albuterol (ACCUNEB) 1.25 mg/3 mL nebu 1.25 mg by Nebulization route every six (6) hours as needed for Wheezing.     Other, MD Octavia   predniSONE (DELTASONE) 50 mg tablet Take 1 Tab by mouth daily for 4 days. 10/19/20 10/23/20  JULIO Laguna   albuterol-ipratropium (DUO-NEB) 2.5 mg-0.5 mg/3 ml nebu 3 mL by Nebulization route every four (4) hours as needed for Wheezing. 10/19/20   JULIO Laguna   montelukast (SINGULAIR) 10 mg tablet Take 1 Tab by mouth daily. 1/3/19   Levy Osullivan MD   mometasone-formoterol (DULERA) 100-5 mcg/actuation HFA inhaler Take 2 Puffs by inhalation two (2) times a day. 10/23/18   Levy Osullivan MD   EPINEPHrine (EPIPEN) 0.3 mg/0.3 mL injection 0.3 mg by IntraMUSCular route once as needed. Provider, Historical   albuterol (PROVENTIL HFA, VENTOLIN HFA, PROAIR HFA) 90 mcg/actuation inhaler Take 2 Puffs by inhalation every four (4) hours as needed for Wheezing. 12/1/16   Niko Young NP   omalizumab Katherine Bowl) 150 mg solr by SubCUTAneous route once. Other, MD Octavia     Patient Active Problem List    Diagnosis Date Noted    Obesity, morbid ([facility] [address] 06/26/2018    Asthma     Narcolepsy     Bronchiolitis     Chronic allergic rhinitis due to pollen 01/25/2018    Gastroesophageal reflux disease 01/25/2018    Moderate persistent asthma without complication 06/19/3165     Current Outpatient Medications   Medication Sig Dispense Refill    mometasone-formoterol (Dulera) 200-5 mcg/actuation HFA inhaler Take 2 Puffs by inhalation two (2) times a day. 1 Inhaler 5    pantoprazole (PROTONIX) 40 mg tablet Take 1 Tab by mouth daily. 30 Tab 5    albuterol (ACCUNEB) 1.25 mg/3 mL nebu 1.25 mg by Nebulization route every six (6) hours as needed for Wheezing.  predniSONE (DELTASONE) 50 mg tablet Take 1 Tab by mouth daily for 4 days. 4 Tab 0    albuterol-ipratropium (DUO-NEB) 2.5 mg-0.5 mg/3 ml nebu 3 mL by Nebulization route every four (4) hours as needed for Wheezing. 30 Nebule 0    montelukast (SINGULAIR) 10 mg tablet Take 1 Tab by mouth daily.  30 Tab 6    EPINEPHrine (EPIPEN) 0.3 mg/0.3 mL injection 0.3 mg by IntraMUSCular route once as needed.  albuterol (PROVENTIL HFA, VENTOLIN HFA, PROAIR HFA) 90 mcg/actuation inhaler Take 2 Puffs by inhalation every four (4) hours as needed for Wheezing. 1 Inhaler 0    omalizumab (XOLAIR) 150 mg solr by SubCUTAneous route once. Allergies   Allergen Reactions    Bee Venom Protein (Honey Bee) Sneezing    Pollen Extracts Shortness of Breath    Shellfish Derived Swelling    Shellfish Derived Shortness of Breath and Swelling    Soy Shortness of Breath and Swelling    Tomato Swelling     Past Medical History:   Diagnosis Date    Asthma     Bronchiolitis     Epilepsy ([facility] [address]     Narcolepsy     Pneumonia      Past Surgical History:   Procedure Laterality Date    HX LOBECTOMY Right     HX LOBECTOMY      right upper lung     Family History   Problem Relation Age of Onset    Hypertension Mother     Diabetes Father     Diabetes Maternal Grandfather     Diabetes Paternal Grandmother      Social History     Tobacco Use    Smoking status: Never Smoker    Smokeless tobacco: Never Used   Substance Use Topics    Alcohol use: Yes     Comment: ocassionally       Review of Systems   Constitutional: Negative. HENT: Negative. Respiratory: Positive for shortness of breath. Cardiovascular: Positive for chest pain (center. ). Gastrointestinal: Negative. Genitourinary: Negative. Objective:   No flowsheet data found.      [INSTRUCTIONS:  \"[x]\" Indicates a positive item  \"[]\" Indicates a negative item  -- DELETE ALL ITEMS NOT EXAMINED]    Constitutional: [x] Appears well-developed and well-nourished [x] No apparent distress          Mental status: [x] Alert and awake  [x] Oriented to person/place/time [x] Able to follow commands        Eyes:   EOM    [x]  Normal      Sclera  [x]  Normal              Discharge [x]  None visible       HENT: [x] Normocephalic, atraumatic         Pulmonary/Chest: [x] Respiratory effort normal   [x] No visualized signs of difficulty breathing or respiratory distress             Musculoskeletal:           [x] Normal range of motion of neck      Neurological:        [x] No Facial Asymmetry (Cranial nerve 7 motor function) (limited exam due to video visit)          [x] No gaze palsy              Psychiatric:       [x] Normal Affect        [x] No Hallucinations    Diagnoses and all orders for this visit:    Moderate persistent asthma without complication  -     mometasone-formoterol (Dulera) 200-5 mcg/actuation HFA inhaler; Take 2 Puffs by inhalation two (2) times a day., Normal, Disp-1 Inhaler,R-5    Gastroesophageal reflux disease with esophagitis without hemorrhage  -     pantoprazole (PROTONIX) 40 mg tablet; Take 1 Tab by mouth daily. , Normal, Disp-30 Tab,R-5      PLAN:  I reviewed her ED visit notes, CXR and EKG as well as labs. I asked Pt to increase her Dulera from 100/5mcg  To 200/5mcg and reminded her to rinse her mouth out after use to prevent thrush. We discussed GERD as a possible cause for her chest symptoms. Pt was asked to take Protonix and see if this will resolve her issues. If not she is to call the office. I have discussed the diagnosis with the patient and the intended plan as seen in the above orders. The patient has received an after-visit summary and questions were answered concerning future plans. I have discussed medication side effects and warnings with the patient as well. Patient will call for further questions. Follow-up and Dispositions    · Return if symptoms worsen or fail to improve. We discussed the expected course, resolution and complications of the diagnosis(es) in detail. Medication risks, benefits, costs, interactions, and alternatives were discussed as indicated. I advised her to contact the office if her condition worsens, changes or fails to improve as anticipated. She expressed understanding with the diagnosis(es) and plan.        Bird Barrera, who was evaluated through a patient-initiated, synchronous (real-time) audio-video encounter, and/or her healthcare decision maker, is aware that it is a billable service, with coverage as determined by her insurance carrier. She provided verbal consent to proceed: Yes, and patient identification was verified. It was conducted pursuant to the emergency declaration under the [address] [place] [address] and the [employer] General Act. A caregiver was present when appropriate. Ability to conduct physical exam was limited. I was at home. The patient was at home.       Lefty Quinonez NP

## 2020-10-22 ENCOUNTER — TELEPHONE (OUTPATIENT)
Dept: FAMILY MEDICINE CLINIC | Age: 25
End: 2020-10-22

## 2020-10-22 NOTE — TELEPHONE ENCOUNTER
Patient called to find out status of letter request for wearing masking accommodations. Patient requesting call from nurse.

## 2020-10-22 NOTE — TELEPHONE ENCOUNTER
Humberto Goodpasture, NP   to Me            10/21/20 1:41 PM   Legally, no I can not write that note.

## 2020-10-22 NOTE — TELEPHONE ENCOUNTER
Sonny Bradley (Das: [id number]) - [phone]  Magdaline Sails 200-5MCG/ACT aerosol  Status: Sent to Plan  Created: October 21st, 2020 [phone]  Sent: October 22nd, 2020

## 2020-11-03 ENCOUNTER — PATIENT OUTREACH (OUTPATIENT)
Dept: CASE MANAGEMENT | Age: 25
End: 2020-11-03

## 2020-11-03 NOTE — PROGRESS NOTES
Date/Time:  11/3/2020 10:54 AM   Call within 2 business days of discharge: Yes   Attempted to reach patient by telephone. Left HIPPA compliant message requesting a return call. This episode is resolved.

## 2020-11-12 ENCOUNTER — TELEPHONE (OUTPATIENT)
Dept: PULMONOLOGY | Age: 25
End: 2020-11-12

## 2020-11-12 NOTE — TELEPHONE ENCOUNTER
Patient called, appt given for Dr. Ana Willson in 1/2021. She has been seen at ER x 2 June and Oct for chest pain. Has been following her PCP office. Oct she was put on Protonix and advised to call ROCKY Pack back if sxs persist. She has call to that office.

## 2020-11-12 NOTE — TELEPHONE ENCOUNTER
Pt stated that she has been having chest pain for the past few weeks. She has an appointment to see Dr. Kendall Helm in January.  Please call [phone]

## 2020-11-23 ENCOUNTER — VIRTUAL VISIT (OUTPATIENT)
Dept: FAMILY MEDICINE CLINIC | Age: 25
End: 2020-11-23
Payer: COMMERCIAL

## 2020-11-23 DIAGNOSIS — R11.2 NAUSEA AND VOMITING, INTRACTABILITY OF VOMITING NOT SPECIFIED, UNSPECIFIED VOMITING TYPE: Primary | ICD-10-CM

## 2020-11-23 PROCEDURE — 99213 OFFICE O/P EST LOW 20 MIN: CPT | Performed by: NURSE PRACTITIONER

## 2020-11-23 RX ORDER — ONDANSETRON 8 MG/1
8 TABLET, ORALLY DISINTEGRATING ORAL
Qty: 20 TAB | Refills: 2 | Status: SHIPPED | OUTPATIENT
Start: 2020-11-23 | End: 2020-12-02

## 2020-11-23 NOTE — PROGRESS NOTES
Maria Cox is a 22 y.o. female who was seen by synchronous (real-time) audio-video technology on 11/23/2020 for Nausea and Vomiting    Pt is follow-ing up on N/V. Pt states she is still nauseated and vomiting after eating a bland diet. Pt is vomiting broth, and what ever else she eats. Zofran is also not working well. PT is still getting dizzy with vomiting only. Pt states she feel like she has lost weight but she has not weighed herself so she is not sure. Assessment & Plan:   Diagnoses and all orders for this visit:    1. Nausea and vomiting, intractability of vomiting not specified, unspecified vomiting type  -     REFERRAL TO GASTROENTEROLOGY  -     ondansetron (ZOFRAN ODT) 8 mg disintegrating tablet; Take 1 Tab by mouth every eight (8) hours as needed for Nausea or Vomiting. Follow-up and Dispositions    · Return in about 3 months (around 2/23/2021) for Health Maintenance (Office Visit). 712  Subjective:       Prior to Admission medications    Medication Sig Start Date End Date Taking? Authorizing Provider   ondansetron (ZOFRAN ODT) 8 mg disintegrating tablet Take 1 Tab by mouth every eight (8) hours as needed for Nausea or Vomiting. 11/23/20  Yes Chris Chan NP   pantoprazole (PROTONIX) 40 mg tablet Take 1 Tab by mouth daily. 10/21/20  Yes Irene Pack NP   albuterol (ACCUNEB) 1.25 mg/3 mL nebu 1.25 mg by Nebulization route every six (6) hours as needed for Wheezing. Yes Cain, MD Octavia   albuterol-ipratropium (DUO-NEB) 2.5 mg-0.5 mg/3 ml nebu 3 mL by Nebulization route every four (4) hours as needed for Wheezing. 10/19/20  Yes JULIO Sunshine   montelukast (SINGULAIR) 10 mg tablet Take 1 Tab by mouth daily. 1/3/19  Yes July Hearn MD   EPINEPHrine (EPIPEN) 0.3 mg/0.3 mL injection 0.3 mg by IntraMUSCular route once as needed.    Yes Provider, Historical   albuterol (PROVENTIL HFA, VENTOLIN HFA, PROAIR HFA) 90 mcg/actuation inhaler Take 2 Puffs by inhalation every four (4) hours as needed for Wheezing. 12/1/16  Yes Aviva Burgos NP   omalizumab Elverna Gopi) 150 mg solr by SubCUTAneous route once. Yes Cain, MD Octavia   ondansetron (ZOFRAN ODT) 8 mg disintegrating tablet Take 1 Tab by mouth every eight (8) hours as needed for Nausea or Vomiting. 11/17/20 11/23/20  Binh Muro NP     Patient Active Problem List   Diagnosis Code    Asthma J45.909    Narcolepsy G47.419    Bronchiolitis J21.9    Obesity, morbid ([facility] [address] [address] E66.01    Chronic allergic rhinitis due to pollen J30.1    Gastroesophageal reflux disease K21.9    Moderate persistent asthma without complication A71.93     Current Outpatient Medications   Medication Sig Dispense Refill    ondansetron (ZOFRAN ODT) 8 mg disintegrating tablet Take 1 Tab by mouth every eight (8) hours as needed for Nausea or Vomiting. 20 Tab 2    pantoprazole (PROTONIX) 40 mg tablet Take 1 Tab by mouth daily. 30 Tab 5    albuterol (ACCUNEB) 1.25 mg/3 mL nebu 1.25 mg by Nebulization route every six (6) hours as needed for Wheezing.  albuterol-ipratropium (DUO-NEB) 2.5 mg-0.5 mg/3 ml nebu 3 mL by Nebulization route every four (4) hours as needed for Wheezing. 30 Nebule 0    montelukast (SINGULAIR) 10 mg tablet Take 1 Tab by mouth daily. 30 Tab 6    EPINEPHrine (EPIPEN) 0.3 mg/0.3 mL injection 0.3 mg by IntraMUSCular route once as needed.  albuterol (PROVENTIL HFA, VENTOLIN HFA, PROAIR HFA) 90 mcg/actuation inhaler Take 2 Puffs by inhalation every four (4) hours as needed for Wheezing. 1 Inhaler 0    omalizumab (XOLAIR) 150 mg solr by SubCUTAneous route once.        Allergies   Allergen Reactions    Bee Venom Protein (Honey Bee) Sneezing    Pollen Extracts Shortness of Breath    Shellfish Derived Swelling    Shellfish Derived Shortness of Breath and Swelling    Soy Shortness of Breath and Swelling    Tomato Swelling       ROS    Objective:     Patient-Reported Vitals 11/16/2020   Patient-Reported Weight 255   Patient-Reported Height 50   Patient-Reported LMP 10/20/2020      General: alert, cooperative, no distress   Mental  status: normal mood, behavior, speech, dress, motor activity, and thought processes, able to follow commands   HENT: NCAT   Neck: no visualized mass   Resp: no respiratory distress   Neuro: no gross deficits   Skin: no discoloration or lesions of concern on visible areas   Psychiatric: normal affect, consistent with stated mood, no evidence of hallucinations     Additional exam findings: N/A      We discussed the expected course, resolution and complications of the diagnosis(es) in detail. Medication risks, benefits, costs, interactions, and alternatives were discussed as indicated. I advised her to contact the office if her condition worsens, changes or fails to improve as anticipated. She expressed understanding with the diagnosis(es) and plan. Abelardo Ayala, who was evaluated through a patient-initiated, synchronous (real-time) audio-video encounter, and/or her healthcare decision maker, is aware that it is a billable service, with coverage as determined by her insurance carrier. She provided verbal consent to proceed: Yes, and patient identification was verified. It was conducted pursuant to the emergency declaration under the [facility], [address] [place] [address] authority and the [employer] General Act. A caregiver was present when appropriate. Ability to conduct physical exam was limited. I was in the office. The patient was at home.       Deuce Wagner NP

## 2020-12-02 ENCOUNTER — ANESTHESIA EVENT (OUTPATIENT)
Dept: ENDOSCOPY | Age: 25
End: 2020-12-02
Payer: COMMERCIAL

## 2020-12-02 RX ORDER — SCOLOPAMINE TRANSDERMAL SYSTEM 1 MG/1
1 PATCH, EXTENDED RELEASE TRANSDERMAL
COMMUNITY
End: 2020-12-03

## 2020-12-03 ENCOUNTER — HOSPITAL ENCOUNTER (OUTPATIENT)
Age: 25
Setting detail: OUTPATIENT SURGERY
Discharge: HOME | End: 2020-12-03
Attending: INTERNAL MEDICINE | Admitting: INTERNAL MEDICINE
Payer: COMMERCIAL

## 2020-12-03 ENCOUNTER — ANESTHESIA (OUTPATIENT)
Dept: ENDOSCOPY | Age: 25
End: 2020-12-03
Payer: COMMERCIAL

## 2020-12-03 VITALS
DIASTOLIC BLOOD PRESSURE: 79 MMHG | TEMPERATURE: 97.4 F | HEIGHT: 60 IN | HEART RATE: 57 BPM | SYSTOLIC BLOOD PRESSURE: 119 MMHG | RESPIRATION RATE: 16 BRPM | OXYGEN SATURATION: 99 % | WEIGHT: 245 LBS | BODY MASS INDEX: 48.1 KG/M2

## 2020-12-03 LAB
COVID-19 RAPID TEST, COVR: NOT DETECTED
HCG UR QL: NEGATIVE
SOURCE, COVRS: NORMAL
SPECIMEN TYPE, XMCV1T: NORMAL

## 2020-12-03 PROCEDURE — 77030008565 HC TBNG SUC IRR ERBE -B: Performed by: INTERNAL MEDICINE

## 2020-12-03 PROCEDURE — 74011000250 HC RX REV CODE- 250: Performed by: NURSE ANESTHETIST, CERTIFIED REGISTERED

## 2020-12-03 PROCEDURE — 74011250636 HC RX REV CODE- 250/636: Performed by: NURSE ANESTHETIST, CERTIFIED REGISTERED

## 2020-12-03 PROCEDURE — 00731 ANES UPR GI NDSC PX NOS: CPT | Performed by: NURSE ANESTHETIST, CERTIFIED REGISTERED

## 2020-12-03 PROCEDURE — 87635 SARS-COV-2 COVID-19 AMP PRB: CPT

## 2020-12-03 PROCEDURE — 81025 URINE PREGNANCY TEST: CPT

## 2020-12-03 PROCEDURE — 76060000031 HC ANESTHESIA FIRST 0.5 HR: Performed by: INTERNAL MEDICINE

## 2020-12-03 PROCEDURE — 00731 ANES UPR GI NDSC PX NOS: CPT | Performed by: ANESTHESIOLOGY

## 2020-12-03 PROCEDURE — 76040000019: Performed by: INTERNAL MEDICINE

## 2020-12-03 PROCEDURE — 2709999900 HC NON-CHARGEABLE SUPPLY: Performed by: INTERNAL MEDICINE

## 2020-12-03 RX ORDER — SODIUM CHLORIDE 0.9 % (FLUSH) 0.9 %
5-40 SYRINGE (ML) INJECTION AS NEEDED
Status: DISCONTINUED | OUTPATIENT
Start: 2020-12-03 | End: 2020-12-03 | Stop reason: HOSPADM

## 2020-12-03 RX ORDER — SODIUM CHLORIDE 0.9 % (FLUSH) 0.9 %
5-40 SYRINGE (ML) INJECTION EVERY 8 HOURS
Status: DISCONTINUED | OUTPATIENT
Start: 2020-12-03 | End: 2020-12-03 | Stop reason: HOSPADM

## 2020-12-03 RX ORDER — LIDOCAINE HYDROCHLORIDE 20 MG/ML
INJECTION, SOLUTION EPIDURAL; INFILTRATION; INTRACAUDAL; PERINEURAL AS NEEDED
Status: DISCONTINUED | OUTPATIENT
Start: 2020-12-03 | End: 2020-12-03 | Stop reason: HOSPADM

## 2020-12-03 RX ORDER — PROPOFOL 10 MG/ML
INJECTION, EMULSION INTRAVENOUS AS NEEDED
Status: DISCONTINUED | OUTPATIENT
Start: 2020-12-03 | End: 2020-12-03 | Stop reason: HOSPADM

## 2020-12-03 RX ORDER — SODIUM CHLORIDE, SODIUM LACTATE, POTASSIUM CHLORIDE, CALCIUM CHLORIDE 600; 310; 30; 20 MG/100ML; MG/100ML; MG/100ML; MG/100ML
50 INJECTION, SOLUTION INTRAVENOUS CONTINUOUS
Status: DISCONTINUED | OUTPATIENT
Start: 2020-12-03 | End: 2020-12-03 | Stop reason: HOSPADM

## 2020-12-03 RX ORDER — SODIUM CHLORIDE, SODIUM LACTATE, POTASSIUM CHLORIDE, CALCIUM CHLORIDE 600; 310; 30; 20 MG/100ML; MG/100ML; MG/100ML; MG/100ML
75 INJECTION, SOLUTION INTRAVENOUS CONTINUOUS
Status: DISCONTINUED | OUTPATIENT
Start: 2020-12-03 | End: 2020-12-03 | Stop reason: HOSPADM

## 2020-12-03 RX ADMIN — FAMOTIDINE 20 MG: 10 INJECTION INTRAVENOUS at 14:54

## 2020-12-03 RX ADMIN — SODIUM CHLORIDE 10 ML: 9 INJECTION, SOLUTION INTRAMUSCULAR; INTRAVENOUS; SUBCUTANEOUS at 14:44

## 2020-12-03 RX ADMIN — PROPOFOL 70 MG: 10 INJECTION, EMULSION INTRAVENOUS at 15:23

## 2020-12-03 RX ADMIN — PROPOFOL 30 MG: 10 INJECTION, EMULSION INTRAVENOUS at 15:27

## 2020-12-03 RX ADMIN — PROPOFOL 80 MG: 10 INJECTION, EMULSION INTRAVENOUS at 15:22

## 2020-12-03 RX ADMIN — SODIUM CHLORIDE, SODIUM LACTATE, POTASSIUM CHLORIDE, AND CALCIUM CHLORIDE 75 ML/HR: 600; 310; 30; 20 INJECTION, SOLUTION INTRAVENOUS at 14:45

## 2020-12-03 RX ADMIN — LIDOCAINE HYDROCHLORIDE 100 MG: 20 INJECTION, SOLUTION EPIDURAL; INFILTRATION; INTRACAUDAL; PERINEURAL at 15:27

## 2020-12-03 NOTE — DISCHARGE INSTRUCTIONS
DISCHARGE SUMMARY from Nurse    PATIENT INSTRUCTIONS:    After general anesthesia or intravenous sedation, for 24 hours or while taking prescription Narcotics:  · Limit your activities  · Do not drive and operate hazardous machinery  · Do not make important personal or business decisions  · Do  not drink alcoholic beverages  · If you have not urinated within 8 hours after discharge, please contact your surgeon on call. Report the following to your surgeon:  · Excessive pain, swelling, redness or odor of or around the surgical area  · Temperature over 100.5  · Nausea and vomiting lasting longer than 4 hours or if unable to take medications  · Any signs of decreased circulation or nerve impairment to extremity: change in color, persistent  numbness, tingling, coldness or increase pain  · Any questions    What to do at Home:  Recommended activity: Activity as tolerated and no driving for today. *  Please give a list of your current medications to your Primary Care Provider. *  Please update this list whenever your medications are discontinued, doses are      changed, or new medications (including over-the-counter products) are added. *  Please carry medication information at all times in case of emergency situations. These are general instructions for a healthy lifestyle:    No smoking/ No tobacco products/ Avoid exposure to second hand smoke  Surgeon General's Warning:  Quitting smoking now greatly reduces serious risk to your health. Obesity, smoking, and sedentary lifestyle greatly increases your risk for illness    A healthy diet, regular physical exercise & weight monitoring are important for maintaining a healthy lifestyle    You may be retaining fluid if you have a history of heart failure or if you experience any of the following symptoms:  Weight gain of 3 pounds or more overnight or 5 pounds in a week, increased swelling in our hands or feet or shortness of breath while lying flat in bed. Please call your doctor as soon as you notice any of these symptoms; do not wait until your next office visit. The discharge information has been reviewed with the patient. The patient verbalized understanding. Discharge medications reviewed with the patient and appropriate educational materials and side effects teaching were provided. ___________________________________________________________________________________________________________________________________    Patient Education        Upper GI Endoscopy: What to Expect at [address] [place] [address] had an upper GI endoscopy. Your doctor used a thin, lighted tube that bends to look at the inside of your esophagus, your stomach, and the first part of the small intestine, called the duodenum. After you have an endoscopy, you will stay at the hospital or clinic for 1 to 2 hours. This will allow the medicine to wear off. You will be able to go home after your doctor or nurse checks to make sure that you're not having any problems. You may have to stay overnight if you had treatment during the test. You may have a sore throat for a day or two after the test.  This care sheet gives you a general idea about what to expect after the test.  How can you care for yourself at home? Activity   · Rest as much as you need to after you go home. · You should be able to go back to your usual activities the day after the test.  Diet   · Follow your doctor's directions for eating after the test.  · Drink plenty of fluids (unless your doctor has told you not to). Medications   · If you have a sore throat the day after the test, use an over-the-counter spray to numb your throat. Follow-up care is a key part of your treatment and safety. Be sure to make and go to all appointments, and call your doctor if you are having problems. It's also a good idea to know your test results and keep a list of the medicines you take. When should you call for help?    Call 911 anytime you think you may need emergency care. For example, call if:    · You passed out (lost consciousness).     · You have trouble breathing.     · You pass maroon or bloody stools. Call your doctor now or seek immediate medical care if:    · You have pain that does not get better after your take pain medicine.     · You have new or worse belly pain.     · You have blood in your stools.     · You are sick to your stomach and cannot keep fluids down.     · You have a fever.     · You cannot pass stools or gas. Watch closely for changes in your health, and be sure to contact your doctor if:    · Your throat still hurts after a day or two.     · You do not get better as expected. Where can you learn more? Go to http://www.ruiz.com/  Enter J454 in the search box to learn more about \"Upper GI Endoscopy: What to Expect at Home. \"  Current as of: April 15, 2020               Content Version: 12.6  © 2006-2020 [employer]. Care instructions adapted under license by [employer] (which disclaims liability or warranty for this information). If you have questions about a medical condition or this instruction, always ask your healthcare professional. [address] any warranty or liability for your use of this information.

## 2020-12-03 NOTE — H&P
Chief Complaint: Persistent NV    History of present illness: Despite medication has nausea and vomiting. Unable to keep food down. PMH:   Past Medical History:   Diagnosis Date    Asthma     Bronchiolitis     [facility] [address]     Epilepsy ([facility] [address]     denies 12/2/2020    Narcolepsy     denies-12/2/20    Nausea and vomiting     Pneumonia     recurrent     Allergies:    Allergies   Allergen Reactions    Bee Venom Protein (Honey Bee) Sneezing    Pollen Extracts Shortness of Breath    Shellfish Derived Swelling    Shellfish Derived Shortness of Breath and Swelling    Soy Shortness of Breath and Swelling    Tomato Swelling     Medications:   Current Facility-Administered Medications:     lactated Ringers infusion, 75 mL/hr, IntraVENous, CONTINUOUS, Christian, Sandra S, CRNA, Last Rate: 75 mL/hr at 12/03/20 1445, 75 mL/hr at 12/03/20 1445    sodium chloride (NS) flush 5-40 mL, 5-40 mL, IntraVENous, Q8H, Christian, Sandra S, CRNA    sodium chloride (NS) flush 5-40 mL, 5-40 mL, IntraVENous, PRN, Christian, Sandra S, CRNA    lactated Ringers infusion, 50 mL/hr, IntraVENous, CONTINUOUS, Lenahan, Paxton E, CRNA    sodium chloride (NS) flush 5-40 mL, 5-40 mL, IntraVENous, Q8H, Lenahan, Paxton E, CRNA, 10 mL at 12/03/20 1444    sodium chloride (NS) flush 5-40 mL, 5-40 mL, IntraVENous, PRN, [place], CRNA  FH:   Family History   Problem Relation Age of Onset    Hypertension Mother     Diabetes Father     Diabetes Maternal Grandfather     Diabetes Paternal Grandmother      Social:   Social History     Socioeconomic History    Marital status: SINGLE     Spouse name: Not on file    Number of children: Not on file    Years of education: Not on file    Highest education level: Not on file   Occupational History    Occupation: [employer]   Tobacco Use    Smoking status: Never Smoker    Smokeless tobacco: Never Used   Substance and Sexual Activity    Alcohol use: Yes     Comment: occasionally    Drug use: No    Sexual activity: Never   Other Topics Concern    Caffeine Concern Yes     Comment: 1 cup a month    Exercise Yes     Comment: walks 5 minutes 5 times a day    Seat Belt Yes   Social History Narrative    ** Merged History Encounter **          Surgical H:   Past Surgical History:   Procedure Laterality Date    HX LOBECTOMY Right     HX LOBECTOMY  2013    right upper lung      (underdeloped lung-recurrent pneumonia-non-functioning)       ROS: positive for nausea and vomiting    Physical Exam:   Visit Vitals  BP (!) 145/78   Pulse 87   Temp 98.4 °F (36.9 °C)   Resp 20   Ht 5' (1.524 m)   Wt 111.1 kg (245 lb)   LMP 11/22/2020   SpO2 98%   Breastfeeding No   BMI 47.85 kg/m²     General appearance: alert, no distress  Eyes: pupils equal and reactive, extraocular eye movements intact  Nodes: No gross adenopathy in neck. Skin: no spider angiomata, jaundice, palmar erythema   Respiratory: clear to auscultation bilaterally  Cardiovascular: regular heart rate, no murmurs, no JVD, normal rate and regular rhythm  Abdomen: soft, non-tender, liver not enlarged, spleen not palpable, no obvious ascites  Extremities: no muscle wasting, no gross arthritic changes  Neurologic: alert and oriented, cranial nerves grossly intact, no asterixis    Labs:   Recent Results (from the past 24 hour(s))   SARS-COV-2    Collection Time: 12/03/20  1:51 PM   Result Value Ref Range    Specimen source Nasopharyngeal      COVID-19 rapid test Not detected NOTD      Specimen type NP Swab     HCG URINE, QL. - POC    Collection Time: 12/03/20  1:53 PM   Result Value Ref Range    Pregnancy test,urine (POC) Negative NEG           Imp/ Plan: Will proceed with EGD as planned. Risk benefits alternative including but not limited to infection, bleeding, perforation of viscous, allergic reaction and resultant morbidity and mortality was discussed. Chance of missing a significant lesion due to various reasons were discussed.       Bakari Daley MD  [facility], [place]

## 2020-12-03 NOTE — ANESTHESIA POSTPROCEDURE EVALUATION
Procedure(s):  UPPER ENDOSCOPY.     MAC    Anesthesia Post Evaluation      Multimodal analgesia: multimodal analgesia used between 6 hours prior to anesthesia start to PACU discharge  Patient location during evaluation: bedside  Patient participation: complete - patient participated  Level of consciousness: awake  Pain score: 0  Pain management: adequate  Airway patency: patent  Anesthetic complications: no  Cardiovascular status: stable  Respiratory status: acceptable  Hydration status: acceptable  Post anesthesia nausea and vomiting:  controlled  Final Post Anesthesia Temperature Assessment:  Normothermia (36.0-37.5 degrees C)      INITIAL Post-op Vital signs:   Vitals Value Taken Time   /60 12/3/2020  3:55 PM   Temp 36.8 °C (98.3 °F) 12/3/2020  3:35 PM   Pulse 75 12/3/2020  3:55 PM   Resp 12 12/3/2020  3:55 PM   SpO2 95 % 12/3/2020  3:55 PM

## 2020-12-03 NOTE — ANESTHESIA PREPROCEDURE EVALUATION
Relevant Problems   No relevant active problems       Anesthetic History               Review of Systems / Medical History  Patient summary reviewed and pertinent labs reviewed    Pulmonary            Asthma : well controlled    Comments: H/O right upper lobectomy for Pneumonia   Neuro/Psych              Cardiovascular                  Exercise tolerance: >4 METS     GI/Hepatic/Renal  Within defined limits              Endo/Other        Morbid obesity     Other Findings            Physical Exam    Airway  Mallampati: II  TM Distance: 4 - 6 cm  Neck ROM: normal range of motion   Mouth opening: Normal     Cardiovascular               Dental  No notable dental hx       Pulmonary  Breath sounds clear to auscultation               Abdominal  GI exam deferred       Other Findings            Anesthetic Plan    ASA: 2  Anesthesia type: MAC          Induction: Intravenous  Anesthetic plan and risks discussed with: Patient

## 2021-02-11 ENCOUNTER — OFFICE VISIT (OUTPATIENT)
Dept: PULMONOLOGY | Age: 26
End: 2021-02-11
Payer: COMMERCIAL

## 2021-02-11 VITALS
HEART RATE: 84 BPM | DIASTOLIC BLOOD PRESSURE: 86 MMHG | SYSTOLIC BLOOD PRESSURE: 128 MMHG | HEIGHT: 60 IN | TEMPERATURE: 98.3 F | OXYGEN SATURATION: 99 % | BODY MASS INDEX: 49.2 KG/M2 | WEIGHT: 250.6 LBS | RESPIRATION RATE: 18 BRPM

## 2021-02-11 DIAGNOSIS — J21.9 BRONCHIOLITIS: ICD-10-CM

## 2021-02-11 DIAGNOSIS — R07.1 CHEST PAIN ON BREATHING: Primary | ICD-10-CM

## 2021-02-11 DIAGNOSIS — J45.40 MODERATE PERSISTENT ASTHMA WITHOUT COMPLICATION: ICD-10-CM

## 2021-02-11 DIAGNOSIS — E66.01 OBESITY, MORBID (HCC): ICD-10-CM

## 2021-02-11 DIAGNOSIS — J47.9 BRONCHIECTASIS WITHOUT COMPLICATION (HCC): ICD-10-CM

## 2021-02-11 DIAGNOSIS — K21.9 GASTROESOPHAGEAL REFLUX DISEASE WITHOUT ESOPHAGITIS: ICD-10-CM

## 2021-02-11 DIAGNOSIS — J30.1 CHRONIC ALLERGIC RHINITIS DUE TO POLLEN: ICD-10-CM

## 2021-02-11 PROCEDURE — 99215 OFFICE O/P EST HI 40 MIN: CPT | Performed by: INTERNAL MEDICINE

## 2021-02-11 NOTE — LETTER
2/12/2021 Patient: Сергей Tran YOB: 1995 Date of Visit: 2/11/2021 Estephania Bass MD 
[facility] [place] [address] [address] [employer] Dear Estephania Bass MD, Thank you for referring Ms. Darryle Harari to [address] [place] [address] for evaluation. My notes for this consultation are attached. If you have questions, please do not hesitate to call me. I look forward to following your patient along with you.  
 
 
Sincerely, 
 
Hieu Juarez MD

## 2021-02-11 NOTE — PROGRESS NOTES
[facility]  Pulmonary, Critical Care, and Sleep Medicine      Pulmonary Office Progress Notes    Name: Josefa William     : 1995     Date: 2021        Subjective:     Patient is a 22 y.o. female is here for follow up for: Problems-asthma, chest pain. 21  Patient complains of symptoms of pressure like pain in the midsternal area for the past 3 months. She states that the pain comes and goes but symptoms persistent. Gets worse when she lays on her back, when she is walking and sometimes even at rest when she is talking. Occasionally pain is reproducible when she presses on her chest but for most time occurs when she takes a deep breath. She was evaluated and was diagnosed with costochondritis. In addition she was having persistent symptoms of nausea and vomiting and underwent endoscopy in January with no acute findings noted however she has been placed on a PPI for treatment. She continues to have pain and therefore is seeking pulmonary evaluation today. She has a history of asthma been maintained on Dulera and Xolair injections. She denies any symptoms of persistent fever, chills, night sweats. She is not on birth control pills  Denies swelling of the lower extremities  She is a non-smoker  There is history of right thoracotomy with right upper lobe resection at age 25 for evaluation of recurring pneumonias. Eventually tissue diagnosis of pleural fibrosis and chronic bronchitis bronchiectasis.   Patient works at [employer] and has to walk as well as move around lifting-this aggravates her pain    Past Medical History:   Diagnosis Date    Asthma     Bronchiolitis     [facility] ([facility] [address]     Epilepsy ([facility] [address]     denies 2020    Narcolepsy     denies-20    Nausea and vomiting     Pneumonia     recurrent       Allergies   Allergen Reactions    Bee Venom Protein (Honey Bee) Sneezing    Pollen Extracts Shortness of Breath    Shellfish Derived Swelling  Shellfish Derived Shortness of Breath and Swelling    Soy Shortness of Breath and Swelling    Tomato Swelling       Current Outpatient Medications   Medication Sig Dispense Refill    albuterol (ACCUNEB) 1.25 mg/3 mL nebu 1.25 mg by Nebulization route every six (6) hours as needed for Wheezing.  albuterol-ipratropium (DUO-NEB) 2.5 mg-0.5 mg/3 ml nebu 3 mL by Nebulization route every four (4) hours as needed for Wheezing. 30 Nebule 0    montelukast (SINGULAIR) 10 mg tablet Take 1 Tab by mouth daily. (Patient taking differently: Take 10 mg by mouth nightly.) 30 Tab 6    albuterol (PROVENTIL HFA, VENTOLIN HFA, PROAIR HFA) 90 mcg/actuation inhaler Take 2 Puffs by inhalation every four (4) hours as needed for Wheezing. 1 Inhaler 0    omalizumab (XOLAIR) 150 mg solr by SubCUTAneous route every fourteen (14) days.  pantoprazole (PROTONIX) 40 mg tablet Take 1 Tab by mouth daily. 30 Tab 5    EPINEPHrine (EPIPEN) 0.3 mg/0.3 mL injection 0.3 mg by IntraMUSCular route once as needed.          Review of Systems:  HEENT: No epistaxis, no nasal drainage, no difficulty in swallowing, no redness in eyes  Respiratory: as above  Cardiovascular: no chest pain, no palpitations, no chronic leg edema, no syncope  Gastrointestinal: no abd pain, no vomiting, no diarrhea, no bleeding symptoms  Genitourinary: No urinary symptoms or hematuria  Integument/breast: No ulcers or rashes  Musculoskeletal:Neg  Neurological: No focal weakness, no seizures, no headaches  Behvioral/Psych: No anxiety, no depression  Constitutional: No fever, no chills, no weight loss, no night sweats     Objective:     Visit Vitals  /86 (BP 1 Location: Right upper arm, BP Patient Position: Sitting, BP Cuff Size: Large adult)   Pulse 84   Temp 98.3 °F (36.8 °C) (Oral)   Resp 18   Ht 5' (1.524 m)   Wt 113.7 kg (250 lb 9.6 oz)   SpO2 99% Comment: RA Rest   BMI 48.94 kg/m²        Physical Exam:   General: comfortable, no acute distress, obese  HEENT: pupils reactive, sclera anicteric, EOM intact  Neck: No adenopathy or thyroid swelling, no JVD, supple  CVS: S1S2 no murmurs   Chest wall-nontender  RS: Mod AE bilaterally, no tactile fremitus or egophony, no accessory muscle use  Abd: soft, non tender, no hepatosplenomegaly  Neuro: non focal, awake, alert  Extrm: no leg edema, clubbing or cyanosis  Skin: no rash    Data review:     Admission on 12/03/2020, Discharged on 12/03/2020   Component Date Value Ref Range Status    Specimen source 12/03/2020 Nasopharyngeal    Final    COVID-19 rapid test 12/03/2020 Not detected  NOTD   Final    Comment: Rapid Abbott ID Now       Rapid NAAT:  The specimen is NEGATIVE for SARS-CoV-2, the novel coronavirus associated with COVID-19. Negative results should be treated as presumptive and, if inconsistent with clinical signs and symptoms or necessary for patient management, should be tested with an alternative molecular assay. Negative results do not preclude SARS-CoV-2 infection and should not be used as the sole basis for patient management decisions. This test has been authorized by the FDA under an Emergency Use Authorization (EUA) for use by authorized laboratories. Fact sheet for Healthcare Providers: ConventionUpdate.co.nz  Fact sheet for Patients: ConventionUpdate.co.nz       Methodology: Isothermal Nucleic Acid Amplification      Specimen type 12/03/2020 NP Swab    Final    Pregnancy test,urine (POC) 12/03/2020 Negative  NEG   Final    Test results should be confirmed using serum quantitative hCG when detection of pregnancy is critical and before performing any critical medical procedure.    Admission on 10/19/2020, Discharged on 10/19/2020   Component Date Value Ref Range Status    Ventricular Rate 10/19/2020 74  BPM Final    Atrial Rate 10/19/2020 74  BPM Final    P-R Interval 10/19/2020 154  ms Final    QRS Duration 10/19/2020 84  ms Final    Q-T Interval 10/19/2020 376  ms Final    QTC Calculation (Bezet) 10/19/2020 417  ms Final    Calculated P Axis 10/19/2020 24  degrees Final    Calculated R Axis 10/19/2020 57  degrees Final    Calculated T Axis 10/19/2020 54  degrees Final    Diagnosis 10/19/2020    Final                    Value:Normal sinus rhythm  Normal ECG  When compared with ECG of 09-JUN-2020 16:46,  No significant change was found  Confirmed by Lizett Khalil MD, ----- ([id number]) on 10/19/2020 4:04:14 PM      WBC 10/19/2020 9.8  4.6 - 13.2 K/uL Final    RBC 10/19/2020 4.83  4.20 - 5.30 M/uL Final    HGB 10/19/2020 12.2  12.0 - 16.0 g/dL Final    HCT 10/19/2020 38.3  35.0 - 45.0 % Final    MCV 10/19/2020 79.3  74.0 - 97.0 FL Final    MCH 10/19/2020 25.3  24.0 - 34.0 PG Final    MCHC 10/19/2020 31.9  31.0 - 37.0 g/dL Final    RDW 10/19/2020 14.9* 11.6 - 14.5 % Final    PLATELET 30/01/8914 035  135 - 420 K/uL Final    MPV 10/19/2020 9.9  9.2 - 11.8 FL Final    NEUTROPHILS 10/19/2020 45  40 - 73 % Final    LYMPHOCYTES 10/19/2020 48  21 - 52 % Final    MONOCYTES 10/19/2020 5  3 - 10 % Final    EOSINOPHILS 10/19/2020 2  0 - 5 % Final    BASOPHILS 10/19/2020 0  0 - 2 % Final    ABS. NEUTROPHILS 10/19/2020 4.4  1.8 - 8.0 K/UL Final    ABS. LYMPHOCYTES 10/19/2020 4.7* 0.9 - 3.6 K/UL Final    ABS. MONOCYTES 10/19/2020 0.5  0.05 - 1.2 K/UL Final    ABS. EOSINOPHILS 10/19/2020 0.2  0.0 - 0.4 K/UL Final    ABS.  BASOPHILS 10/19/2020 0.0  0.0 - 0.1 K/UL Final    DF 10/19/2020 AUTOMATED    Final    Sodium 10/19/2020 139  136 - 145 mmol/L Final    Potassium 10/19/2020 4.0  3.5 - 5.5 mmol/L Final    SPECIMEN IS SLIGHTLY HEMOLYZED    Chloride 10/19/2020 107  100 - 111 mmol/L Final    CO2 10/19/2020 28  21 - 32 mmol/L Final    Anion gap 10/19/2020 4  3.0 - 18 mmol/L Final    Glucose 10/19/2020 99  74 - 99 mg/dL Final    BUN 10/19/2020 10  7.0 - 18 MG/DL Final    Creatinine 10/19/2020 0.82  0.6 - 1.3 MG/DL Final    BUN/Creatinine ratio 10/19/2020 12  12 - 20   Final    GFR est AA 10/19/2020 >60  >60 ml/min/1.73m2 Final    GFR est non-AA 10/19/2020 >60  >60 ml/min/1.73m2 Final    Comment: (NOTE)  Estimated GFR is calculated using the Modification of Diet in Renal   Disease (MDRD) Study equation, reported for both  Americans   (GFRAA) and non- Americans (GFRNA), and normalized to 1.73m2   body surface area. The physician must decide which value applies to   the patient. The MDRD study equation should only be used in   individuals age 25 or older. It has not been validated for the   following: pregnant women, patients with serious comorbid conditions,   or on certain medications, or persons with extremes of body size,   muscle mass, or nutritional status.  Calcium 10/19/2020 8.5  8.5 - 10.1 MG/DL Final    Troponin-I, QT 10/19/2020 <0.02  0.0 - 0.045 NG/ML Final    Comment: The presence of detectable troponin above the reference range indicates myocardial injury which may be due to ischemia, myocarditis, trauma, etc.  Clinical correlation is necessary to establish the significance of this finding. Sequential testing is recommended to determine if the typical rise and fall of cTnI is demonstrated. Note:  Cardiac troponin I has a relatively long half life and may be present well after the CK MB has returned to baseline. The reference range is based on the 99th percentile of the referent population.        PFT  Spirometry 8/6/18:   FVC 77% of predicted FEV1 70% of predicted FEF 2575% 57% of predicted FEV1% 81;   flow volume loop: Normal appearance;   bronchodilator: No improvement   Impression: Mild obstructive lung defect and reduced FVC which could possibly suggest a restrictive lung defect     Date FVC FEV1  FEV1/FVC VIK88-03 TLC RV RV/TLC VC DLCO   8/6/2018  77  70  81  57                                              Imaging:  I have personally reviewed the patients radiographs and have reviewed the reports:  XR Results (most recent):  Results from [place] encounter on 10/19/20   XR CHEST PORT    Narrative EXAM: Chest Radiograph    INDICATION:  wheezing, cough    TECHNIQUE: AP view of the chest    COMPARISON: 6/9/2020, 10/10/2018, 8/6/2018 and 12/1/2016    FINDINGS: No pneumothorax identified. The lungs are clear. No infiltrates  appreciated. No effusions identified. The cardiomediastinal silhouette is  unremarkable. The pulmonary vasculature is unremarkable. The osseous  structures are unremarkable. Impression Impression:  1. No acute cardiopulmonary process. CT Results (most recent):  No results found for this or any previous visit. Patient Active Problem List   Diagnosis Code    Asthma J45.909    Narcolepsy G47.419    Bronchiolitis J21.9    Obesity, morbid ([address] E66.01    Chronic allergic rhinitis due to pollen J30.1    Gastroesophageal reflux disease K21.9    Moderate persistent asthma without complication G26.28       IMPRESSION:   · Chest wall pain-question pleuropericardial etiology versus esophagitis. Costochondritis also likely differential although pain is not reproducible now with palpation. Covid negative on recent testing  · Asthma-moderate persistent with allergic rhinitis-maintained on Dulera, Xolair and Singulair with as needed albuterol  · History of bronchiolitis/bronchiectasis/focal fibrosis-right upper lobe  · S/p right upper lobe resection  · GERD      RECOMMENDATIONS:   · Discussed with patient differential diagnosis.   · Will check sed rate, CRP and OFELIA for screening  · Check chest CAT scan-looking for pleuropericardial process  · Continue optimizing treatment for asthma  · Continue PPI for GERD  · We will follow-up after completion of testing and make further recommendations if indicated  · Preventive vaccinations  · Follow-up after testing completed        Windsor Cooks, MD    This patient has a high complexity chronic care condition   This Visit needed Moderate complexity medically necessary decision making and management plans.       Marcy Handley MD

## 2021-02-11 NOTE — PROGRESS NOTES
Basilia Ramirez presents today for   Chief Complaint   Patient presents with    Follow Up Chronic Condition      No Chest Pain or SOB today        Is someone accompanying this pt? No    Is the patient using any DME equipment during OV? No    -DME Company NA    Depression Screening:  3 most recent PHQ Screens 9/17/2019   Little interest or pleasure in doing things Not at all   Feeling down, depressed, irritable, or hopeless Not at all   Total Score PHQ 2 0       Learning Assessment:  Learning Assessment 6/26/2018   PRIMARY LEARNER Patient   HIGHEST LEVEL OF EDUCATION - PRIMARY LEARNER  SOME COLLEGE   BARRIERS PRIMARY LEARNER [address] CAREGIVER Yes   CO-LEARNER NAME Penny Rodríguez   CO-LEARNER HIGHEST LEVEL OF EDUCATION GRADUATED HIGH SCHOOL OR GED   BARRIERS CO-LEARNER NONE   PRIMARY LANGUAGE ENGLISH   PRIMARY LANGUAGE [address] [facility]    NEED No   LEARNER PREFERENCE PRIMARY LISTENING     READING     DEMONSTRATION   LEARNER PREFERENCE CO-LEARNER DEMONSTRATION   LEARNING SPECIAL TOPICS no   ANSWERED BY patient   RELATIONSHIP SELF       Abuse Screening:  No flowsheet data found. Fall Risk  No flowsheet data found. Coordination of Care:  1. Have you been to the ER, urgent care clinic since your last visit? Hospitalized since your last visit? No    2. Have you seen or consulted any other health care providers outside of the [address] [place] [address] since your last visit? Include any pap smears or colon screening. No    Patient has not had Covid Vaccine. Declined Flu Vaccine.

## 2021-02-15 ENCOUNTER — HOSPITAL ENCOUNTER (OUTPATIENT)
Dept: LAB | Age: 26
Discharge: HOME | End: 2021-02-15

## 2021-02-15 LAB — SENTARA SPECIMEN COL,SENBCF: NORMAL

## 2021-02-15 PROCEDURE — 99001 SPECIMEN HANDLING PT-LAB: CPT

## 2021-02-18 ENCOUNTER — HOSPITAL ENCOUNTER (OUTPATIENT)
Dept: CT IMAGING | Age: 26
Discharge: HOME | End: 2021-02-18
Attending: INTERNAL MEDICINE
Payer: COMMERCIAL

## 2021-02-18 DIAGNOSIS — R07.1 CHEST PAIN ON BREATHING: ICD-10-CM

## 2021-02-18 DIAGNOSIS — J47.9 BRONCHIECTASIS WITHOUT COMPLICATION (HCC): ICD-10-CM

## 2021-02-18 PROCEDURE — 71250 CT THORAX DX C-: CPT

## 2021-03-03 ENCOUNTER — OFFICE VISIT (OUTPATIENT)
Dept: FAMILY MEDICINE CLINIC | Age: 26
End: 2021-03-03
Payer: COMMERCIAL

## 2021-03-03 VITALS
WEIGHT: 253 LBS | HEIGHT: 60 IN | SYSTOLIC BLOOD PRESSURE: 135 MMHG | TEMPERATURE: 97.4 F | HEART RATE: 85 BPM | BODY MASS INDEX: 49.67 KG/M2 | DIASTOLIC BLOOD PRESSURE: 87 MMHG | OXYGEN SATURATION: 94 %

## 2021-03-03 DIAGNOSIS — Z00.00 WELL WOMAN EXAM (NO GYNECOLOGICAL EXAM): Primary | ICD-10-CM

## 2021-03-03 DIAGNOSIS — Z13.6 SCREENING FOR CARDIOVASCULAR CONDITION: ICD-10-CM

## 2021-03-03 PROCEDURE — 99395 PREV VISIT EST AGE 18-39: CPT | Performed by: FAMILY MEDICINE

## 2021-03-03 RX ORDER — AMITRIPTYLINE HYDROCHLORIDE 10 MG/1
TABLET, FILM COATED ORAL
COMMUNITY
Start: 2020-12-03

## 2021-03-03 NOTE — PATIENT INSTRUCTIONS
Well Visit, Ages 25 to 48: Care Instructions Your Care Instructions Physical exams can help you stay healthy. Your doctor has checked your overall health and may have suggested ways to take good care of yourself. He or she also may have recommended tests. At home, you can help prevent illness with healthy eating, regular exercise, and other steps. Follow-up care is a key part of your treatment and safety. Be sure to make and go to all appointments, and call your doctor if you are having problems. It's also a good idea to know your test results and keep a list of the medicines you take. How can you care for yourself at home? · Reach and stay at a healthy weight. This will lower your risk for many problems, such as obesity, diabetes, heart disease, and high blood pressure. · Get at least 30 minutes of physical activity on most days of the week. Walking is a good choice. You also may want to do other activities, such as running, swimming, cycling, or playing tennis or team sports. Discuss any changes in your exercise program with your doctor. · Do not smoke or allow others to smoke around you. If you need help quitting, talk to your doctor about stop-smoking programs and medicines. These can increase your chances of quitting for good. · Talk to your doctor about whether you have any risk factors for sexually transmitted infections (STIs). Having one sex partner (who does not have STIs and does not have sex with anyone else) is a good way to avoid these infections. · Use birth control if you do not want to have children at this time. Talk with your doctor about the choices available and what might be best for you. · Protect your skin from too much sun. When you're outdoors from 10 a.m. to 4 p.m., stay in the shade or cover up with clothing and a hat with a wide brim. Wear sunglasses that block UV rays. Even when it's cloudy, put broad-spectrum sunscreen (SPF 30 or higher) on any exposed skin. · See a dentist one or two times a year for checkups and to have your teeth cleaned. · Wear a seat belt in the car. Follow your doctor's advice about when to have certain tests. These tests can spot problems early. For everyone · Cholesterol. Have the fat (cholesterol) in your blood tested after age 21. Your doctor will tell you how often to have this done based on your age, family history, or other things that can increase your risk for heart disease. · Blood pressure. Have your blood pressure checked during a routine doctor visit. Your doctor will tell you how often to check your blood pressure based on your age, your blood pressure results, and other factors. · Vision. Talk with your doctor about how often to have a glaucoma test. 
· Diabetes. Ask your doctor whether you should have tests for diabetes. · Colon cancer. Your risk for colorectal cancer gets higher as you get older. Some experts say that adults should start regular screening at age 48 and stop at age 76. Others say to start before age 48 or continue after age 76. Talk with your doctor about your risk and when to start and stop screening. For women · Breast exam and mammogram. Talk to your doctor about when you should have a clinical breast exam and a mammogram. Medical experts differ on whether and how often women under 50 should have these tests. Your doctor can help you decide what is right for you. · Cervical cancer screening test and pelvic exam. Begin with a Pap test at age 24. The test often is part of a pelvic exam. Starting at age 27, you may choose to have a Pap test, an HPV test, or both tests at the same time (called co-testing). Talk with your doctor about how often to have testing. · Tests for sexually transmitted infections (STIs). Ask whether you should have tests for STIs. You may be at risk if you have sex with more than one person, especially if your partners do not wear condoms. For men · Tests for sexually transmitted infections (STIs). Ask whether you should have tests for STIs. You may be at risk if you have sex with more than one person, especially if you do not wear a condom. 
· Testicular cancer exam. Ask your doctor whether you should check your testicles regularly. 
· Prostate exam. Talk to your doctor about whether you should have a blood test (called a PSA test) for prostate cancer. Experts differ on whether and when men should have this test. Some experts suggest it if you are older than 45 and are -American or have a father or brother who got prostate cancer when he was younger than 65. 
When should you call for help? 
Watch closely for changes in your health, and be sure to contact your doctor if you have any problems or symptoms that concern you. 
Where can you learn more? 
Go to [email or web address] 
Enter P072 in the search box to learn more about \"Well Visit, Ages 18 to 50: Care Instructions.\" 
Current as of: May 27, 2020               Content Version: 12.6 
© 8683-0081 [employer].  
Care instructions adapted under license by [employer] (which disclaims liability or warranty for this information). If you have questions about a medical condition or this instruction, always ask your healthcare professional. [employer] disclaims any warranty or liability for your use of this information.

## 2021-03-03 NOTE — PROGRESS NOTES
Chief Complaint   Patient presents with    Follow Up Appointment     3 months     1. Have you been to the ER, urgent care clinic since your last visit? Hospitalized since your last visit? Yes [facility] 10/19/20 and 6/9/20 for trouble breathing. 2. Have you seen or consulted any other health care providers outside of the [address] [place] [address] since your last visit? Include any pap smears or colon screening.  No

## 2021-03-03 NOTE — PROGRESS NOTES
HPI:  Georgia Mora is a 22 y.o. female who presents today with   Chief Complaint   Patient presents with    Follow Up Appointment     3 months      C/o numbness in the arm with coolness of the arm. She is being worked up for an autoimmune illness by her pulmonologist.  She has follow up soon with pulmonary. Asthma is stable; she gets allergy shots; she may get SOB with walking and talking. She also has allergies; Has GERD. moderatley controlled; Has had upper endo; upper endo was unremarkable per pt report. 3 most recent PHQ Screens 3/3/2021   Little interest or pleasure in doing things Not at all   Feeling down, depressed, irritable, or hopeless Several days   Total Score PHQ 2 1               PMH,  Meds, Allergies, Family History, Social history reviewed      Current Outpatient Medications   Medication Sig Dispense Refill    albuterol (ACCUNEB) 1.25 mg/3 mL nebu 1.25 mg by Nebulization route every six (6) hours as needed for Wheezing.  albuterol-ipratropium (DUO-NEB) 2.5 mg-0.5 mg/3 ml nebu 3 mL by Nebulization route every four (4) hours as needed for Wheezing. 30 Nebule 0    montelukast (SINGULAIR) 10 mg tablet Take 1 Tab by mouth daily. (Patient taking differently: Take 10 mg by mouth nightly.) 30 Tab 6    albuterol (PROVENTIL HFA, VENTOLIN HFA, PROAIR HFA) 90 mcg/actuation inhaler Take 2 Puffs by inhalation every four (4) hours as needed for Wheezing. 1 Inhaler 0    omalizumab (XOLAIR) 150 mg solr by SubCUTAneous route every fourteen (14) days.  pantoprazole (PROTONIX) 40 mg tablet Take 1 Tab by mouth daily. 30 Tab 5    EPINEPHrine (EPIPEN) 0.3 mg/0.3 mL injection 0.3 mg by IntraMUSCular route once as needed.           Allergies   Allergen Reactions    Bee Venom Protein (Honey Bee) Sneezing    Pollen Extracts Shortness of Breath    Shellfish Derived Swelling    Shellfish Derived Shortness of Breath and Swelling    Soy Shortness of Breath and Swelling    Tomato Swelling                  Review of Systems   Constitutional: Negative for chills and fever. Respiratory: Negative for shortness of breath and wheezing. Cardiovascular: Negative for chest pain and palpitations. Visit Vitals  /87   Pulse 85   Temp 97.4 °F (36.3 °C)   Ht 5' (1.524 m)   Wt 253 lb (114.8 kg)   SpO2 94%   BMI 49.41 kg/m²     Physical Exam   General appearance: alert, cooperative, no distress, appears stated age  Neck: supple, symmetrical, trachea midline, no adenopathy, thyroid: not enlarged, symmetric, no tenderness/mass/nodules, no carotid bruit and no JVD  Lungs: clear to auscultation bilaterally  Heart: regular rate and rhythm, S1, S2 normal, no murmur, click, rub or gallop  Abdomen: soft, non-tender. Bowel sounds normal. No masses,  no organomegaly  Extremities: extremities normal, atraumatic, no cyanosis or edema      Assessment/Plan:    Diagnoses and all orders for this visit:    1. Well woman exam (no gynecological exam)    2. Screening for cardiovascular condition  -     LIPID PANEL; Future  -     METABOLIC PANEL, COMPREHENSIVE; Future      As above,   above all stable unless otherwise noted   treatment plan as listed below  Orders Placed This Encounter    LIPID PANEL    METABOLIC PANEL, COMPREHENSIVE    amitriptyline (ELAVIL) 10 mg tablet     meds reconciled  Follow-up and Dispositions    · Return in about 1 year (around 3/3/2022) for well exam.       This has been fully explained to the patient, who indicates understanding. An After Visit Summary was printed and given to the patient.               Campbell Rangel MD

## 2021-03-08 ENCOUNTER — OFFICE VISIT (OUTPATIENT)
Dept: PULMONOLOGY | Age: 26
End: 2021-03-08
Payer: COMMERCIAL

## 2021-03-08 VITALS
BODY MASS INDEX: 50.03 KG/M2 | HEIGHT: 60 IN | SYSTOLIC BLOOD PRESSURE: 130 MMHG | TEMPERATURE: 98.2 F | DIASTOLIC BLOOD PRESSURE: 80 MMHG | RESPIRATION RATE: 16 BRPM | WEIGHT: 254.8 LBS | HEART RATE: 82 BPM | OXYGEN SATURATION: 97 %

## 2021-03-08 DIAGNOSIS — J45.40 MODERATE PERSISTENT ASTHMA WITHOUT COMPLICATION: Primary | ICD-10-CM

## 2021-03-08 DIAGNOSIS — J21.9 BRONCHIOLITIS: ICD-10-CM

## 2021-03-08 DIAGNOSIS — E66.01 OBESITY, MORBID (HCC): ICD-10-CM

## 2021-03-08 PROCEDURE — 99214 OFFICE O/P EST MOD 30 MIN: CPT | Performed by: INTERNAL MEDICINE

## 2021-03-08 NOTE — LETTER
3/8/2021    Patient: Georgia Mora   YOB: 1995   Date of Visit: 3/8/2021     Tutu De La Cruz, [address]    Dear Tutu De La Cruz MD,      Thank you for referring Ms. Mary Victoria to [address] [place] [address] for evaluation. My notes for this consultation are attached. If you have questions, please do not hesitate to call me. I look forward to following your patient along with you.       Sincerely,    Jeremiah Joe MD

## 2021-03-08 NOTE — PROGRESS NOTES
[facility]  Pulmonary, Critical Care, and Sleep Medicine      Pulmonary Office Progress Notes    Name: Rebeca Marie     : 1995     Date: 3/8/2021        Subjective:     Patient is a 22 y.o. female is here for follow up for: Problems-asthma, chest pain. 21   Continues to have some complaints of chest pain-same area with no difference. Denies any fever chills  Completed all the requesting testing-CT scan of the chest and lab work and is here to discuss  On  she states that she had noticed that her arm-right side was hurting for a few days and then suddenly got numb where she felt numbness all the way to her fingers and also noticed that her fingers were purple. [facility]-and she was told she may have a autoimmune problem. She has since followed up with her primary care physician Dr. Justina Cooper    HPI  Patient complains of symptoms of pressure like pain in the midsternal area for the past 3 months. She states that the pain comes and goes but symptoms persistent. Gets worse when she lays on her back, when she is walking and sometimes even at rest when she is talking. Occasionally pain is reproducible when she presses on her chest but for most time occurs when she takes a deep breath. She was evaluated and was diagnosed with costochondritis. In addition she was having persistent symptoms of nausea and vomiting and underwent endoscopy in January with no acute findings noted however she has been placed on a PPI for treatment. She continues to have pain and therefore is seeking pulmonary evaluation today. She has a history of asthma been maintained on Dulera and Xolair injections. She denies any symptoms of persistent fever, chills, night sweats.   She is not on birth control pills  Denies swelling of the lower extremities  She is a non-smoker  There is history of right thoracotomy with right upper lobe resection at age 18 for evaluation of recurring pneumonias. Eventually tissue diagnosis of pleural fibrosis and chronic bronchitis bronchiectasis. Patient works at [employer] and has to walk as well as move around lifting-this aggravates her pain    Past Medical History:   Diagnosis Date    Asthma     Bronchiolitis     [facility]     Epilepsy ([facility]     denies 12/2/2020    Narcolepsy     denies-12/2/20    Nausea and vomiting     Pneumonia     recurrent       Allergies   Allergen Reactions    Bee Venom Protein (Honey Bee) Sneezing    Pollen Extracts Shortness of Breath    Shellfish Derived Swelling    Shellfish Derived Shortness of Breath and Swelling    Soy Shortness of Breath and Swelling    Tomato Swelling       Current Outpatient Medications   Medication Sig Dispense Refill    amitriptyline (ELAVIL) 10 mg tablet       albuterol (ACCUNEB) 1.25 mg/3 mL nebu 1.25 mg by Nebulization route every six (6) hours as needed for Wheezing.  albuterol-ipratropium (DUO-NEB) 2.5 mg-0.5 mg/3 ml nebu 3 mL by Nebulization route every four (4) hours as needed for Wheezing. 30 Nebule 0    montelukast (SINGULAIR) 10 mg tablet Take 1 Tab by mouth daily. (Patient taking differently: Take 10 mg by mouth nightly.) 30 Tab 6    omalizumab (XOLAIR) 150 mg solr by SubCUTAneous route every fourteen (14) days.  albuterol (PROVENTIL HFA, VENTOLIN HFA, PROAIR HFA) 90 mcg/actuation inhaler Take 2 Puffs by inhalation every four (4) hours as needed for Wheezing.  1 Inhaler 0       Review of Systems:  HEENT: No epistaxis, no nasal drainage, no difficulty in swallowing, no redness in eyes  Respiratory: as above  Cardiovascular: no chest pain, no palpitations, no chronic leg edema, no syncope  Gastrointestinal: no abd pain, no vomiting, no diarrhea, no bleeding symptoms  Genitourinary: No urinary symptoms or hematuria  Integument/breast: No ulcers or rashes  Musculoskeletal:Neg  Neurological: No focal weakness, no seizures, no headaches  Behvioral/Psych: No anxiety, no depression  Constitutional: No fever, no chills, no weight loss, no night sweats     Objective:     Visit Vitals  BP (!) 140/94 (BP 1 Location: Left upper arm, BP Patient Position: Sitting, BP Cuff Size: Large adult)   Pulse 82   Temp 98.2 °F (36.8 °C) (Oral)   Resp 16   Ht 5' (1.524 m)   Wt 115.6 kg (254 lb 12.8 oz)   SpO2 97% Comment: RA Rest   BMI 49.76 kg/m²        Physical Exam:   General: comfortable, no acute distress, obese  HEENT: pupils reactive, sclera anicteric, EOM intact  Neck: No adenopathy or thyroid swelling, no JVD, supple  CVS: S1S2 no murmurs   Chest wall-nontender  RS: Mod AE bilaterally, no tactile fremitus or egophony, no accessory muscle use  Abd: soft, non tender, no hepatosplenomegaly  Neuro: non focal, awake, alert  Extrm: no leg edema, clubbing or cyanosis  Skin: no rash    Data review:     Hospital Outpatient Visit on 02/15/2021   Component Date Value Ref Range Status   • [facility] SPECIMEN COL 02/15/2021 Specimens collected/sent to [facility]    Final   Admission on 12/03/2020, Discharged on 12/03/2020   Component Date Value Ref Range Status   • Specimen source 12/03/2020 Nasopharyngeal    Final   • COVID-19 rapid test 12/03/2020 Not detected  NOTD   Final    Comment: Rapid Abbott ID Now       Rapid NAAT:  The specimen is NEGATIVE for SARS-CoV-2, the novel coronavirus associated with COVID-19.       Negative results should be treated as presumptive and, if inconsistent with clinical signs and symptoms or necessary for patient management, should be tested with an alternative molecular assay.  Negative results do not preclude SARS-CoV-2 infection and should not be used as the sole basis for patient management decisions.       This test has been authorized by the FDA under an Emergency Use Authorization (EUA) for use by authorized laboratories.   Fact sheet for Healthcare Providers: https://www.fda.gov/media/015018/download  Fact sheet for Patients:  [facility]ianaCareUpdate.co.nz       Methodology: Isothermal Nucleic Acid Amplification      Specimen type 12/03/2020 NP Swab    Final    Pregnancy test,urine (POC) 12/03/2020 Negative  NEG   Final    Test results should be confirmed using serum quantitative hCG when detection of pregnancy is critical and before performing any critical medical procedure. Admission on 10/19/2020, Discharged on 10/19/2020   Component Date Value Ref Range Status    Ventricular Rate 10/19/2020 74  BPM Final    Atrial Rate 10/19/2020 74  BPM Final    P-R Interval 10/19/2020 154  ms Final    QRS Duration 10/19/2020 84  ms Final    Q-T Interval 10/19/2020 376  ms Final    QTC Calculation (Bezet) 10/19/2020 417  ms Final    Calculated P Axis 10/19/2020 24  degrees Final    Calculated R Axis 10/19/2020 57  degrees Final    Calculated T Axis 10/19/2020 54  degrees Final    Diagnosis 10/19/2020    Final                    Value:Normal sinus rhythm  Normal ECG  When compared with ECG of 09-JUN-2020 16:46,  No significant change was found  Confirmed by Chris Boss MD, ----- ([id number]) on 10/19/2020 4:04:14 PM      WBC 10/19/2020 9.8  4.6 - 13.2 K/uL Final    RBC 10/19/2020 4.83  4.20 - 5.30 M/uL Final    HGB 10/19/2020 12.2  12.0 - 16.0 g/dL Final    HCT 10/19/2020 38.3  35.0 - 45.0 % Final    MCV 10/19/2020 79.3  74.0 - 97.0 FL Final    MCH 10/19/2020 25.3  24.0 - 34.0 PG Final    MCHC 10/19/2020 31.9  31.0 - 37.0 g/dL Final    RDW 10/19/2020 14.9* 11.6 - 14.5 % Final    PLATELET 01/21/1739 027  135 - 420 K/uL Final    MPV 10/19/2020 9.9  9.2 - 11.8 FL Final    NEUTROPHILS 10/19/2020 45  40 - 73 % Final    LYMPHOCYTES 10/19/2020 48  21 - 52 % Final    MONOCYTES 10/19/2020 5  3 - 10 % Final    EOSINOPHILS 10/19/2020 2  0 - 5 % Final    BASOPHILS 10/19/2020 0  0 - 2 % Final    ABS. NEUTROPHILS 10/19/2020 4.4  1.8 - 8.0 K/UL Final    ABS. LYMPHOCYTES 10/19/2020 4.7* 0.9 - 3.6 K/UL Final    ABS.  MONOCYTES 10/19/2020 0.5  0.05 - 1.2 K/UL Final    ABS. EOSINOPHILS 10/19/2020 0.2  0.0 - 0.4 K/UL Final    ABS. BASOPHILS 10/19/2020 0.0  0.0 - 0.1 K/UL Final    DF 10/19/2020 AUTOMATED    Final    Sodium 10/19/2020 139  136 - 145 mmol/L Final    Potassium 10/19/2020 4.0  3.5 - 5.5 mmol/L Final    SPECIMEN IS SLIGHTLY HEMOLYZED    Chloride 10/19/2020 107  100 - 111 mmol/L Final    CO2 10/19/2020 28  21 - 32 mmol/L Final    Anion gap 10/19/2020 4  3.0 - 18 mmol/L Final    Glucose 10/19/2020 99  74 - 99 mg/dL Final    BUN 10/19/2020 10  7.0 - 18 MG/DL Final    Creatinine 10/19/2020 0.82  0.6 - 1.3 MG/DL Final    BUN/Creatinine ratio 10/19/2020 12  12 - 20   Final    GFR est AA 10/19/2020 >60  >60 ml/min/1.73m2 Final    GFR est non-AA 10/19/2020 >60  >60 ml/min/1.73m2 Final    Comment: (NOTE)  Estimated GFR is calculated using the Modification of Diet in Renal   Disease (MDRD) Study equation, reported for both  Americans   (GFRAA) and non- Americans (GFRNA), and normalized to 1.73m2   body surface area. The physician must decide which value applies to   the patient. The MDRD study equation should only be used in   individuals age 25 or older. It has not been validated for the   following: pregnant women, patients with serious comorbid conditions,   or on certain medications, or persons with extremes of body size,   muscle mass, or nutritional status.  Calcium 10/19/2020 8.5  8.5 - 10.1 MG/DL Final    Troponin-I, QT 10/19/2020 <0.02  0.0 - 0.045 NG/ML Final    Comment: The presence of detectable troponin above the reference range indicates myocardial injury which may be due to ischemia, myocarditis, trauma, etc.  Clinical correlation is necessary to establish the significance of this finding. Sequential testing is recommended to determine if the typical rise and fall of cTnI is demonstrated.   Note:  Cardiac troponin I has a relatively long half life and may be present well after the CK MB has returned to baseline. The reference range is based on the 99th percentile of the referent population. PFT  Spirometry 8/6/18:   FVC 77% of predicted FEV1 70% of predicted FEF 2575% 57% of predicted FEV1% 81;   flow volume loop: Normal appearance;   bronchodilator: No improvement   Impression: Mild obstructive lung defect and reduced FVC which could possibly suggest a restrictive lung defect     Date FVC FEV1  FEV1/FVC [id number] TLC RV RV/TLC VC DLCO   8/6/2018  77  70  81  57                                              Imaging:  I have personally reviewed the patients radiographs and have reviewed the reports:  XR Results (most recent):  Results from Hospital Encounter encounter on 10/19/20   XR CHEST PORT    Narrative EXAM: Chest Radiograph    INDICATION:  wheezing, cough    TECHNIQUE: AP view of the chest    COMPARISON: 6/9/2020, 10/10/2018, 8/6/2018 and 12/1/2016    FINDINGS: No pneumothorax identified. The lungs are clear. No infiltrates  appreciated. No effusions identified. The cardiomediastinal silhouette is  unremarkable. The pulmonary vasculature is unremarkable. The osseous  structures are unremarkable. Impression Impression:  1. No acute cardiopulmonary process. CT Results (most recent):  Results from Hospital Encounter encounter on 02/18/21   CT CHEST HIGH RES WO CONT    Narrative CT of high resolution chest     HISTORY: Chest pain with breathing, bronchiectasis    COMPARISON: None. TECHNIQUE: 1 mm axial scan with 10 mm interval is obtained from the thoracic  inlet to the diaphragm in supine and prone inspiration and supine expiration  phases without IV contrast administration per high resolution protocol.     All CT scans at this facility performed using dose optimization techniques as  appreciated to a performed exam, to include automated exposure control,  adjustment of the mA and or KU according to patient size (including appropriate  matching for site specific examination), or use of iterative reconstruction  technique. FINDINGS: There is normal lung volume. The lung parenchyma and interstitium  appear clear. There is no evidence of  intralobular or interlobular septal  thickening, cystic formation or reticular formation identified. There is no  evidence of bronchiectasis. There is no evidence of groundglass opacities or  active parenchymal disease seen. No significant air trapping identified on  expiration phase. No significant pleural disease identified. Limited evaluation to the mediastinum on this high resolution CT demonstrates no  significant mediastinal or hilar adenopathy or mass. Low-attenuation triangular  density in anterior mediastinum is favorable for residual thymic tissue. The  heart and pericardium appear remarkable. The aorta and the great vessels at the  arch appear grossly unremarkable. The thyroid appears unremarkable. Imaging portion to the upper abdomen shows unremarkable finding. Impression Negative study. No evidence of interstitial lung disease or other significant  finding. Thank you for your referral.         Patient Active Problem List   Diagnosis Code    Asthma J45.909    Narcolepsy G47.419    Bronchiolitis J21.9    Obesity, morbid ([address] E66.01    Chronic allergic rhinitis due to pollen J30.1    Gastroesophageal reflux disease K21.9    Moderate persistent asthma without complication N62.74    Chronic bronchitis (HCC) J42    Epilepsy ([address] G40.909    Nausea and vomiting R11.2       IMPRESSION:   · Chest wall pain-most likely esophagitis. Costochondritis also likely differential although pain is not reproducible now with palpation. Covid negative on recent testing. CT scan of chest negative for any pleuropericardial process or parenchymal process.   Sed rate mildly elevated however all collagen vascular serologies are negative-comprehensive OFELIA screen  · Asthma-moderate persistent with allergic rhinitis-maintained on Dulera, Xolair and Singulair with as needed albuterol  · History of bronchiolitis/bronchiectasis/focal fibrosis-right upper lobe  · S/p right upper lobe resection  · GERD  · Numbness of right hand-new per patient with difficulty grasping objects. Started with arm hurting then going numb and also turning purple according to patient. [facility] and was told likely could be related to autoimmune process. 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 Despite negative tests she could still have autoimmune etiology of her symptoms  · Discussed results of testing-mildly elevated CRP and sed rate otherwise negative serologies  · Continue optimizing treatment for asthma  · Continue PPI for GERD  · Preventive vaccinations  · Instructed patient to contact primary care MD for further evaluation of right hand numbness  · Follow-up in 6 months        Josef Fairbanks MD    This patient has a high complexity chronic care condition   This Visit needed Moderate complexity medically necessary decision making and management plans.       Artur Mireles MD

## 2021-03-08 NOTE — PROGRESS NOTES
Tiffany Mcbride presents today for   Chief Complaint   Patient presents with    Results     Labs        Is someone accompanying this pt? No    Is the patient using any DME equipment during OV? No    -DME Company NA    Depression Screening:  3 most recent PHQ Screens 3/3/2021   Little interest or pleasure in doing things Not at all   Feeling down, depressed, irritable, or hopeless Several days   Total Score PHQ 2 1       Learning Assessment:  Learning Assessment 6/26/2018   PRIMARY LEARNER Patient   HIGHEST LEVEL OF EDUCATION - PRIMARY LEARNER  SOME COLLEGE   BARRIERS PRIMARY LEARNER NONE   CO-LEARNER CAREGIVER Yes   CO-LEARNER NAME Penny Rodríguez   CO-LEARNER HIGHEST LEVEL OF EDUCATION GRADUATED HIGH SCHOOL OR GED   BARRIERS CO-LEARNER NONE   PRIMARY LANGUAGE ENGLISH   PRIMARY LANGUAGE CO-LEARNER ENGLISH    NEED No   LEARNER PREFERENCE PRIMARY LISTENING     READING     DEMONSTRATION   LEARNER PREFERENCE CO-LEARNER DEMONSTRATION   LEARNING SPECIAL TOPICS no   ANSWERED BY patient   RELATIONSHIP SELF       Abuse Screening:  No flowsheet data found. Fall Risk  No flowsheet data found. Coordination of Care:  1. Have you been to the ER, urgent care clinic since your last visit? Hospitalized since your last visit? Yes; Name: [facility]  2/21 Right arm numb and tingling     2. Have you seen or consulted any other health care providers outside of the [address] [place] [address] since your last visit? Include any pap smears or colon screening. No    Patient has not had Covid Vaccine. Declined Flu Vaccine.

## 2021-03-09 ENCOUNTER — TELEPHONE (OUTPATIENT)
Dept: PULMONOLOGY | Age: 26
End: 2021-03-09

## 2021-03-09 NOTE — TELEPHONE ENCOUNTER
Es Nieves MD 11 minutes ago (3:07 PM)        Good morning      As we talked about my result from my blood work , what could i do to get my protein level down ? Is there any medicine i have to take or food i have to watch eating ?

## 2021-04-21 NOTE — TELEPHONE ENCOUNTER
Last Visit: 3/3/21 with MD Colette Zamudio  Next Appointment: Advised to follow-up in 1 year  Previous Refill Encounter(s): 1/3/19 #30 with 6 refills    Requested Prescriptions     Pending Prescriptions Disp Refills    montelukast (SINGULAIR) 10 mg tablet 90 Tab 3     Sig: Take 1 Tab by mouth daily.

## 2021-04-23 RX ORDER — MONTELUKAST SODIUM 10 MG/1
10 TABLET ORAL DAILY
Qty: 90 TAB | Refills: 3 | Status: SHIPPED | OUTPATIENT
Start: 2021-04-23 | End: 2022-07-11

## 2022-03-18 PROBLEM — J45.40 MODERATE PERSISTENT ASTHMA WITHOUT COMPLICATION: Status: ACTIVE | Noted: 2018-01-25

## 2022-03-18 PROBLEM — K21.9 GASTROESOPHAGEAL REFLUX DISEASE: Status: ACTIVE | Noted: 2018-01-25

## 2022-03-19 PROBLEM — J30.1 CHRONIC ALLERGIC RHINITIS DUE TO POLLEN: Status: ACTIVE | Noted: 2018-01-25

## 2022-03-19 PROBLEM — E66.01 OBESITY, MORBID (HCC): Status: ACTIVE | Noted: 2018-06-26

## 2023-02-16 ENCOUNTER — TELEPHONE (OUTPATIENT)
Age: 28
End: 2023-02-16

## 2023-02-16 NOTE — TELEPHONE ENCOUNTER
Left message for patient to call office to schedule appointment with pcp been about 2 years since last seen.

## 2023-12-06 ENCOUNTER — HOSPITAL ENCOUNTER (EMERGENCY)
Facility: HOSPITAL | Age: 28
Discharge: HOME | End: 2023-12-06
Attending: EMERGENCY MEDICINE
Payer: COMMERCIAL

## 2023-12-06 ENCOUNTER — APPOINTMENT (OUTPATIENT)
Facility: HOSPITAL | Age: 28
End: 2023-12-06
Payer: COMMERCIAL

## 2023-12-06 VITALS
SYSTOLIC BLOOD PRESSURE: 148 MMHG | TEMPERATURE: 98.6 F | DIASTOLIC BLOOD PRESSURE: 95 MMHG | HEIGHT: 60 IN | HEART RATE: 70 BPM | BODY MASS INDEX: 51.83 KG/M2 | WEIGHT: 264 LBS | OXYGEN SATURATION: 100 % | RESPIRATION RATE: 17 BRPM

## 2023-12-06 DIAGNOSIS — S29.012A UPPER BACK STRAIN, INITIAL ENCOUNTER: Primary | ICD-10-CM

## 2023-12-06 PROCEDURE — 72072 X-RAY EXAM THORAC SPINE 3VWS: CPT

## 2023-12-06 PROCEDURE — 99283 EMERGENCY DEPT VISIT LOW MDM: CPT

## 2023-12-06 PROCEDURE — 6370000000 HC RX 637 (ALT 250 FOR IP): Performed by: EMERGENCY MEDICINE

## 2023-12-06 RX ORDER — IBUPROFEN 600 MG/1
600 TABLET ORAL 3 TIMES DAILY PRN
Qty: 30 TABLET | Refills: 0 | Status: SHIPPED | OUTPATIENT
Start: 2023-12-06

## 2023-12-06 RX ORDER — IBUPROFEN 600 MG/1
600 TABLET ORAL
Status: COMPLETED | OUTPATIENT
Start: 2023-12-06 | End: 2023-12-06

## 2023-12-06 RX ADMIN — IBUPROFEN 600 MG: 600 TABLET, FILM COATED ORAL at 23:30

## 2023-12-06 ASSESSMENT — ENCOUNTER SYMPTOMS
RESPIRATORY NEGATIVE: 1
BACK PAIN: 1

## 2023-12-06 ASSESSMENT — PAIN SCALES - GENERAL: PAINLEVEL_OUTOF10: 8

## 2023-12-06 ASSESSMENT — PAIN - FUNCTIONAL ASSESSMENT: PAIN_FUNCTIONAL_ASSESSMENT: 0-10

## 2023-12-07 NOTE — ED TRIAGE NOTES
Pt ambulatory c/o upper back pain that is exacerbated with movement and stretching. Per pt \" I was [facility] in October at [facility] and diagnosed with a minor back fracture - received muscle relaxer and anti-inflammatory\" Pt reports working in [employer] and re-injuring back today.

## 2023-12-07 NOTE — ED NOTES
Discharge teaching provided to pt regarding treatment received, medications prescribed, and follow-up care. Pt verbalized understanding directions and follow up care. Pt left ambulatory with discharge paperwork in hand.       Yana Riggs RN  12/06/23 [id number]

## 2023-12-07 NOTE — ED PROVIDER NOTES
[facility] EMERGENCY DEPT  eMERGENCY dEPARTMENT eNCOUnter      Pt Name: Leo Regalado  MRN: [id number]  [address] 1995 [facility] evaluation: 12/6/2023  Provider:Ozzy Pegueroelvira is a 29 y.o. female  c/o having upper back pain that started today. Patient states she has a hx of upper back pain. She states she did some heavy lifting on her job today. ROS    Review of Systems   Constitutional: Negative. HENT: Negative. Respiratory: Negative. Cardiovascular: Negative. Musculoskeletal:  Positive for back pain (upper). All other systems reviewed and are negative. Except as noted above the remainder of the review of systems was reviewed and negative.        PAST MEDICAL HISTORY     Past Medical History:   Diagnosis Date    Asthma     Bronchiolitis     Chronic bronchitis ([facility])     Epilepsy ([facility])     denies 12/2/2020    Narcolepsy     denies-12/2/20    Nausea and vomiting     Pneumonia     recurrent         SURGICAL HISTORY       Past Surgical History:   Procedure Laterality Date    LOBECTOMY Right     LOBECTOMY  2013    right upper lung      (underdeloped lung-recurrent pneumonia-non-functioning)         CURRENTMEDICATIONS       Previous Medications    ALBUTEROL SULFATE HFA (VENTOLIN HFA) 108 (90 BASE) MCG/ACT INHALER    Inhale 2 puffs into the lungs 4 times daily as needed for Wheezing    AMITRIPTYLINE (ELAVIL) 10 MG TABLET    ceived the following from [facility]: Outside name: amitriptyline (ELAVIL) 10 mg tablet    IPRATROPIUM-ALBUTEROL (DUONEB) 0.5-2.5 (3) MG/3ML SOLN NEBULIZER SOLUTION    Inhale 3 mLs into the lungs every 4 hours as needed    MONTELUKAST (SINGULAIR) 10 MG TABLET    take 1 tablet by mouth once daily       ALLERGIES     Pollen extract, Shellfish allergy, Soy, Bee venom, and Tomato    FAMILY HISTORY       Family History   Problem Relation Age of Onset    Diabetes Paternal Grandmother     Diabetes Maternal Grandfather

## 2024-01-17 ENCOUNTER — HOSPITAL ENCOUNTER (OUTPATIENT)
Facility: HOSPITAL | Age: 29
Discharge: HOME | End: 2024-01-20
Payer: COMMERCIAL

## 2024-01-17 VITALS — WEIGHT: 263 LBS | BODY MASS INDEX: 51.63 KG/M2 | HEIGHT: 60 IN

## 2024-01-17 DIAGNOSIS — N63.21 MASS OF UPPER OUTER QUADRANT OF LEFT BREAST: ICD-10-CM

## 2024-01-17 PROCEDURE — G0279 TOMOSYNTHESIS, MAMMO: HCPCS

## 2024-01-17 PROCEDURE — 76642 ULTRASOUND BREAST LIMITED: CPT

## 2024-01-26 ENCOUNTER — HOSPITAL ENCOUNTER (OUTPATIENT)
Facility: HOSPITAL | Age: 29
Discharge: HOME | End: 2024-01-26
Payer: COMMERCIAL

## 2024-01-26 DIAGNOSIS — N63.20 MASS OF LEFT BREAST, UNSPECIFIED QUADRANT: ICD-10-CM

## 2024-01-26 DIAGNOSIS — R92.8 ABNORMAL MAMMOGRAM: ICD-10-CM

## 2024-01-26 PROCEDURE — 76642 ULTRASOUND BREAST LIMITED: CPT

## 2024-03-31 ENCOUNTER — HOSPITAL ENCOUNTER (EMERGENCY)
Facility: HOSPITAL | Age: 29
Discharge: HOME | End: 2024-03-31
Attending: EMERGENCY MEDICINE
Payer: COMMERCIAL

## 2024-03-31 ENCOUNTER — APPOINTMENT (OUTPATIENT)
Facility: HOSPITAL | Age: 29
End: 2024-03-31
Payer: COMMERCIAL

## 2024-03-31 VITALS
BODY MASS INDEX: 51.83 KG/M2 | HEIGHT: 60 IN | DIASTOLIC BLOOD PRESSURE: 89 MMHG | TEMPERATURE: 99.5 F | SYSTOLIC BLOOD PRESSURE: 148 MMHG | RESPIRATION RATE: 22 BRPM | WEIGHT: 264 LBS | OXYGEN SATURATION: 96 % | HEART RATE: 88 BPM

## 2024-03-31 DIAGNOSIS — R05.1 ACUTE COUGH: ICD-10-CM

## 2024-03-31 DIAGNOSIS — J18.1 LOBAR PNEUMONIA (HCC): Primary | ICD-10-CM

## 2024-03-31 LAB
FLUAV AG NPH QL IA: NEGATIVE
FLUBV AG NOSE QL IA: NEGATIVE
SARS-COV-2 RDRP RESP QL NAA+PROBE: NOT DETECTED
SOURCE: NORMAL

## 2024-03-31 PROCEDURE — 6360000002 HC RX W HCPCS: Performed by: EMERGENCY MEDICINE

## 2024-03-31 PROCEDURE — 71045 X-RAY EXAM CHEST 1 VIEW: CPT

## 2024-03-31 PROCEDURE — 87804 INFLUENZA ASSAY W/OPTIC: CPT

## 2024-03-31 PROCEDURE — 6370000000 HC RX 637 (ALT 250 FOR IP): Performed by: EMERGENCY MEDICINE

## 2024-03-31 PROCEDURE — 87635 SARS-COV-2 COVID-19 AMP PRB: CPT

## 2024-03-31 PROCEDURE — 93005 ELECTROCARDIOGRAM TRACING: CPT | Performed by: EMERGENCY MEDICINE

## 2024-03-31 PROCEDURE — 99285 EMERGENCY DEPT VISIT HI MDM: CPT

## 2024-03-31 RX ORDER — DEXAMETHASONE 4 MG/1
10 TABLET ORAL ONCE
Status: COMPLETED | OUTPATIENT
Start: 2024-03-31 | End: 2024-03-31

## 2024-03-31 RX ORDER — ONDANSETRON 4 MG/1
4 TABLET, ORALLY DISINTEGRATING ORAL 3 TIMES DAILY PRN
Qty: 21 TABLET | Refills: 0 | Status: SHIPPED | OUTPATIENT
Start: 2024-03-31

## 2024-03-31 RX ORDER — AMOXICILLIN 500 MG/1
1000 CAPSULE ORAL 3 TIMES DAILY
Qty: 30 CAPSULE | Refills: 0 | Status: SHIPPED | OUTPATIENT
Start: 2024-03-31 | End: 2024-04-05

## 2024-03-31 RX ORDER — GUAIFENESIN AND DEXTROMETHORPHAN HYDROBROMIDE 1200; 60 MG/1; MG/1
1 TABLET, EXTENDED RELEASE ORAL 2 TIMES DAILY PRN
Qty: 28 TABLET | Refills: 0 | Status: SHIPPED | OUTPATIENT
Start: 2024-03-31

## 2024-03-31 RX ORDER — ONDANSETRON 4 MG/1
4 TABLET, ORALLY DISINTEGRATING ORAL
Status: DISCONTINUED | OUTPATIENT
Start: 2024-03-31 | End: 2024-03-31

## 2024-03-31 RX ORDER — DOXYCYCLINE HYCLATE 100 MG
100 TABLET ORAL 2 TIMES DAILY
Qty: 10 TABLET | Refills: 0 | Status: SHIPPED | OUTPATIENT
Start: 2024-03-31 | End: 2024-04-05

## 2024-03-31 RX ORDER — IPRATROPIUM BROMIDE AND ALBUTEROL SULFATE 2.5; .5 MG/3ML; MG/3ML
1 SOLUTION RESPIRATORY (INHALATION) ONCE
Status: COMPLETED | OUTPATIENT
Start: 2024-03-31 | End: 2024-03-31

## 2024-03-31 RX ADMIN — DEXAMETHASONE 10 MG: 4 TABLET ORAL at 15:07

## 2024-03-31 RX ADMIN — IPRATROPIUM BROMIDE AND ALBUTEROL SULFATE 1 DOSE: .5; 3 SOLUTION RESPIRATORY (INHALATION) at 14:04

## 2024-03-31 ASSESSMENT — LIFESTYLE VARIABLES
HOW MANY STANDARD DRINKS CONTAINING ALCOHOL DO YOU HAVE ON A TYPICAL DAY: 1 OR 2
HOW OFTEN DO YOU HAVE A DRINK CONTAINING ALCOHOL: MONTHLY OR LESS

## 2024-03-31 ASSESSMENT — PAIN SCALES - GENERAL: PAINLEVEL_OUTOF10: 8

## 2024-03-31 ASSESSMENT — PAIN - FUNCTIONAL ASSESSMENT: PAIN_FUNCTIONAL_ASSESSMENT: 0-10

## 2024-03-31 NOTE — ED TRIAGE NOTES
Pt has had a cough, congestion and runny nose for 2 days. Using inhaler and nebulizer without relief.

## 2024-04-01 LAB
EKG ATRIAL RATE: 95 BPM
EKG DIAGNOSIS: NORMAL
EKG P AXIS: 24 DEGREES
EKG P-R INTERVAL: 134 MS
EKG Q-T INTERVAL: 340 MS
EKG QRS DURATION: 76 MS
EKG QTC CALCULATION (BAZETT): 427 MS
EKG R AXIS: 44 DEGREES
EKG T AXIS: 54 DEGREES
EKG VENTRICULAR RATE: 95 BPM

## 2024-04-01 PROCEDURE — 93010 ELECTROCARDIOGRAM REPORT: CPT | Performed by: INTERNAL MEDICINE

## 2024-04-30 ENCOUNTER — APPOINTMENT (OUTPATIENT)
Facility: HOSPITAL | Age: 29
End: 2024-04-30
Payer: COMMERCIAL

## 2024-04-30 ENCOUNTER — HOSPITAL ENCOUNTER (EMERGENCY)
Facility: HOSPITAL | Age: 29
Discharge: HOME | End: 2024-04-30
Attending: EMERGENCY MEDICINE
Payer: COMMERCIAL

## 2024-04-30 VITALS
BODY MASS INDEX: 51.63 KG/M2 | HEART RATE: 105 BPM | DIASTOLIC BLOOD PRESSURE: 86 MMHG | SYSTOLIC BLOOD PRESSURE: 146 MMHG | OXYGEN SATURATION: 97 % | WEIGHT: 263 LBS | RESPIRATION RATE: 18 BRPM | HEIGHT: 60 IN | TEMPERATURE: 97.5 F

## 2024-04-30 DIAGNOSIS — J06.9 ACUTE UPPER RESPIRATORY INFECTION: Primary | ICD-10-CM

## 2024-04-30 DIAGNOSIS — J45.21 MILD INTERMITTENT ASTHMA WITH ACUTE EXACERBATION: ICD-10-CM

## 2024-04-30 LAB
FLUAV AG NPH QL IA: NEGATIVE
FLUBV AG NOSE QL IA: NEGATIVE

## 2024-04-30 PROCEDURE — 93005 ELECTROCARDIOGRAM TRACING: CPT | Performed by: EMERGENCY MEDICINE

## 2024-04-30 PROCEDURE — 71046 X-RAY EXAM CHEST 2 VIEWS: CPT

## 2024-04-30 PROCEDURE — 99285 EMERGENCY DEPT VISIT HI MDM: CPT

## 2024-04-30 PROCEDURE — 6370000000 HC RX 637 (ALT 250 FOR IP)

## 2024-04-30 PROCEDURE — 87804 INFLUENZA ASSAY W/OPTIC: CPT

## 2024-04-30 RX ORDER — BENZONATATE 100 MG/1
100 CAPSULE ORAL 2 TIMES DAILY PRN
Qty: 20 CAPSULE | Refills: 0 | Status: SHIPPED | OUTPATIENT
Start: 2024-04-30 | End: 2024-05-07

## 2024-04-30 RX ORDER — IPRATROPIUM BROMIDE AND ALBUTEROL SULFATE 2.5; .5 MG/3ML; MG/3ML
1 SOLUTION RESPIRATORY (INHALATION)
Status: COMPLETED | OUTPATIENT
Start: 2024-04-30 | End: 2024-04-30

## 2024-04-30 RX ORDER — LEVOFLOXACIN 500 MG/1
500 TABLET, FILM COATED ORAL DAILY
Qty: 7 TABLET | Refills: 0 | Status: SHIPPED | OUTPATIENT
Start: 2024-04-30 | End: 2024-05-07

## 2024-04-30 RX ORDER — ACETAMINOPHEN 325 MG/1
650 TABLET ORAL
Status: COMPLETED | OUTPATIENT
Start: 2024-04-30 | End: 2024-04-30

## 2024-04-30 RX ADMIN — IPRATROPIUM BROMIDE AND ALBUTEROL SULFATE 1 DOSE: .5; 3 SOLUTION RESPIRATORY (INHALATION) at 20:54

## 2024-04-30 RX ADMIN — ACETAMINOPHEN 325MG 650 MG: 325 TABLET ORAL at 20:54

## 2024-04-30 ASSESSMENT — PAIN DESCRIPTION - LOCATION
LOCATION: CHEST;GENERALIZED
LOCATION: CHEST

## 2024-04-30 ASSESSMENT — ENCOUNTER SYMPTOMS
RHINORRHEA: 1
NAUSEA: 0
COUGH: 1
EYE PAIN: 0
DIARRHEA: 0
WHEEZING: 1
CONSTIPATION: 0
EYE REDNESS: 0
ABDOMINAL PAIN: 0
SHORTNESS OF BREATH: 1
ABDOMINAL DISTENTION: 0

## 2024-04-30 ASSESSMENT — PAIN SCALES - GENERAL
PAINLEVEL_OUTOF10: 10
PAINLEVEL_OUTOF10: 10

## 2024-04-30 ASSESSMENT — PAIN - FUNCTIONAL ASSESSMENT: PAIN_FUNCTIONAL_ASSESSMENT: 0-10

## 2024-05-01 LAB
EKG ATRIAL RATE: 108 BPM
EKG DIAGNOSIS: NORMAL
EKG P AXIS: 29 DEGREES
EKG P-R INTERVAL: 128 MS
EKG Q-T INTERVAL: 314 MS
EKG QRS DURATION: 80 MS
EKG QTC CALCULATION (BAZETT): 420 MS
EKG R AXIS: 49 DEGREES
EKG T AXIS: 47 DEGREES
EKG VENTRICULAR RATE: 108 BPM

## 2024-05-01 PROCEDURE — 93010 ELECTROCARDIOGRAM REPORT: CPT | Performed by: INTERNAL MEDICINE

## 2024-05-01 NOTE — ED PROVIDER NOTES
EMERGENCY DEPARTMENT HISTORY AND PHYSICAL EXAM    9:44 PM      Date: 4/30/2024  Patient Name: Peyton Tyler    History of Presenting Illness     Chief Complaint   Patient presents with    Cough    Headache       History From: Patient  HPI  Patient is a 27yo F presenting with cough, body aches, congestion, poor appetite, fatigue, wheezing. Started 3 days ago and nothing has helped with symptoms. Found rx for antibiotics and steroid and has been taking since Sunday without relief. Has been using her albuterol inhaler and nebulizer. Last neb was 1 hour prior to arrival. Reports sick contact, with her niece last weekend who had a cold. Denies nausea, vomiting, chills. Has not taken anything over the counter. Only other home med is lisinopril which she did not take today.    Nursing Notes were all reviewed and agreed with or any disagreements were addressed in the HPI.    PCP: Melony Webster, DINA    No current facility-administered medications for this encounter.     Current Outpatient Medications   Medication Sig Dispense Refill    levoFLOXacin (LEVAQUIN) 500 MG tablet Take 1 tablet by mouth daily for 7 days 7 tablet 0    benzonatate (TESSALON) 100 MG capsule Take 1 capsule by mouth 2 times daily as needed for Cough 20 capsule 0    Dextromethorphan-guaiFENesin (MUCINEX DM MAXIMUM STRENGTH)  MG TB12 Take 1 tablet by mouth 2 times daily as needed (cough) 28 tablet 0    ondansetron (ZOFRAN-ODT) 4 MG disintegrating tablet Take 1 tablet by mouth 3 times daily as needed for Nausea or Vomiting 21 tablet 0    ibuprofen (ADVIL;MOTRIN) 600 MG tablet Take 1 tablet by mouth 3 times daily as needed for Pain 30 tablet 0    albuterol sulfate HFA (VENTOLIN HFA) 108 (90 Base) MCG/ACT inhaler Inhale 2 puffs into the lungs 4 times daily as needed for Wheezing 54 g 1    amitriptyline (ELAVIL) 10 MG tablet ceived the following from [facility]: Outside name: amitriptyline (ELAVIL) 10 mg tablet

## 2024-05-01 NOTE — ED TRIAGE NOTES
Pt arrived via Triage ambulatory c/o cough and headache for 5 days. Took a COVID test at home and it was negative.

## 2024-05-01 NOTE — ED NOTES
See resident's note for details. I saw and evaluated the patient and agree with the resident's finding and plans as written.      Simone Vickers MD  05/04/24 [id number]

## 2025-02-05 ENCOUNTER — APPOINTMENT (OUTPATIENT)
Facility: HOSPITAL | Age: 30
End: 2025-02-05
Attending: EMERGENCY MEDICINE
Payer: COMMERCIAL

## 2025-02-05 ENCOUNTER — HOSPITAL ENCOUNTER (EMERGENCY)
Facility: HOSPITAL | Age: 30
Discharge: HOME | End: 2025-02-05
Payer: COMMERCIAL

## 2025-02-05 VITALS
RESPIRATION RATE: 20 BRPM | BODY MASS INDEX: 53.4 KG/M2 | OXYGEN SATURATION: 100 % | WEIGHT: 272 LBS | HEIGHT: 60 IN | HEART RATE: 88 BPM | DIASTOLIC BLOOD PRESSURE: 78 MMHG | SYSTOLIC BLOOD PRESSURE: 140 MMHG | TEMPERATURE: 100.2 F

## 2025-02-05 DIAGNOSIS — J10.1 INFLUENZA A: Primary | ICD-10-CM

## 2025-02-05 LAB
FLUAV RNA SPEC QL NAA+PROBE: DETECTED
FLUBV RNA SPEC QL NAA+PROBE: NOT DETECTED
SARS-COV-2 RNA RESP QL NAA+PROBE: NOT DETECTED
SOURCE: ABNORMAL

## 2025-02-05 PROCEDURE — 71046 X-RAY EXAM CHEST 2 VIEWS: CPT

## 2025-02-05 PROCEDURE — 99284 EMERGENCY DEPT VISIT MOD MDM: CPT

## 2025-02-05 PROCEDURE — 6370000000 HC RX 637 (ALT 250 FOR IP): Performed by: EMERGENCY MEDICINE

## 2025-02-05 PROCEDURE — 6370000000 HC RX 637 (ALT 250 FOR IP)

## 2025-02-05 PROCEDURE — 87636 SARSCOV2 & INF A&B AMP PRB: CPT

## 2025-02-05 RX ORDER — IBUPROFEN 600 MG/1
600 TABLET, FILM COATED ORAL
Status: COMPLETED | OUTPATIENT
Start: 2025-02-05 | End: 2025-02-05

## 2025-02-05 RX ORDER — OSELTAMIVIR PHOSPHATE 75 MG/1
75 CAPSULE ORAL 2 TIMES DAILY
Qty: 10 CAPSULE | Refills: 0 | Status: SHIPPED | OUTPATIENT
Start: 2025-02-05 | End: 2025-02-10

## 2025-02-05 RX ORDER — ACETAMINOPHEN 500 MG
1000 TABLET ORAL
Status: COMPLETED | OUTPATIENT
Start: 2025-02-05 | End: 2025-02-05

## 2025-02-05 RX ADMIN — ACETAMINOPHEN 1000 MG: 500 TABLET ORAL at 15:43

## 2025-02-05 RX ADMIN — IBUPROFEN 600 MG: 600 TABLET, FILM COATED ORAL at 16:24

## 2025-02-05 ASSESSMENT — PAIN SCALES - GENERAL: PAINLEVEL_OUTOF10: 9

## 2025-02-05 ASSESSMENT — LIFESTYLE VARIABLES
HOW OFTEN DO YOU HAVE A DRINK CONTAINING ALCOHOL: MONTHLY OR LESS
HOW MANY STANDARD DRINKS CONTAINING ALCOHOL DO YOU HAVE ON A TYPICAL DAY: 1 OR 2

## 2025-02-05 ASSESSMENT — PAIN - FUNCTIONAL ASSESSMENT: PAIN_FUNCTIONAL_ASSESSMENT: 0-10

## 2025-02-05 NOTE — ED TRIAGE NOTES
Pt c/o headache and chest congestion that started yesterday. States she had a partial right lung removal at age 17

## 2025-02-05 NOTE — ED PROVIDER NOTES
EMERGENCY DEPARTMENT HISTORY AND PHYSICAL EXAM      Patient Name: Peyton Tyler  MRN: [id number]  YOB: 1995  Provider: Eliza Bobby PA-C  PCP: Melony Webster PA-C   Time/Date of evaluation: 4:47 PM EST on 2/5/25    History of Presenting Illness     Chief Complaint   Patient presents with    Headache    Chest Congestion       History Provided By: Patient     History (Narative):   Peyton Tyler is a 29 y.o. female with a PMHX of of chronic bronch colitis, narcolepsy, pneumonia reoccurring  who presents to the emergency department  by POV C/O of headache, generalized malaise, body ache, fever, chills and chest congestion.  Patient endorses that she has been having symptoms for the past day.  Patient denies any nausea, vomiting, abdominal pain.  Patient states that she is taken over-the-counter medication and it and took a breathing treatment prior to arrival that have helped.  Past History     Past Medical History:  Past Medical History:   Diagnosis Date    Asthma     Bronchiolitis     Chronic bronchitis (HCC)     Epilepsy (HCC)     denies 12/2/2020    Narcolepsy     denies-12/2/20    Nausea and vomiting     Pneumonia     recurrent       Past Surgical History:  Past Surgical History:   Procedure Laterality Date    LOBECTOMY Right     LOBECTOMY  2013    right upper lung      (underdeloped lung-recurrent pneumonia-non-functioning)       Family History:  Family History   Problem Relation Age of Onset    Diabetes Paternal Grandmother     Diabetes Maternal Grandfather     Hypertension Mother     Diabetes Father        Social History:  Social History     Tobacco Use    Smoking status: Never     Passive exposure: Never    Smokeless tobacco: Never   Substance Use Topics    Alcohol use: Yes     Comment: occasional    Drug use: No       Medications:  No current facility-administered medications for this encounter.     Current Outpatient Medications   Medication Sig Dispense Refill    Escitalopram

## 2025-03-27 ENCOUNTER — HOSPITAL ENCOUNTER (EMERGENCY)
Facility: HOSPITAL | Age: 30
Discharge: HOME | End: 2025-03-28
Attending: EMERGENCY MEDICINE
Payer: COMMERCIAL

## 2025-03-27 ENCOUNTER — APPOINTMENT (OUTPATIENT)
Facility: HOSPITAL | Age: 30
End: 2025-03-27
Payer: COMMERCIAL

## 2025-03-27 VITALS
HEART RATE: 72 BPM | WEIGHT: 269 LBS | DIASTOLIC BLOOD PRESSURE: 105 MMHG | BODY MASS INDEX: 52.81 KG/M2 | OXYGEN SATURATION: 97 % | SYSTOLIC BLOOD PRESSURE: 151 MMHG | HEIGHT: 60 IN | TEMPERATURE: 98.6 F | RESPIRATION RATE: 16 BRPM

## 2025-03-27 DIAGNOSIS — S93.401A MODERATE RIGHT ANKLE SPRAIN, INITIAL ENCOUNTER: Primary | ICD-10-CM

## 2025-03-27 PROCEDURE — 73610 X-RAY EXAM OF ANKLE: CPT

## 2025-03-27 PROCEDURE — 99283 EMERGENCY DEPT VISIT LOW MDM: CPT

## 2025-03-27 RX ORDER — OXYCODONE AND ACETAMINOPHEN 5; 325 MG/1; MG/1
1 TABLET ORAL EVERY 6 HOURS PRN
Qty: 12 TABLET | Refills: 0 | Status: SHIPPED | OUTPATIENT
Start: 2025-03-27 | End: 2025-03-30

## 2025-03-27 ASSESSMENT — LIFESTYLE VARIABLES
HOW OFTEN DO YOU HAVE A DRINK CONTAINING ALCOHOL: NEVER
HOW MANY STANDARD DRINKS CONTAINING ALCOHOL DO YOU HAVE ON A TYPICAL DAY: PATIENT DOES NOT DRINK

## 2025-03-27 ASSESSMENT — PAIN - FUNCTIONAL ASSESSMENT: PAIN_FUNCTIONAL_ASSESSMENT: 0-10

## 2025-03-27 ASSESSMENT — PAIN SCALES - GENERAL: PAINLEVEL_OUTOF10: 9

## 2025-03-28 PROCEDURE — 6370000000 HC RX 637 (ALT 250 FOR IP): Performed by: EMERGENCY MEDICINE

## 2025-03-28 RX ORDER — OXYCODONE AND ACETAMINOPHEN 5; 325 MG/1; MG/1
1 TABLET ORAL
Refills: 0 | Status: COMPLETED | OUTPATIENT
Start: 2025-03-28 | End: 2025-03-28

## 2025-03-28 RX ORDER — IBUPROFEN 600 MG/1
600 TABLET, FILM COATED ORAL
Status: COMPLETED | OUTPATIENT
Start: 2025-03-28 | End: 2025-03-28

## 2025-03-28 RX ADMIN — OXYCODONE HYDROCHLORIDE AND ACETAMINOPHEN 1 TABLET: 5; 325 TABLET ORAL at 00:14

## 2025-03-28 RX ADMIN — IBUPROFEN 600 MG: 600 TABLET, FILM COATED ORAL at 00:14

## 2025-03-28 ASSESSMENT — PAIN DESCRIPTION - DESCRIPTORS: DESCRIPTORS: ACHING

## 2025-03-28 ASSESSMENT — PAIN - FUNCTIONAL ASSESSMENT: PAIN_FUNCTIONAL_ASSESSMENT: ACTIVITIES ARE NOT PREVENTED

## 2025-03-28 ASSESSMENT — PAIN DESCRIPTION - ORIENTATION: ORIENTATION: RIGHT

## 2025-03-28 ASSESSMENT — PAIN DESCRIPTION - LOCATION: LOCATION: ANKLE

## 2025-03-28 ASSESSMENT — PAIN SCALES - GENERAL: PAINLEVEL_OUTOF10: 9

## 2025-03-28 NOTE — ED PROVIDER NOTES
[facility] EMERGENCY DEPARTMENT  eMERGENCY dEPARTMENT eNCOUnter      Pt Name: Peyton Tyler  MRN: [id number]  Birthdate 1995 of evaluation: 3/27/2025  Provider:Ozzy Funk MD    CHIEF COMPLAINT       HPI    Peyton Tyler is a 29 y.o. female  fell off her 3 stair tall porch just PTA. She was distracted and tripped. She C/O  R ankle pain and swelling. She denies head injury, LOC or blood thinners.     ROS    Review of Systems    Except as noted above the remainder of the review of systems was reviewed and negative.       PAST MEDICAL HISTORY     Past Medical History:   Diagnosis Date    Asthma     Bronchiolitis     Chronic bronchitis (HCC)     Epilepsy (HCC)     denies 12/2/2020    Narcolepsy     denies-12/2/20    Nausea and vomiting     Pneumonia     recurrent         SURGICAL HISTORY       Past Surgical History:   Procedure Laterality Date    LOBECTOMY Right     LOBECTOMY  2013    right upper lung      (underdeloped lung-recurrent pneumonia-non-functioning)         CURRENTMEDICATIONS       Previous Medications    ALBUTEROL SULFATE HFA (VENTOLIN HFA) 108 (90 BASE) MCG/ACT INHALER    Inhale 2 puffs into the lungs 4 times daily as needed for Wheezing    AMITRIPTYLINE (ELAVIL) 10 MG TABLET    ceived the following from [facility]: Outside name: amitriptyline (ELAVIL) 10 mg tablet    DEXTROMETHORPHAN-GUAIFENESIN (MUCINEX DM MAXIMUM STRENGTH)  MG TB12    Take 1 tablet by mouth 2 times daily as needed (cough)    ESCITALOPRAM OXALATE (LEXAPRO PO)    Take by mouth    IBUPROFEN (ADVIL;MOTRIN) 600 MG TABLET    Take 1 tablet by mouth 3 times daily as needed for Pain    IPRATROPIUM-ALBUTEROL (DUONEB) 0.5-2.5 (3) MG/3ML SOLN NEBULIZER SOLUTION    Inhale 3 mLs into the lungs every 4 hours as needed    MONTELUKAST (SINGULAIR) 10 MG TABLET    take 1 tablet by mouth once daily    ONDANSETRON (ZOFRAN-ODT) 4 MG DISINTEGRATING TABLET    Take 1 tablet by mouth 3 times daily as needed  for pain.  Patient to return to ER as needed.        XR ANKLE RIGHT (MIN 3 VIEWS)    (Results Pending)       11:48 PM  Upon re-evaluation the patient's symptoms have improved. Pt has non-toxic appearance and condition is stable for discharge. Patient was informed of tests & results, instructed to f/u with PCP and return to the ED upon worsening of symptoms. All questions and concerns were addressed.       Procedures    FINAL IMPRESSION      Acute ankle sprain      DISPOSITION/PLAN     DISPOSITION   D/C home.  F/U PCP in 3 to 4 days.  Return to ER prn.        DISCHARGE MEDICATIONS:      PATIENT REFERRED TO: ortho in 5 days.  Return to ER prn.          (Please note that portions of this note were completed with a voice recognitionprogram.  Efforts were made to edit the dictations but occasionally words are mis-transcribed.)    Ozzy Funk MD(electronically signed)  Attending Emergency Physician          Ozzy Funk MD  03/28/25 [id number]

## 2025-03-28 NOTE — ED TRIAGE NOTES
Pt fell off her 3 stair tall porch just PTA. She was distracted and tripped. She has R ankle pain and swelling. She denies head injury, LOC or blood thinners.

## 2025-04-17 ENCOUNTER — TRANSCRIBE ORDERS (OUTPATIENT)
Facility: HOSPITAL | Age: 30
End: 2025-04-17

## 2025-04-17 DIAGNOSIS — N60.02 CYST OF LEFT BREAST: Primary | ICD-10-CM

## 2025-04-28 ENCOUNTER — TRANSCRIBE ORDERS (OUTPATIENT)
Facility: HOSPITAL | Age: 30
End: 2025-04-28

## 2025-04-28 DIAGNOSIS — N60.02 CYST OF BREAST, LEFT: Primary | ICD-10-CM

## 2025-04-29 ENCOUNTER — OFFICE VISIT (OUTPATIENT)
Age: 30
End: 2025-04-29
Payer: COMMERCIAL

## 2025-04-29 DIAGNOSIS — S93.491A SPRAIN OF ANTERIOR TALOFIBULAR LIGAMENT OF RIGHT ANKLE, INITIAL ENCOUNTER: Primary | ICD-10-CM

## 2025-04-29 DIAGNOSIS — M79.671 RIGHT FOOT PAIN: ICD-10-CM

## 2025-04-29 DIAGNOSIS — S93.401A SPRAIN OF RIGHT ANKLE, UNSPECIFIED LIGAMENT, INITIAL ENCOUNTER: ICD-10-CM

## 2025-04-29 PROCEDURE — 73600 X-RAY EXAM OF ANKLE: CPT | Performed by: ORTHOPAEDIC SURGERY

## 2025-04-29 PROCEDURE — 73630 X-RAY EXAM OF FOOT: CPT | Performed by: ORTHOPAEDIC SURGERY

## 2025-04-29 PROCEDURE — 99204 OFFICE O/P NEW MOD 45 MIN: CPT | Performed by: ORTHOPAEDIC SURGERY

## 2025-04-29 NOTE — PROGRESS NOTES
AMBULATORY PROGRESS NOTE      Patient: Peyton Tyler             MRN: [id number]     SSN: [id number] There is no height or weight on file to calculate BMI.  YOB: 1995     AGE: 29 y.o.       EX: female    PCP: Melony Webster PA-C       IMPRESSION //  DIAGNOSIS AND TREATMENT PLAN      Peyton Tyler has a diagnosis of:      Peyton Tyler describes a twist injury to her right ankle, on March 27, 2025.  She went to ER, did get the x-rays, right ankle at that time, shows no soft tissue swelling, no fracture.  On evaluation today, she has tenderness of distal tibia near the syndesmotic region, and her x-rays, show what looks like may be some early periosteal reaction, the distal lateral tibia, near the syndesmotic region.  Because of this recommendations for MRI of her right ankle to fully define the extent of her injury, assessing the lateral portion of the tibia, syndesmotic interspace and overlap, and of course to assess the syndesmotic ligaments of this ankle.  I will see her after the MRI and make additional plans listed as below.    DIAGNOSES    1. Sprain of anterior talofibular ligament of right ankle, initial encounter    2. Right foot pain    3. Sprain of right ankle, unspecified ligament, initial encounter          PLAN:    1. Right Ankle MRI WO Contrast to assess potential occult fracture  2. Provided right AS/AC ankle brace today.   3. Dictate work note: continue to wear the CAM boot, allow to sit as needed     RTO after MRI    Orders Placed This Encounter    [32533] Foot Min 3V     right     Are you Pregnant?:   No    AMB POC XRAY, ANKLE; 2 VIEWS     Are you Pregnant?:   No    MRI ANKLE RIGHT WO CONTRAST     Standing Status:   Future     Expected Date:   4/29/2025     Expiration Date:   4/29/2026     Reason for exam::   Persistent distal tenderness, after ankle sprain assessed distal tibia posterior lateral distal tibia rule out occult stress fracture     What is the

## 2025-05-09 ENCOUNTER — HOSPITAL ENCOUNTER (OUTPATIENT)
Age: 30
Discharge: HOME | End: 2025-05-12
Attending: ORTHOPAEDIC SURGERY
Payer: COMMERCIAL

## 2025-05-09 DIAGNOSIS — S93.401A SPRAIN OF RIGHT ANKLE, UNSPECIFIED LIGAMENT, INITIAL ENCOUNTER: ICD-10-CM

## 2025-05-09 DIAGNOSIS — S93.491A SPRAIN OF ANTERIOR TALOFIBULAR LIGAMENT OF RIGHT ANKLE, INITIAL ENCOUNTER: ICD-10-CM

## 2025-05-09 PROCEDURE — 73721 MRI JNT OF LWR EXTRE W/O DYE: CPT

## 2025-05-28 ENCOUNTER — OFFICE VISIT (OUTPATIENT)
Age: 30
End: 2025-05-28
Payer: COMMERCIAL

## 2025-05-28 DIAGNOSIS — S93.401D SPRAIN OF RIGHT ANKLE, UNSPECIFIED LIGAMENT, SUBSEQUENT ENCOUNTER: Primary | ICD-10-CM

## 2025-05-28 DIAGNOSIS — S90.01XA CONTUSION OF RIGHT ANKLE, INITIAL ENCOUNTER: ICD-10-CM

## 2025-05-28 DIAGNOSIS — R93.7 BONE MARROW EDEMA: ICD-10-CM

## 2025-05-28 PROCEDURE — 99214 OFFICE O/P EST MOD 30 MIN: CPT | Performed by: ORTHOPAEDIC SURGERY

## 2025-05-28 RX ORDER — TRAMADOL HYDROCHLORIDE 50 MG/1
50 TABLET ORAL 2 TIMES DAILY PRN
Qty: 14 TABLET | Refills: 0 | Status: SHIPPED | OUTPATIENT
Start: 2025-05-28 | End: 2025-06-04

## 2025-05-28 NOTE — PROGRESS NOTES
AMBULATORY PROGRESS NOTE      Patient: Peyton Tyler             MRN: [id number]     SSN: [id number] There is no height or weight on file to calculate BMI.  YOB: 1995     AGE: 29 y.o.       EX: female    PCP: Melony Webster PA-C       IMPRESSION //  DIAGNOSIS AND TREATMENT PLAN      Peyton Tyler has a diagnosis of:        Peyton Tyler sustained a severe ankle sprain back in March 2025.  When I initially evaluated her as concerned about possible syndesmotic injury I ordered MRI of her ankle does show extensive bone marrow edema to the talus, effusions to the ankle subtalar and talonavicular regions, as well as severe sprain to the ATFL capsular complex and CFL Complexes of the Ankle.  The MRI also show peroneal brevis longus tendinitis, extensive edema throughout the talus, no definite displaced fracture.  There is evidence of a deltoid ligament partial tear.    She Works for [employer]: Standing and Walking, She Still Having Pain and Discomfort after Working 10 to 12-Hour Shifts.  They Regularly Light Duty, Available for Her at This Time, As These Positions Are Being Currently Taken by Patients That Are Currently Pregnant, This Is What She Tells Me.      DIAGNOSES    1. Sprain of right ankle, unspecified ligament, subsequent encounter    2. Contusion of right ankle, initial encounter    3. Bone marrow edema          PLAN:    1. DME: right hinged AFO brace from    2. Anticipate wean from CAM walker boot into custom AFO brace once acquired   3. Prescribed Tramadol: 1 p.o. twice daily as needed pain.  I was going to [facility], but this interacts with her Lexapro, and because can cause increased bleeding so I do not write for Lexapro do not write for Pepcid.    RTO 5 weeks     No orders of the defined types were placed in this encounter.       There are no Patient Instructions on file for this visit.      Please follow up with your PCP for any health maintenance as

## 2025-06-02 ENCOUNTER — HOSPITAL ENCOUNTER (OUTPATIENT)
Facility: HOSPITAL | Age: 30
Discharge: HOME | End: 2025-06-05
Payer: COMMERCIAL

## 2025-06-02 ENCOUNTER — HOSPITAL ENCOUNTER (OUTPATIENT)
Facility: HOSPITAL | Age: 30
Discharge: HOME | End: 2025-06-05

## 2025-06-02 DIAGNOSIS — N60.02 CYST OF LEFT BREAST: ICD-10-CM

## 2025-06-02 PROCEDURE — 76642 ULTRASOUND BREAST LIMITED: CPT

## 2025-06-27 ENCOUNTER — APPOINTMENT (OUTPATIENT)
Age: 30
End: 2025-06-27
Payer: COMMERCIAL

## 2025-06-27 ENCOUNTER — HOSPITAL ENCOUNTER (EMERGENCY)
Age: 30
Discharge: HOME | End: 2025-06-27
Payer: COMMERCIAL

## 2025-06-27 VITALS
HEART RATE: 88 BPM | OXYGEN SATURATION: 98 % | DIASTOLIC BLOOD PRESSURE: 95 MMHG | WEIGHT: 272 LBS | SYSTOLIC BLOOD PRESSURE: 133 MMHG | TEMPERATURE: 99.2 F | BODY MASS INDEX: 53.4 KG/M2 | HEIGHT: 60 IN | RESPIRATION RATE: 24 BRPM

## 2025-06-27 DIAGNOSIS — J06.9 ACUTE UPPER RESPIRATORY INFECTION: ICD-10-CM

## 2025-06-27 DIAGNOSIS — J45.21 MILD INTERMITTENT ASTHMA WITH EXACERBATION: Primary | ICD-10-CM

## 2025-06-27 LAB
ALBUMIN SERPL-MCNC: 3.7 G/DL (ref 3.4–5)
ALBUMIN/GLOB SERPL: 0.8 (ref 1–1.9)
ALP SERPL-CCNC: 110 U/L (ref 45–117)
ALT SERPL-CCNC: 14 U/L (ref 10–35)
ANION GAP SERPL CALC-SCNC: 12 MMOL/L (ref 7–16)
AST SERPL-CCNC: 15 U/L (ref 10–38)
BASOPHILS # BLD: 0.02 K/UL (ref 0–0.1)
BASOPHILS NFR BLD: 0.4 % (ref 0–2)
BILIRUB SERPL-MCNC: 0.2 MG/DL (ref 0.2–1)
BUN SERPL-MCNC: 4 MG/DL (ref 6–23)
BUN/CREAT SERPL: 6
CALCIUM SERPL-MCNC: 9.1 MG/DL (ref 8.5–10.1)
CHLORIDE SERPL-SCNC: 102 MMOL/L (ref 98–107)
CO2 SERPL-SCNC: 22 MMOL/L (ref 21–32)
CREAT SERPL-MCNC: 0.66 MG/DL (ref 0.6–1.3)
DIFFERENTIAL METHOD BLD: ABNORMAL
EKG ATRIAL RATE: 94 BPM
EKG DIAGNOSIS: NORMAL
EKG P AXIS: 33 DEGREES
EKG P-R INTERVAL: 132 MS
EKG Q-T INTERVAL: 336 MS
EKG QRS DURATION: 72 MS
EKG QTC CALCULATION (BAZETT): 420 MS
EKG R AXIS: 38 DEGREES
EKG T AXIS: 47 DEGREES
EKG VENTRICULAR RATE: 94 BPM
EOSINOPHIL # BLD: 0.29 K/UL (ref 0–0.4)
EOSINOPHIL NFR BLD: 5.8 % (ref 0–5)
ERYTHROCYTE [DISTWIDTH] IN BLOOD BY AUTOMATED COUNT: 14.2 % (ref 11.6–14.5)
FLUAV RNA SPEC QL NAA+PROBE: NOT DETECTED
FLUBV RNA SPEC QL NAA+PROBE: NOT DETECTED
GLOBULIN SER CALC-MCNC: 4.4 G/DL (ref 2.3–3.5)
GLUCOSE SERPL-MCNC: 95 MG/DL (ref 74–108)
HCT VFR BLD AUTO: 43.3 % (ref 35–45)
HGB BLD-MCNC: 13.8 G/DL (ref 12–16)
IMM GRANULOCYTES # BLD AUTO: 0.01 K/UL (ref 0–0.04)
IMM GRANULOCYTES NFR BLD AUTO: 0.2 % (ref 0–0.5)
LYMPHOCYTES # BLD: 2.09 K/UL (ref 0.9–3.6)
LYMPHOCYTES NFR BLD: 41.8 % (ref 21–52)
MCH RBC QN AUTO: 24.6 PG (ref 24–34)
MCHC RBC AUTO-ENTMCNC: 31.9 G/DL (ref 31–37)
MCV RBC AUTO: 77.3 FL (ref 78–100)
MONOCYTES # BLD: 0.45 K/UL (ref 0.05–1.2)
MONOCYTES NFR BLD: 9 % (ref 3–10)
NEUTS SEG # BLD: 2.14 K/UL (ref 1.8–8)
NEUTS SEG NFR BLD: 42.8 % (ref 40–73)
NRBC # BLD: 0 K/UL (ref 0–0.01)
NRBC BLD-RTO: 0 PER 100 WBC
PLATELET # BLD AUTO: 400 K/UL (ref 135–420)
PMV BLD AUTO: 9.3 FL (ref 9.2–11.8)
POTASSIUM SERPL-SCNC: 4.1 MMOL/L (ref 3.5–5.5)
PROT SERPL-MCNC: 8 G/DL (ref 6.4–8.2)
RBC # BLD AUTO: 5.6 M/UL (ref 4.2–5.3)
SARS-COV-2 RNA RESP QL NAA+PROBE: NOT DETECTED
SODIUM SERPL-SCNC: 137 MMOL/L (ref 136–145)
SOURCE: NORMAL
TROPONIN T SERPL HS-MCNC: <6 NG/L (ref 0–14)
WBC # BLD AUTO: 5 K/UL (ref 4.6–13.2)

## 2025-06-27 PROCEDURE — 71046 X-RAY EXAM CHEST 2 VIEWS: CPT

## 2025-06-27 PROCEDURE — 96365 THER/PROPH/DIAG IV INF INIT: CPT

## 2025-06-27 PROCEDURE — 6370000000 HC RX 637 (ALT 250 FOR IP): Performed by: PHYSICIAN ASSISTANT

## 2025-06-27 PROCEDURE — 84484 ASSAY OF TROPONIN QUANT: CPT

## 2025-06-27 PROCEDURE — 80053 COMPREHEN METABOLIC PANEL: CPT

## 2025-06-27 PROCEDURE — 99285 EMERGENCY DEPT VISIT HI MDM: CPT

## 2025-06-27 PROCEDURE — 6360000002 HC RX W HCPCS: Performed by: PHYSICIAN ASSISTANT

## 2025-06-27 PROCEDURE — 96375 TX/PRO/DX INJ NEW DRUG ADDON: CPT

## 2025-06-27 PROCEDURE — 85025 COMPLETE CBC W/AUTO DIFF WBC: CPT

## 2025-06-27 PROCEDURE — 87636 SARSCOV2 & INF A&B AMP PRB: CPT

## 2025-06-27 PROCEDURE — 2500000003 HC RX 250 WO HCPCS: Performed by: PHYSICIAN ASSISTANT

## 2025-06-27 PROCEDURE — 94640 AIRWAY INHALATION TREATMENT: CPT

## 2025-06-27 RX ORDER — IPRATROPIUM BROMIDE AND ALBUTEROL SULFATE 2.5; .5 MG/3ML; MG/3ML
1 SOLUTION RESPIRATORY (INHALATION)
Status: COMPLETED | OUTPATIENT
Start: 2025-06-27 | End: 2025-06-27

## 2025-06-27 RX ORDER — MAGNESIUM SULFATE IN WATER 40 MG/ML
2000 INJECTION, SOLUTION INTRAVENOUS
Status: COMPLETED | OUTPATIENT
Start: 2025-06-27 | End: 2025-06-27

## 2025-06-27 RX ORDER — BENZONATATE 100 MG/1
100 CAPSULE ORAL 3 TIMES DAILY PRN
Qty: 21 CAPSULE | Refills: 0 | Status: SHIPPED | OUTPATIENT
Start: 2025-06-27 | End: 2025-07-04

## 2025-06-27 RX ORDER — ALBUTEROL SULFATE 90 UG/1
2 INHALANT RESPIRATORY (INHALATION) EVERY 6 HOURS PRN
Qty: 18 G | Refills: 0 | Status: SHIPPED | OUTPATIENT
Start: 2025-06-27

## 2025-06-27 RX ORDER — PREDNISONE 50 MG/1
50 TABLET ORAL DAILY
Qty: 5 TABLET | Refills: 0 | Status: SHIPPED | OUTPATIENT
Start: 2025-06-27 | End: 2025-07-02

## 2025-06-27 RX ADMIN — IPRATROPIUM BROMIDE AND ALBUTEROL SULFATE 1 DOSE: .5; 3 SOLUTION RESPIRATORY (INHALATION) at 13:10

## 2025-06-27 RX ADMIN — MAGNESIUM SULFATE HEPTAHYDRATE 2000 MG: 40 INJECTION, SOLUTION INTRAVENOUS at 14:06

## 2025-06-27 RX ADMIN — IPRATROPIUM BROMIDE AND ALBUTEROL SULFATE 1 DOSE: .5; 3 SOLUTION RESPIRATORY (INHALATION) at 13:11

## 2025-06-27 RX ADMIN — METHYLPREDNISOLONE SODIUM SUCCINATE 125 MG: 125 INJECTION INTRAMUSCULAR; INTRAVENOUS at 13:22

## 2025-06-27 RX ADMIN — IPRATROPIUM BROMIDE AND ALBUTEROL SULFATE 1 DOSE: .5; 3 SOLUTION RESPIRATORY (INHALATION) at 13:14

## 2025-06-27 ASSESSMENT — PAIN - FUNCTIONAL ASSESSMENT: PAIN_FUNCTIONAL_ASSESSMENT: 0-10

## 2025-06-27 ASSESSMENT — PAIN DESCRIPTION - LOCATION: LOCATION: CHEST

## 2025-06-27 ASSESSMENT — PAIN SCALES - GENERAL: PAINLEVEL_OUTOF10: 8

## 2025-06-27 NOTE — ED PROVIDER NOTES
EMERGENCY DEPARTMENT HISTORY AND PHYSICAL EXAM      Date: 6/27/2025  Patient Name: Peyton Tyler    History of Presenting Illness     Chief Complaint   Patient presents with    Chest Pain    Shortness of Breath       History (Context): Peyton Tyler is a 29 y.o. female with significant PMHx for epilepsy, asthma, narcolepsy presents ambulatory to the ED today.  Patient report productive cough and congestion x 4 days. Patient also reports wheezing with hx asthma. Patient reports using inhalers and nebulizer at home without relief of symptoms.  Patient chest pain is exertional and is typically worse at night when she is laying flat.  Patient reports her wheezing is typically worse at night as well.       PCP: eMlony Webster PA-C    No current facility-administered medications for this encounter.     Current Outpatient Medications   Medication Sig Dispense Refill    albuterol sulfate HFA (PROAIR HFA) 108 (90 Base) MCG/ACT inhaler Inhale 2 puffs into the lungs every 6 hours as needed for Wheezing 18 g 0    predniSONE (DELTASONE) 50 MG tablet Take 1 tablet by mouth daily for 5 days 5 tablet 0    benzonatate (TESSALON PERLES) 100 MG capsule Take 1 capsule by mouth 3 times daily as needed for Cough 21 capsule 0    Escitalopram Oxalate (LEXAPRO PO) Take by mouth      Dextromethorphan-guaiFENesin (MUCINEX DM MAXIMUM STRENGTH)  MG TB12 Take 1 tablet by mouth 2 times daily as needed (cough) 28 tablet 0    ondansetron (ZOFRAN-ODT) 4 MG disintegrating tablet Take 1 tablet by mouth 3 times daily as needed for Nausea or Vomiting 21 tablet 0    ibuprofen (ADVIL;MOTRIN) 600 MG tablet Take 1 tablet by mouth 3 times daily as needed for Pain 30 tablet 0    amitriptyline (ELAVIL) 10 MG tablet ceived the following from [facility]: Outside name: amitriptyline (ELAVIL) 10 mg tablet      ipratropium-albuterol (DUONEB) 0.5-2.5 (3) MG/3ML SOLN nebulizer solution Inhale 3 mLs into the lungs every 4 hours as

## 2025-06-27 NOTE — ED NOTES
Discharge instructions reviewed with patient.  Patient verbalized understanding.  Patient advised to follow up as directed on discharge instructions.  Patient denies questions, needs or concerns at this time.  Patient verbalized understanding. No s/sx of distress noted.

## 2025-06-27 NOTE — ED NOTES
Patient states that her breathing is much better. No audible wheezing on exam. Patient feels ready to go home.

## 2025-06-27 NOTE — ED TRIAGE NOTES
Pt to ED for eval of cough, fever, chest pain, SOB x 4 days. Reports 2 family members with same symptoms.

## 2025-07-28 ENCOUNTER — TELEPHONE (OUTPATIENT)
Age: 30
End: 2025-07-28

## 2025-08-20 ENCOUNTER — OFFICE VISIT (OUTPATIENT)
Age: 30
End: 2025-08-20
Payer: COMMERCIAL

## 2025-08-20 DIAGNOSIS — S93.401D SPRAIN OF RIGHT ANKLE, UNSPECIFIED LIGAMENT, SUBSEQUENT ENCOUNTER: ICD-10-CM

## 2025-08-20 DIAGNOSIS — M79.671 RIGHT FOOT PAIN: ICD-10-CM

## 2025-08-20 DIAGNOSIS — S93.491A SPRAIN OF ANTERIOR TALOFIBULAR LIGAMENT OF RIGHT ANKLE, INITIAL ENCOUNTER: Primary | ICD-10-CM

## 2025-08-20 PROCEDURE — 99203 OFFICE O/P NEW LOW 30 MIN: CPT | Performed by: ORTHOPAEDIC SURGERY

## 2025-08-21 PROBLEM — S93.491A SPRAIN OF ANTERIOR TALOFIBULAR LIGAMENT OF RIGHT ANKLE: Status: ACTIVE | Noted: 2025-08-21

## 2025-08-29 ENCOUNTER — TELEPHONE (OUTPATIENT)
Age: 30
End: 2025-08-29

## 2025-08-29 ENCOUNTER — HOSPITAL ENCOUNTER (OUTPATIENT)
Age: 30
Discharge: HOME | End: 2025-09-01
Payer: COMMERCIAL

## 2025-08-29 ENCOUNTER — HOSPITAL ENCOUNTER (OUTPATIENT)
Age: 30
Discharge: HOME | End: 2025-08-29
Payer: COMMERCIAL

## 2025-08-29 DIAGNOSIS — S93.491A SPRAIN OF ANTERIOR TALOFIBULAR LIGAMENT OF RIGHT ANKLE, INITIAL ENCOUNTER: ICD-10-CM

## 2025-08-29 LAB
25(OH)D3 SERPL-MCNC: 9.5 NG/ML (ref 30–100)
ALBUMIN SERPL-MCNC: 3.4 G/DL (ref 3.4–5)
ALBUMIN/GLOB SERPL: 0.9 (ref 0.8–1.7)
ALP SERPL-CCNC: 100 U/L (ref 45–117)
ALT SERPL-CCNC: 13 U/L (ref 10–35)
ANION GAP SERPL CALC-SCNC: 10 MMOL/L (ref 3–18)
AST SERPL-CCNC: 18 U/L (ref 10–38)
BASOPHILS # BLD: 0.04 K/UL (ref 0–0.1)
BASOPHILS NFR BLD: 0.6 % (ref 0–2)
BILIRUB SERPL-MCNC: 0.4 MG/DL (ref 0.2–1)
BUN SERPL-MCNC: 9 MG/DL (ref 6–23)
BUN/CREAT SERPL: 12 (ref 12–20)
CALCIUM SERPL-MCNC: 9.3 MG/DL (ref 8.5–10.1)
CHLORIDE SERPL-SCNC: 104 MMOL/L (ref 98–107)
CO2 SERPL-SCNC: 27 MMOL/L (ref 21–32)
CREAT SERPL-MCNC: 0.74 MG/DL (ref 0.6–1.3)
DIFFERENTIAL METHOD BLD: ABNORMAL
EKG ATRIAL RATE: 64 BPM
EKG DIAGNOSIS: NORMAL
EKG P AXIS: 16 DEGREES
EKG P-R INTERVAL: 152 MS
EKG Q-T INTERVAL: 404 MS
EKG QRS DURATION: 86 MS
EKG QTC CALCULATION (BAZETT): 416 MS
EKG R AXIS: 27 DEGREES
EKG T AXIS: 45 DEGREES
EKG VENTRICULAR RATE: 64 BPM
EOSINOPHIL # BLD: 0.31 K/UL (ref 0–0.4)
EOSINOPHIL NFR BLD: 4.9 % (ref 0–5)
ERYTHROCYTE [DISTWIDTH] IN BLOOD BY AUTOMATED COUNT: 15.2 % (ref 11.6–14.5)
EST. AVERAGE GLUCOSE BLD GHB EST-MCNC: 128 MG/DL
GLOBULIN SER CALC-MCNC: 3.6 G/DL (ref 2–4)
GLUCOSE SERPL-MCNC: 90 MG/DL (ref 74–108)
HBA1C MFR BLD: 6.1 % (ref 4.2–5.6)
HCT VFR BLD AUTO: 39.2 % (ref 35–45)
HGB BLD-MCNC: 12 G/DL (ref 12–16)
IMM GRANULOCYTES # BLD AUTO: 0.01 K/UL (ref 0–0.04)
IMM GRANULOCYTES NFR BLD AUTO: 0.2 % (ref 0–0.5)
INR PPP: 1.1 (ref 0.9–1.2)
LYMPHOCYTES # BLD: 2.68 K/UL (ref 0.9–3.6)
LYMPHOCYTES NFR BLD: 42.7 % (ref 21–52)
MCH RBC QN AUTO: 24.1 PG (ref 24–34)
MCHC RBC AUTO-ENTMCNC: 30.6 G/DL (ref 31–37)
MCV RBC AUTO: 78.7 FL (ref 78–100)
MONOCYTES # BLD: 0.33 K/UL (ref 0.05–1.2)
MONOCYTES NFR BLD: 5.3 % (ref 3–10)
NEUTS SEG # BLD: 2.9 K/UL (ref 1.8–8)
NEUTS SEG NFR BLD: 46.3 % (ref 40–73)
NRBC # BLD: 0 K/UL (ref 0–0.01)
NRBC BLD-RTO: 0 PER 100 WBC
PLATELET # BLD AUTO: 406 K/UL (ref 135–420)
PMV BLD AUTO: 10.4 FL (ref 9.2–11.8)
POTASSIUM SERPL-SCNC: 3.9 MMOL/L (ref 3.5–5.5)
PROT SERPL-MCNC: 7 G/DL (ref 6.4–8.2)
PROTHROMBIN TIME: 14.1 SEC (ref 12–15.1)
RBC # BLD AUTO: 4.98 M/UL (ref 4.2–5.3)
SODIUM SERPL-SCNC: 140 MMOL/L (ref 136–145)
WBC # BLD AUTO: 6.3 K/UL (ref 4.6–13.2)

## 2025-08-29 PROCEDURE — 36415 COLL VENOUS BLD VENIPUNCTURE: CPT

## 2025-08-29 PROCEDURE — 85610 PROTHROMBIN TIME: CPT

## 2025-08-29 PROCEDURE — 80053 COMPREHEN METABOLIC PANEL: CPT

## 2025-08-29 PROCEDURE — 82306 VITAMIN D 25 HYDROXY: CPT

## 2025-08-29 PROCEDURE — 83036 HEMOGLOBIN GLYCOSYLATED A1C: CPT

## 2025-08-29 PROCEDURE — 71046 X-RAY EXAM CHEST 2 VIEWS: CPT

## 2025-08-29 PROCEDURE — 85025 COMPLETE CBC W/AUTO DIFF WBC: CPT

## 2025-09-02 ENCOUNTER — CLINICAL DOCUMENTATION (OUTPATIENT)
Age: 30
End: 2025-09-02

## 2025-09-04 ENCOUNTER — TELEPHONE (OUTPATIENT)
Age: 30
End: 2025-09-04

## 2025-09-05 ENCOUNTER — TELEPHONE (OUTPATIENT)
Age: 30
End: 2025-09-05

## 2025-09-07 RX ORDER — ERGOCALCIFEROL 1.25 MG/1
50000 CAPSULE, LIQUID FILLED ORAL WEEKLY
Qty: 12 CAPSULE | Refills: 1 | Status: SHIPPED | OUTPATIENT
Start: 2025-09-07

## (undated) DEVICE — SOLUTION IRRIG 1000ML H2O STRL BLT

## (undated) DEVICE — SYRINGE MED 25GA 3ML L5/8IN SUBQ PLAS W/ DETACH NDL SFTY

## (undated) DEVICE — CATHETER SUCT TR FL TIP 14FR W/ O CTRL

## (undated) DEVICE — MEDI-VAC SUCTION HIGH CAPACITY: Brand: CARDINAL HEALTH

## (undated) DEVICE — BITE BLOCK ENDOSCP UNIV AD 6 TO 9.4 MM

## (undated) DEVICE — MEDI-VAC NON-CONDUCTIVE SUCTION TUBING: Brand: CARDINAL HEALTH

## (undated) DEVICE — GAUZE,SPONGE,4"X4",16PLY,STRL,LF,10/TRAY: Brand: MEDLINE

## (undated) DEVICE — FLUFF AND POLYMER UNDERPAD,EXTRA HEAVY: Brand: WINGS

## (undated) DEVICE — BASIN EMESIS 500CC ROSE 250/CS 60/PLT: Brand: MEDEGEN MEDICAL PRODUCTS, LLC

## (undated) DEVICE — STERILE POLYISOPRENE POWDER-FREE SURGICAL GLOVES: Brand: PROTEXIS

## (undated) DEVICE — ENDOSCOPY PUMP TUBING/ CAP SET: Brand: ERBE

## (undated) DEVICE — AIRLIFE™ NASAL OXYGEN CANNULA CURVED, FLARED TIP WITH 14 FOOT (4.3 M) CRUSH-RESISTANT TUBING, OVER-THE-EAR STYLE: Brand: AIRLIFE™

## (undated) DEVICE — FLEX ADVANTAGE 3000CC: Brand: FLEX ADVANTAGE

## (undated) DEVICE — SYR 50ML SLIP TIP NSAF LF STRL --